# Patient Record
Sex: FEMALE | Race: WHITE | Employment: FULL TIME | URBAN - METROPOLITAN AREA
[De-identification: names, ages, dates, MRNs, and addresses within clinical notes are randomized per-mention and may not be internally consistent; named-entity substitution may affect disease eponyms.]

---

## 2018-08-29 ENCOUNTER — OFFICE VISIT (OUTPATIENT)
Dept: INTERNAL MEDICINE CLINIC | Age: 58
End: 2018-08-29

## 2018-08-29 VITALS
HEART RATE: 80 BPM | WEIGHT: 164.2 LBS | SYSTOLIC BLOOD PRESSURE: 142 MMHG | OXYGEN SATURATION: 98 % | DIASTOLIC BLOOD PRESSURE: 100 MMHG | BODY MASS INDEX: 26.39 KG/M2 | HEIGHT: 66 IN

## 2018-08-29 DIAGNOSIS — Z12.11 COLON CANCER SCREENING: ICD-10-CM

## 2018-08-29 DIAGNOSIS — Z13.1 SCREENING FOR DIABETES MELLITUS: ICD-10-CM

## 2018-08-29 DIAGNOSIS — Z11.59 NEED FOR HEPATITIS C SCREENING TEST: ICD-10-CM

## 2018-08-29 DIAGNOSIS — Z23 NEED FOR TETANUS, DIPHTHERIA, AND ACELLULAR PERTUSSIS (TDAP) VACCINE: ICD-10-CM

## 2018-08-29 DIAGNOSIS — Z12.39 BREAST CANCER SCREENING: ICD-10-CM

## 2018-08-29 DIAGNOSIS — Z12.2 ENCOUNTER FOR SCREENING FOR LUNG CANCER: ICD-10-CM

## 2018-08-29 DIAGNOSIS — I10 UNCONTROLLED HYPERTENSION: Primary | ICD-10-CM

## 2018-08-29 DIAGNOSIS — H60.8X3 CHRONIC ECZEMATOUS OTITIS EXTERNA OF BOTH EARS: ICD-10-CM

## 2018-08-29 DIAGNOSIS — Z13.6 SCREENING FOR ISCHEMIC HEART DISEASE (IHD): ICD-10-CM

## 2018-08-29 DIAGNOSIS — Z13.220 SCREENING FOR HYPERLIPIDEMIA: ICD-10-CM

## 2018-08-29 DIAGNOSIS — Z13.29 SCREENING FOR THYROID DISORDER: ICD-10-CM

## 2018-08-29 DIAGNOSIS — G89.29 CHRONIC NECK PAIN: ICD-10-CM

## 2018-08-29 DIAGNOSIS — Z71.6 ENCOUNTER FOR SMOKING CESSATION COUNSELING: ICD-10-CM

## 2018-08-29 DIAGNOSIS — R01.1 CARDIAC MURMUR: ICD-10-CM

## 2018-08-29 DIAGNOSIS — M54.2 CHRONIC NECK PAIN: ICD-10-CM

## 2018-08-29 DIAGNOSIS — Z12.4 CERVICAL CANCER SCREENING: ICD-10-CM

## 2018-08-29 DIAGNOSIS — G89.4 CHRONIC PAIN SYNDROME: ICD-10-CM

## 2018-08-29 PROCEDURE — 93000 ELECTROCARDIOGRAM COMPLETE: CPT | Performed by: NURSE PRACTITIONER

## 2018-08-29 PROCEDURE — 4004F PT TOBACCO SCREEN RCVD TLK: CPT | Performed by: NURSE PRACTITIONER

## 2018-08-29 PROCEDURE — 90715 TDAP VACCINE 7 YRS/> IM: CPT | Performed by: NURSE PRACTITIONER

## 2018-08-29 PROCEDURE — G8419 CALC BMI OUT NRM PARAM NOF/U: HCPCS | Performed by: NURSE PRACTITIONER

## 2018-08-29 PROCEDURE — G8427 DOCREV CUR MEDS BY ELIG CLIN: HCPCS | Performed by: NURSE PRACTITIONER

## 2018-08-29 PROCEDURE — 99204 OFFICE O/P NEW MOD 45 MIN: CPT | Performed by: NURSE PRACTITIONER

## 2018-08-29 PROCEDURE — 90471 IMMUNIZATION ADMIN: CPT | Performed by: NURSE PRACTITIONER

## 2018-08-29 PROCEDURE — 3017F COLORECTAL CA SCREEN DOC REV: CPT | Performed by: NURSE PRACTITIONER

## 2018-08-29 RX ORDER — TRIAMCINOLONE ACETONIDE 0.25 MG/G
CREAM TOPICAL
Qty: 15 G | Refills: 0 | Status: SHIPPED | OUTPATIENT
Start: 2018-08-29 | End: 2020-02-27 | Stop reason: CLARIF

## 2018-08-29 RX ORDER — OLMESARTAN MEDOXOMIL 20 MG/1
20 TABLET ORAL DAILY
Qty: 30 TABLET | Refills: 0 | Status: SHIPPED | OUTPATIENT
Start: 2018-08-29 | End: 2020-02-27 | Stop reason: CLARIF

## 2018-08-29 ASSESSMENT — PATIENT HEALTH QUESTIONNAIRE - PHQ9
2. FEELING DOWN, DEPRESSED OR HOPELESS: 0
SUM OF ALL RESPONSES TO PHQ QUESTIONS 1-9: 0
SUM OF ALL RESPONSES TO PHQ9 QUESTIONS 1 & 2: 0
SUM OF ALL RESPONSES TO PHQ QUESTIONS 1-9: 0
1. LITTLE INTEREST OR PLEASURE IN DOING THINGS: 0

## 2018-08-29 ASSESSMENT — ENCOUNTER SYMPTOMS
ABDOMINAL PAIN: 0
BLOOD IN STOOL: 0
CONSTIPATION: 0
DIARRHEA: 0
COUGH: 0
SHORTNESS OF BREATH: 0

## 2018-08-29 NOTE — PATIENT INSTRUCTIONS
diphtheria, and pertussis are very serious diseases. Tdap vaccine can protect us from these diseases. And Tdap vaccine given to pregnant women can protect  babies against pertussis. Tetanus (lockjaw) is rare in the [facility] today. It causes painful muscle tightening and stiffness, usually all over the body. · It can lead to tightening of muscles in the head and neck so you can't open your mouth, swallow, or sometimes even breathe. Tetanus kills about 1 out of 10 people who are infected even after receiving the best medical care. Diphtheria is also rare in the [place] today. It can cause a thick coating to form in the back of the throat. · It can lead to breathing problems, heart failure, paralysis, and death. Pertussis (whooping cough) causes severe coughing spells, which can cause difficulty breathing, vomiting, and disturbed sleep. · It can also lead to weight loss, incontinence, and rib fractures. Up to 2 in 100 adolescents and 5 in 100 adults with pertussis are hospitalized or have complications, which could include pneumonia or death. These diseases are caused by bacteria. Diphtheria and pertussis are spread from person to person through secretions from coughing or sneezing. Tetanus enters the body through cuts, scratches, or wounds. Before vaccines, as many as 200,000 cases of diphtheria, 200,000 cases of pertussis, and hundreds of cases of tetanus were reported in the [place] each year. Since vaccination began, reports of cases for tetanus and diphtheria have dropped by about 99% and for pertussis by about 80%. Tdap vaccine  The Tdap vaccine can protect adolescents and adults from tetanus, diphtheria, and pertussis. One dose of Tdap is routinely given at age 6 or 15. People who did not get Tdap at that age should get it as soon as possible. Tdap is especially important for health care professionals and anyone having close contact with a baby younger than 12 months.   Pregnant women should get a dose of Tdap during every pregnancy, to protect the  from pertussis. Infants are most at risk for severe, life-threatening complications from pertussis. Another vaccine, called Td, protects against tetanus and diphtheria, but not pertussis. A Td booster should be given every 10 years. Tdap may be given as one of these boosters if you have never gotten Tdap before. Tdap may also be given after a severe cut or burn to prevent tetanus infection. Your doctor or the person giving you the vaccine can give you more information. Tdap may safely be given at the same time as other vaccines. Some people should not get this vaccine  · A person who has ever had a life-threatening allergic reaction after a previous dose of any diphtheria-, tetanus-, or pertussis-containing vaccine, OR has a severe allergy to any part of this vaccine, should not get Tdap vaccine. Tell the person giving the vaccine about any severe allergies. · Anyone who had coma or long repeated seizures within 7 days after a childhood dose of DTP or DTaP, or a previous dose of Tdap, should not get Tdap, unless a cause other than the vaccine was found. They can still get Td. · Talk to your doctor if you:  ¨ Have seizures or another nervous system problem. ¨ Had severe pain or swelling after any vaccine containing diphtheria, tetanus, or pertussis. ¨ Ever had a condition called Guillain-Barré Syndrome (GBS). ¨ Aren't feeling well on the day the shot is scheduled. Risks  With any medicine, including vaccines, there is a chance of side effects. These are usually mild and go away on their own. Serious reactions are also possible but are rare. Most people who get Tdap vaccine do not have any problems with it.   Mild problems following Tdap  (Did not interfere with activities)  · Pain where the shot was given (about 3 in 4 adolescents or 2 in 3 adults)  · Redness or swelling where the shot was given (about 1 person in 5)  · Mild Eating healthy foods and being active every day, as well as staying at a healthy weight, may help prevent cancer. Where can you learn more? Go to https://chpepiceweb.[email or web address] org and sign in to your Woopie account. Enter L647 in the [employer] box to learn more about \"Learning About Breast Cancer Screening. \"     If you do not have an account, please click on the \"Sign Up Now\" link. Current as of: May 12, 2017  Content Version: 11.7  © 4287-7567 [employer]. Care instructions adapted under license by [facility] ([facility]). If you have questions about a medical condition or this instruction, always ask your healthcare professional. [address] any warranty or liability for your use of this information. Patient Education        Colon Cancer Screening: Care Instructions  Your Care Instructions    Colorectal cancer occurs in the colon or rectum. That's the lower part of your digestive system. It is the second-leading cause of cancer deaths in the [place]. It often starts with small growths called polyps in the colon or rectum. Polyps are usually found with screening tests. Depending on the type of test, any polyps found may be removed during the tests. Colorectal cancer usually does not cause symptoms at first. But regular tests can help find it early, before it spreads and becomes harder to treat. Experts advise routine tests for colon cancer for people starting at age 48. And they advise people with a higher risk of colon cancer to get tested sooner. Talk with your doctor about when you should start testing. Discuss which tests you need. Follow-up care is a key part of your treatment and safety. Be sure to make and go to all appointments, and call your doctor if you are having problems. It's also a good idea to know your test results and keep a list of the medicines you take. What are the main screening tests for colon cancer? · Stool tests.  These polyposis (FAP). · Have had radiation treatments to the belly or pelvis. When should you call for help? Watch closely for changes in your health, and be sure to contact your doctor if:    · You have any changes in your bowel habits.     · You have any problems. Where can you learn more? Go to https://chpepiceweb.[email or web address] org and sign in to your BAASBOX account. Enter [phone] in the [employer] box to learn more about \"Colon Cancer Screening: Care Instructions. \"     If you do not have an account, please click on the \"Sign Up Now\" link. Current as of: May 12, 2017  Content Version: 11.7  © 0956-0038 [employer]. Care instructions adapted under license by [facility] ([facility]). If you have questions about a medical condition or this instruction, always ask your healthcare professional. [address] any warranty or liability for your use of this information. Patient Education        Home Blood Pressure Test: About This Test  What is it? A home blood pressure test allows you to keep track of your blood pressure at home. Blood pressure is a measure of the force of blood against the walls of your arteries. Blood pressure readings include two numbers, such as 130/80 (say \"130 over 80\"). The first number is the systolic pressure. The second number is the diastolic pressure. Why is this test done? You may do this test at home to:  · Find out if you have high blood pressure. · Track your blood pressure if you have high blood pressure. · Track how well medicine is working to reduce high blood pressure. · Check how lifestyle changes, such as weight loss and exercise, are affecting blood pressure. How can you prepare for the test?  · Do not use caffeine, tobacco, or medicines known to raise blood pressure (such as nasal decongestant sprays) for at least 30 minutes before taking your blood pressure.   · Do not exercise for at least 30 minutes before taking your blood quitting in terms of one day at a time . Tell yourself you won't smoke today, and then don't. Clean your clothes to rid them of the cigarette smell, which can linger a long time. On the Day You Quit   Throw away all your cigarettes and matches. Hide your lighters and ashtrays. Visit the dentist and have your teeth cleaned to get rid of tobacco stains. Notice how nice they look and resolve to keep them that way. Make a list of things you'd like to buy for yourself or someone else. Estimate the cost in terms of packs of cigarettes, and put the money aside to buy these presents. Keep very busy on the big day. Go to the movies, exercise, take long walks, or go bike riding. Remind your family and friends that this is your quit date, and ask them to help you over the rough spots of the first couple of days and weeks. Buy yourself a treat or do something special to celebrate. Telephone and Internet Support   Telephone, web-, and computer-based programs can offer you the support that you need to quit and to stay smoke-free. You can find many programs online, like the American Lung Association's [place] from Smoking . Immediately After Quitting   Develop a clean, fresh, nonsmoking environment around yourselfat work and at home. Buy yourself flowersyou may be surprised how much you can enjoy their scent now. The first few days after you quit, spend as much free time as possible in places where smoking isn't allowed, such as [address] [place] [address], museums, theaters, department stores, and churches. Drink large quantities of water and fruit juice (but avoid sodas that contain caffeine). Try to avoid alcohol, coffee, and other beverages that you associate with cigarette smoking. Strike up conversation instead of a match for a cigarette. If you miss the sensation of having a cigarette in your hand, play with something elsea pencil, a paper clip, a marble.    If you miss having something in your mouth, try

## 2018-08-29 NOTE — PROGRESS NOTES
cancer screening  Baseline is needed    - KALIN DIGITAL SCREEN W OR WO CAD BILATERAL; Future    8. Colon cancer screening  Baseline screening    - COLOGUARD; Future  - [facility] and [address]    9. Need for tetanus, diphtheria, and acellular pertussis (Tdap) vaccine  Given today    - Tdap (age 10y-63y) IM (ADACEL)    8. Need for hepatitis C screening test  Screening requested    11. Cervical cancer screening  Recommend you follow up with me or with GYN for  This soon. 12. Chronic eczematous otitis externa of both ears    - triamcinolone (KENALOG) 0.025 % cream; Apply Topically to external ears and outer ear canals once daily. Dispense: 15 g; Refill: 0    13. Encounter for screening for lung cancer  - CT LUNG SCREENING (ANNUAL); Future    14. Encounter for smoking cessation counseling  Pt advised to try Nicoderm Patch, supplemented with gum and/or lozenges, if needed. Consider Chantix or Wellbutrin.    - CT LUNG SCREENING (ANNUAL);  Future

## 2018-09-20 ENCOUNTER — CLINICAL DOCUMENTATION (OUTPATIENT)
Dept: INTERNAL MEDICINE CLINIC | Age: 58
End: 2018-09-20

## 2018-09-26 ENCOUNTER — TELEPHONE (OUTPATIENT)
Dept: INTERNAL MEDICINE CLINIC | Age: 58
End: 2018-09-26

## 2019-12-17 ENCOUNTER — HOSPITAL ENCOUNTER (OUTPATIENT)
Dept: GENERAL RADIOLOGY | Age: 59
Discharge: HOME | End: 2019-12-17
Payer: COMMERCIAL

## 2019-12-17 ENCOUNTER — HOSPITAL ENCOUNTER (OUTPATIENT)
Age: 59
Discharge: HOME | End: 2019-12-17
Payer: COMMERCIAL

## 2019-12-17 DIAGNOSIS — R52 PAIN: ICD-10-CM

## 2019-12-17 PROCEDURE — 72110 X-RAY EXAM L-2 SPINE 4/>VWS: CPT

## 2020-02-18 ENCOUNTER — HOSPITAL ENCOUNTER (OUTPATIENT)
Dept: NON INVASIVE DIAGNOSTICS | Age: 60
Discharge: HOME | End: 2020-02-18
Payer: COMMERCIAL

## 2020-02-18 LAB
LV EF: 58 %
LVEF MODALITY: NORMAL

## 2020-02-18 PROCEDURE — 93306 TTE W/DOPPLER COMPLETE: CPT

## 2020-02-27 ENCOUNTER — APPOINTMENT (OUTPATIENT)
Dept: GENERAL RADIOLOGY | Age: 60
End: 2020-02-27
Payer: COMMERCIAL

## 2020-02-27 ENCOUNTER — HOSPITAL ENCOUNTER (OUTPATIENT)
Age: 60
Setting detail: OBSERVATION
Discharge: HOME | End: 2020-02-28
Attending: EMERGENCY MEDICINE | Admitting: EMERGENCY MEDICINE
Payer: COMMERCIAL

## 2020-02-27 PROBLEM — R07.9 CHEST PAIN: Status: ACTIVE | Noted: 2020-02-27

## 2020-02-27 LAB
ANION GAP SERPL CALCULATED.3IONS-SCNC: 12 MMOL/L (ref 3–16)
BASOPHILS ABSOLUTE: 0.1 K/UL (ref 0–0.2)
BASOPHILS RELATIVE PERCENT: 1.4 %
BUN BLDV-MCNC: 21 MG/DL (ref 7–20)
CALCIUM SERPL-MCNC: 9.3 MG/DL (ref 8.3–10.6)
CHLORIDE BLD-SCNC: 100 MMOL/L (ref 99–110)
CO2: 26 MMOL/L (ref 21–32)
CREAT SERPL-MCNC: 0.6 MG/DL (ref 0.6–1.2)
EKG ATRIAL RATE: 78 BPM
EKG DIAGNOSIS: NORMAL
EKG P AXIS: 30 DEGREES
EKG P-R INTERVAL: 112 MS
EKG Q-T INTERVAL: 390 MS
EKG QRS DURATION: 86 MS
EKG QTC CALCULATION (BAZETT): 444 MS
EKG R AXIS: 5 DEGREES
EKG T AXIS: 62 DEGREES
EKG VENTRICULAR RATE: 78 BPM
EOSINOPHILS ABSOLUTE: 0.3 K/UL (ref 0–0.6)
EOSINOPHILS RELATIVE PERCENT: 3.5 %
GFR AFRICAN AMERICAN: >60
GFR NON-AFRICAN AMERICAN: >60
GLUCOSE BLD-MCNC: 115 MG/DL (ref 70–99)
HCT VFR BLD CALC: 43.2 % (ref 36–48)
HEMOGLOBIN: 14.3 G/DL (ref 12–16)
LYMPHOCYTES ABSOLUTE: 2.9 K/UL (ref 1–5.1)
LYMPHOCYTES RELATIVE PERCENT: 36.8 %
MCH RBC QN AUTO: 28.2 PG (ref 26–34)
MCHC RBC AUTO-ENTMCNC: 33.1 G/DL (ref 31–36)
MCV RBC AUTO: 85.2 FL (ref 80–100)
MONOCYTES ABSOLUTE: 0.6 K/UL (ref 0–1.3)
MONOCYTES RELATIVE PERCENT: 8.1 %
NEUTROPHILS ABSOLUTE: 4 K/UL (ref 1.7–7.7)
NEUTROPHILS RELATIVE PERCENT: 50.2 %
PDW BLD-RTO: 13.9 % (ref 12.4–15.4)
PLATELET # BLD: 283 K/UL (ref 135–450)
PMV BLD AUTO: 8.2 FL (ref 5–10.5)
POTASSIUM REFLEX MAGNESIUM: 4.8 MMOL/L (ref 3.5–5.1)
RBC # BLD: 5.07 M/UL (ref 4–5.2)
SODIUM BLD-SCNC: 138 MMOL/L (ref 136–145)
TROPONIN: <0.01 NG/ML
WBC # BLD: 7.9 K/UL (ref 4–11)

## 2020-02-27 PROCEDURE — 80048 BASIC METABOLIC PNL TOTAL CA: CPT

## 2020-02-27 PROCEDURE — 85025 COMPLETE CBC W/AUTO DIFF WBC: CPT

## 2020-02-27 PROCEDURE — 2580000003 HC RX 258: Performed by: INTERNAL MEDICINE

## 2020-02-27 PROCEDURE — G0378 HOSPITAL OBSERVATION PER HR: HCPCS

## 2020-02-27 PROCEDURE — 6360000002 HC RX W HCPCS: Performed by: INTERNAL MEDICINE

## 2020-02-27 PROCEDURE — 84484 ASSAY OF TROPONIN QUANT: CPT

## 2020-02-27 PROCEDURE — 6370000000 HC RX 637 (ALT 250 FOR IP): Performed by: EMERGENCY MEDICINE

## 2020-02-27 PROCEDURE — 93005 ELECTROCARDIOGRAM TRACING: CPT | Performed by: EMERGENCY MEDICINE

## 2020-02-27 PROCEDURE — 36415 COLL VENOUS BLD VENIPUNCTURE: CPT

## 2020-02-27 PROCEDURE — 71046 X-RAY EXAM CHEST 2 VIEWS: CPT

## 2020-02-27 PROCEDURE — 99285 EMERGENCY DEPT VISIT HI MDM: CPT

## 2020-02-27 PROCEDURE — 96372 THER/PROPH/DIAG INJ SC/IM: CPT

## 2020-02-27 RX ORDER — AMLODIPINE BESYLATE 5 MG/1
5 TABLET ORAL DAILY
Status: DISCONTINUED | OUTPATIENT
Start: 2020-02-28 | End: 2020-02-28 | Stop reason: HOSPADM

## 2020-02-27 RX ORDER — SODIUM CHLORIDE 0.9 % (FLUSH) 0.9 %
10 SYRINGE (ML) INJECTION PRN
Status: DISCONTINUED | OUTPATIENT
Start: 2020-02-27 | End: 2020-02-28 | Stop reason: HOSPADM

## 2020-02-27 RX ORDER — TRAMADOL HYDROCHLORIDE 50 MG/1
50 TABLET ORAL 2 TIMES DAILY PRN
Status: ON HOLD | COMMUNITY
End: 2020-09-26 | Stop reason: HOSPADM

## 2020-02-27 RX ORDER — ACETAMINOPHEN 325 MG/1
650 TABLET ORAL EVERY 6 HOURS PRN
Status: DISCONTINUED | OUTPATIENT
Start: 2020-02-27 | End: 2020-02-28 | Stop reason: HOSPADM

## 2020-02-27 RX ORDER — NAPROXEN SODIUM 220 MG
220 TABLET ORAL 2 TIMES DAILY PRN
Status: ON HOLD | COMMUNITY
End: 2020-02-28 | Stop reason: HOSPADM

## 2020-02-27 RX ORDER — ACETAMINOPHEN 650 MG/1
650 SUPPOSITORY RECTAL EVERY 6 HOURS PRN
Status: DISCONTINUED | OUTPATIENT
Start: 2020-02-27 | End: 2020-02-28 | Stop reason: HOSPADM

## 2020-02-27 RX ORDER — AMLODIPINE BESYLATE 5 MG/1
5 TABLET ORAL DAILY
Status: ON HOLD | COMMUNITY
Start: 2020-02-25 | End: 2020-09-26 | Stop reason: HOSPADM

## 2020-02-27 RX ORDER — TRAMADOL HYDROCHLORIDE 50 MG/1
50 TABLET ORAL 2 TIMES DAILY
Status: DISCONTINUED | OUTPATIENT
Start: 2020-02-27 | End: 2020-02-28 | Stop reason: HOSPADM

## 2020-02-27 RX ORDER — ASPIRIN 325 MG
325 TABLET ORAL ONCE
Status: COMPLETED | OUTPATIENT
Start: 2020-02-27 | End: 2020-02-27

## 2020-02-27 RX ORDER — SODIUM CHLORIDE 0.9 % (FLUSH) 0.9 %
10 SYRINGE (ML) INJECTION EVERY 12 HOURS SCHEDULED
Status: DISCONTINUED | OUTPATIENT
Start: 2020-02-27 | End: 2020-02-28 | Stop reason: HOSPADM

## 2020-02-27 RX ORDER — M-VIT,TX,IRON,MINS/CALC/FOLIC 27MG-0.4MG
1 TABLET ORAL DAILY
Status: ON HOLD | COMMUNITY
End: 2020-09-26 | Stop reason: HOSPADM

## 2020-02-27 RX ORDER — DOXYCYCLINE HYCLATE 100 MG/1
100 CAPSULE ORAL 2 TIMES DAILY
Status: ON HOLD | COMMUNITY
Start: 2020-02-25 | End: 2020-02-28 | Stop reason: HOSPADM

## 2020-02-27 RX ORDER — POLYETHYLENE GLYCOL 3350 17 G/17G
17 POWDER, FOR SOLUTION ORAL DAILY PRN
Status: DISCONTINUED | OUTPATIENT
Start: 2020-02-27 | End: 2020-02-28 | Stop reason: HOSPADM

## 2020-02-27 RX ORDER — OXYMETAZOLINE HYDROCHLORIDE 0.05 G/100ML
2 SPRAY NASAL 2 TIMES DAILY PRN
Status: ON HOLD | COMMUNITY
End: 2020-09-26 | Stop reason: HOSPADM

## 2020-02-27 RX ORDER — ONDANSETRON 2 MG/ML
4 INJECTION INTRAMUSCULAR; INTRAVENOUS EVERY 6 HOURS PRN
Status: DISCONTINUED | OUTPATIENT
Start: 2020-02-27 | End: 2020-02-28 | Stop reason: HOSPADM

## 2020-02-27 RX ORDER — ASPIRIN 81 MG/1
81 TABLET, CHEWABLE ORAL DAILY
Status: DISCONTINUED | OUTPATIENT
Start: 2020-02-28 | End: 2020-02-28 | Stop reason: HOSPADM

## 2020-02-27 RX ADMIN — ENOXAPARIN SODIUM 40 MG: 40 INJECTION SUBCUTANEOUS at 11:59

## 2020-02-27 RX ADMIN — Medication 10 ML: at 21:04

## 2020-02-27 RX ADMIN — ASPIRIN 325 MG ORAL TABLET 325 MG: 325 PILL ORAL at 08:50

## 2020-02-27 RX ADMIN — Medication 10 ML: at 11:58

## 2020-02-27 ASSESSMENT — PAIN SCALES - GENERAL: PAINLEVEL_OUTOF10: 0

## 2020-02-27 ASSESSMENT — HEART SCORE
ECG: 0
ECG: 0

## 2020-02-27 NOTE — ED PROVIDER NOTES
[address] ENCOUNTER          ATTENDING PHYSICIAN NOTE       Date of evaluation: 2/27/2020    Chief Complaint     Chest Pain (Pt presents to ED with \"on and off chest pain\" that started 0430 is am. Pt states pain is above left breast that radiates to left shoulder. 8/10 sharp in nature, pt denies SOB with pain, pt + nausea. Pt currently denies pain a present time. ) and Nausea      History of Present Illness     Kathryn Campos is a 60 y.o. female who presents with chest pain. Woke up from sleep with left-sided chest pain at about 430 this morning. Describes it as sharp, radiating to left shoulder, lasted a minute or 2, associated with mild diaphoresis and nausea, not pleuritic. No aggravating or alleviating factors. States that she has had 5 or 6 episodes of this pain since then. Never exertional.  Has never had anything like this before. Does have high blood pressure. Review of Systems     Positive for chest pain. Negative for vomiting, shortness of breath, abdominal pain. All other systems were reviewed and are negative, except as mentioned in HPI. Past Medical, Surgical, Family, and Social History     She has a past medical history of Arthritis and Hypertension. She has a past surgical history that includes knee surgery (2009) and Carpal tunnel release (2011). Her family history includes Heart Attack in her father; Heart Disease in her father and mother; Kidney Disease in her mother. She reports that she has been smoking. She has a 10.00 pack-year smoking history. She has never used smokeless tobacco. She reports that she does not drink alcohol or use drugs.     Medications     Previous Medications    AMLODIPINE (NORVASC) 5 MG TABLET        DOXYCYCLINE HYCLATE (VIBRAMYCIN) 100 MG CAPSULE        MULTIPLE VITAMINS-MINERALS (MULTIVITAL PO)    Take by mouth    NAPROXEN SODIUM (ALEVE PO)    Take by mouth    OLMESARTAN (BENICAR) 20 MG TABLET    Take 1 tablet by mouth daily    TRAMADOL (ULTRAM) 50 MG TABLET    Take 50 mg by mouth 2 times daily    TRIAMCINOLONE (KENALOG) 0.025 % CREAM    Apply Topically to external ears and outer ear canals once daily. Allergies     She is allergic to celebrex [celecoxib]; lisinopril; and macrodantin [nitrofurantoin macrocrystal]. Physical Exam     INITIAL VITALS: BP: (!) 181/94, Temp: 98.6 °F (37 °C), Pulse: 72, Resp: 12, SpO2: 100 %       General:  Well appearing. No acute distress. Eyes:  Pupils reactive. No discharge from eyes. ENT:  No discharge from nose. Neck:  Supple. Pulmonary:   Non-labored breathing. Breath sounds clear bilaterally. Cardiac:  Regular rate and rhythm. Abdomen:  Soft. Non-tender. Non-distended. Skin:  No rash. Neuro:  Alert and oriented x4. Moves all four extremities to command. No focal deficit. Extremities:  Warm and perfused. No peripheral edema. Diagnostic Results     LABS:   Please see electronic medical record for laboratory tests performed in the ED. RADIOLOGY:  Please see electronic medical record for imaging studies performed in the ED. EKG   Please see medical record for specific interval measurements. Impression: Normal sinus rhythm at 78 bpm, normal intervals, normal axis, no acute ST or T wave changes compared to EKG from August 29, 2018        RECENT VITALS:  BP: (!) 181/94, Temp: 98.6 °F (37 °C), Pulse: 72, Resp: 12     Procedures         ED Course     Nursing Notes, Past Medical Hx, Past Surgical Hx, Social Hx, Allergies, and Family Hx were reviewed. The patient was given the following medications:  Orders Placed This Encounter   Medications    aspirin tablet 325 mg       CONSULTS:  Lizbet Darden / EDGARD / Kely Rayna is a 60 y.o. female with intermittent chest pain. Currently chest pain-free. She was given an aspirin. EKG unremarkable and unchanged from previous.   Chest x-ray unremarkable. CBC with white count of 7.9 and hemoglobin 14.3. Renal panel with normal electrolytes and renal function. Troponin negative. The patient has a heart score 4. Ideally we would get a stress test on her. She has had 2 cups of coffee today and therefore cannot be stressed except for standard treadmill test.  The patient has significant arthritis in her extremities and therefore would likely fail treadmill test and it would be better to get advanced imaging along with her stress test.  The patient does not currently have a primary care physician. Therefore I will admit her for further management. Clinical Impression     1. Chest pain, unspecified type        Disposition     PATIENT REFERRED TO:  No follow-up provider specified.     DISCHARGE MEDICATIONS:  New Prescriptions    No medications on file       DISPOSITION Decision To Admit 02/27/2020 09:39:03 AM           Jose Armando Emanuel MD  02/27/20 0021

## 2020-02-27 NOTE — H&P
(ALEVE) 220 MG tablet Take 220 mg by mouth 2 times daily as needed (arthritis pain)   Yes Historical Provider, MD   oxymetazoline (AFRIN) 0.05 % nasal spray 2 sprays by Nasal route 2 times daily as needed for Congestion   Yes Historical Provider, MD       Allergies:  Celebrex [celecoxib]; Lisinopril; and Macrodantin [nitrofurantoin macrocrystal]    Social History:      The patient currentlylives at home     TOBACCO:   reports that she has been smoking. She has a 10.00 pack-year smoking history. She has never used smokeless tobacco.  ETOH:   reports no history of alcohol use. Family History:       Reviewed in detail and negative for DM, CAD,Cancer, CVA. Positive as follows:        Problem Relation Age of Onset    Heart Disease Mother     Kidney Disease Mother     Heart Disease Father     Heart Attack Father        REVIEW OF SYSTEMS:   Pertinent positives as noted in the HPI. All other systems reviewed and negative. PHYSICAL EXAM PERFORMED:    BP (!) 153/85   Pulse 74   Temp 98.6 °F (37 °C) (Oral)   Resp 12   Ht 5' 2\" (1.575 m)   Wt 162 lb (73.5 kg)   SpO2 100%   BMI 29.63 kg/m²     Physical Exam  Constitutional:       Appearance: Normal appearance. HENT:      Head: Normocephalic and atraumatic. Mouth/Throat:      Mouth: Mucous membranes are moist.      Pharynx: Oropharynx is clear. Neck:      Musculoskeletal: Normal range of motion and neck supple. Cardiovascular:      Rate and Rhythm: Normal rate and regular rhythm. Pulses: Normal pulses. Heart sounds: Normal heart sounds. Pulmonary:      Effort: Pulmonary effort is normal.      Breath sounds: Normal breath sounds. Abdominal:      General: Abdomen is flat. Palpations: Abdomen is soft. Musculoskeletal: Normal range of motion. General: No swelling or tenderness. Skin:     General: Skin is warm and dry. Capillary Refill: Capillary refill takes less than 2 seconds.    Neurological:      General: No focal deficit present. Mental Status: She is alert and oriented to person, place, and time. Psychiatric:         Mood and Affect: Mood normal.         Behavior: Behavior normal.         Labs:     Recent Labs     02/27/20  0845   WBC 7.9   HGB 14.3   HCT 43.2        Recent Labs     02/27/20  0845      K 4.8      CO2 26   BUN 21*   CREATININE 0.6   CALCIUM 9.3     No results for input(s): AST, ALT, BILIDIR, BILITOT, ALKPHOS in the last 72 hours. No results for input(s): INR in the last 72 hours. Recent Labs     02/27/20  0845 02/27/20  1144   TROPONINI <0.01 <0.01       Urinalysis:   Lab Results   Component Value Date    NITRU Negative 08/04/2015    WBCUA 3-5 08/04/2015    BACTERIA Rare 08/04/2015    RBCUA 3-5 08/04/2015    BLOODU SMALL 08/04/2015    SPECGRAV 1.010 08/04/2015    GLUCOSEU Negative 08/04/2015       Radiology:         XR CHEST STANDARD (2 VW)   Final Result      Clear lungs. Normal cardiomediastinal silhouette. NM Cardiac Stress Test Nuclear Imaging    (Results Pending)       ASSESSMENT & PLAN  Active Problems:    Chest pain  Resolved Problems:    * No resolved hospital problems. *    Chest pain  Typical with the left shoulder radiation and diaphoresis. Risk factors include hypertension, smoker, positive family history. We will trend troponin. Nuclear stress test tomorrow. Hypertension  Continue home medications and monitor. Check lipid function panel      Code Status: Full Toro Guaman MD    Thank you No primary care provider on file. for the opportunity to be involved in this patient's care. If youhave any questions or concerns please feel free to contact me at [phone].

## 2020-02-27 NOTE — ED TRIAGE NOTES
Pt presents to ED with \"on and off chest pain\" that started 0430 is am. Pt states pain is above left breast that radiates to left shoulder. 8/10 sharp in nature, pt denies SOB with pain, pt + nausea. Pt currently denies pain a present time.

## 2020-02-27 NOTE — ED NOTES
Home Med List is complete. Source of medications in list is patient interview, as well as confirming with Kelsi Torres fills. Patient states that she did have her morning meds today.       Dana Menjivar PharmD., BCPS   2/27/2020 11:19 AM  Wireless: [phone]

## 2020-02-27 NOTE — ED NOTES
.Rounded on patient for pain, repositioning, and toileting needs. Call light and personal items within reach. Side rails up times 2.      Caroline Ruiz, BIN  02/27/20 [id number]

## 2020-02-28 VITALS
SYSTOLIC BLOOD PRESSURE: 179 MMHG | WEIGHT: 159.83 LBS | BODY MASS INDEX: 29.41 KG/M2 | RESPIRATION RATE: 14 BRPM | DIASTOLIC BLOOD PRESSURE: 91 MMHG | HEIGHT: 62 IN | OXYGEN SATURATION: 99 % | HEART RATE: 74 BPM | TEMPERATURE: 98.9 F

## 2020-02-28 LAB
HCT VFR BLD CALC: 45 % (ref 36–48)
HEMOGLOBIN: 14.5 G/DL (ref 12–16)
LV EF: 73 %
LVEF MODALITY: NORMAL
MCH RBC QN AUTO: 27.8 PG (ref 26–34)
MCHC RBC AUTO-ENTMCNC: 32.3 G/DL (ref 31–36)
MCV RBC AUTO: 86 FL (ref 80–100)
PDW BLD-RTO: 14 % (ref 12.4–15.4)
PLATELET # BLD: 311 K/UL (ref 135–450)
PMV BLD AUTO: 8.1 FL (ref 5–10.5)
RBC # BLD: 5.23 M/UL (ref 4–5.2)
WBC # BLD: 6.1 K/UL (ref 4–11)

## 2020-02-28 PROCEDURE — A9502 TC99M TETROFOSMIN: HCPCS | Performed by: INTERNAL MEDICINE

## 2020-02-28 PROCEDURE — 3430000000 HC RX DIAGNOSTIC RADIOPHARMACEUTICAL: Performed by: INTERNAL MEDICINE

## 2020-02-28 PROCEDURE — 6360000002 HC RX W HCPCS: Performed by: INTERNAL MEDICINE

## 2020-02-28 PROCEDURE — G0378 HOSPITAL OBSERVATION PER HR: HCPCS

## 2020-02-28 PROCEDURE — 2580000003 HC RX 258: Performed by: INTERNAL MEDICINE

## 2020-02-28 PROCEDURE — 85027 COMPLETE CBC AUTOMATED: CPT

## 2020-02-28 PROCEDURE — 78452 HT MUSCLE IMAGE SPECT MULT: CPT

## 2020-02-28 PROCEDURE — 6370000000 HC RX 637 (ALT 250 FOR IP): Performed by: INTERNAL MEDICINE

## 2020-02-28 PROCEDURE — 93017 CV STRESS TEST TRACING ONLY: CPT

## 2020-02-28 PROCEDURE — 96372 THER/PROPH/DIAG INJ SC/IM: CPT

## 2020-02-28 PROCEDURE — 36415 COLL VENOUS BLD VENIPUNCTURE: CPT

## 2020-02-28 RX ORDER — SODIUM CHLORIDE 0.9 % (FLUSH) 0.9 %
10 SYRINGE (ML) INJECTION PRN
Status: COMPLETED | OUTPATIENT
Start: 2020-02-28 | End: 2020-02-28

## 2020-02-28 RX ORDER — LOSARTAN POTASSIUM 25 MG/1
25 TABLET ORAL DAILY
Qty: 30 TABLET | Refills: 0 | Status: SHIPPED | OUTPATIENT
Start: 2020-02-28 | End: 2020-09-16

## 2020-02-28 RX ADMIN — TETROFOSMIN 11 MILLICURIE: 1.38 INJECTION, POWDER, LYOPHILIZED, FOR SOLUTION INTRAVENOUS at 08:03

## 2020-02-28 RX ADMIN — AMLODIPINE BESYLATE 5 MG: 5 TABLET ORAL at 11:03

## 2020-02-28 RX ADMIN — ASPIRIN 81 MG 81 MG: 81 TABLET ORAL at 11:03

## 2020-02-28 RX ADMIN — ENOXAPARIN SODIUM 40 MG: 40 INJECTION SUBCUTANEOUS at 11:04

## 2020-02-28 RX ADMIN — REGADENOSON 0.4 MG: 0.08 INJECTION, SOLUTION INTRAVENOUS at 10:42

## 2020-02-28 RX ADMIN — Medication 10 ML: at 08:03

## 2020-02-28 RX ADMIN — Medication 10 ML: at 11:04

## 2020-02-28 RX ADMIN — Medication 10 ML: at 09:50

## 2020-02-28 RX ADMIN — TETROFOSMIN 34.4 MILLICURIE: 1.38 INJECTION, POWDER, LYOPHILIZED, FOR SOLUTION INTRAVENOUS at 09:50

## 2020-02-28 ASSESSMENT — PAIN SCALES - GENERAL
PAINLEVEL_OUTOF10: 0

## 2020-02-28 NOTE — PROGRESS NOTES
Patient discharging home with . IV and telemetry removed. Discharge instructions and medications reviewed, patient verbalizes understanding and states she has no further questions at this time. Discharging with personal belongings and AVS packet. Declined wheelchair to leave the unit.

## 2020-02-28 NOTE — CARE COORDINATION
worker;  or  CANNOT provide pharmacy voucher for patients co-pays  5. Patients can then  the prescription on their way out of the hospital at discharge, or pharmacy can deliver to the bedside if staff is available. (payment due at time of pick-up or delivery - cash, check, or card accepted)     Able to afford home medications/ co-pay costs: Yes    ADLS  Support Systems: Spouse/Significant Other    PT AM-PAC:   /24  OT AM-PAC:   /24    [place]: home w/partner  Steps:      Plans to RETURN to current housing: No  Barriers to RETURNING to current housing: none    Donal [address] 77  Currently ACTIVE with [facility] [address]: No  Home Care Agency: Not Applicable    Currently ACTIVE with [place] on Aging: No  Passport/ Waiver: No  Passport/ Waiver Services: Not Applicable        Durable Medical Equipment  DME Provider: rusty  Equipment: na    Home Oxygen and [address] [place] prior to admission: No  [address]: Not Applicable  Other Respiratory Equipment: na      Dialysis  Active with HD/PD prior to admission: No  Nephrologist:      HD Center:  Not Applicable    DISCHARGE PLAN:  Disposition: Home- No Services Needed    Transportation PLAN for discharge: partner     Factors facilitating achievement of predicted outcomes: Caregiver support    Barriers to discharge: na    Additional Case Management Notes: CM met with patient. Waiting for stress test results. Does not anticipate any needs. The Plan for Transition of Care is related to the following treatment goals of Chest pain [R07.9]  Chest pain [R07.9]    The Patient and/or patient representative Nilam and her family were provided with a choice of provider and agrees with the discharge plan Not Indicated    Freedom of choice list was provided with basic dialogue that supports the patient's individualized plan of care/goals and shares the quality data associated with the providers.  Not

## 2020-09-16 ENCOUNTER — APPOINTMENT (OUTPATIENT)
Dept: GENERAL RADIOLOGY | Age: 60
DRG: 234 | End: 2020-09-16
Payer: COMMERCIAL

## 2020-09-16 ENCOUNTER — HOSPITAL ENCOUNTER (INPATIENT)
Age: 60
LOS: 10 days | Discharge: HOME HEALTH | DRG: 234 | End: 2020-09-26
Attending: EMERGENCY MEDICINE | Admitting: INTERNAL MEDICINE
Payer: COMMERCIAL

## 2020-09-16 PROBLEM — I21.4 NSTEMI (NON-ST ELEVATED MYOCARDIAL INFARCTION) (HCC): Status: ACTIVE | Noted: 2020-09-16

## 2020-09-16 LAB
ANION GAP SERPL CALCULATED.3IONS-SCNC: 11 MMOL/L (ref 3–16)
ANION GAP SERPL CALCULATED.3IONS-SCNC: 8 MMOL/L (ref 3–16)
ANTI-XA UNFRAC HEPARIN: 0.29 IU/ML (ref 0.3–0.7)
ANTI-XA UNFRAC HEPARIN: 0.44 IU/ML (ref 0.3–0.7)
ANTI-XA UNFRAC HEPARIN: 0.48 IU/ML (ref 0.3–0.7)
APTT: 28.1 SEC (ref 24.2–36.2)
BASOPHILS ABSOLUTE: 0.1 K/UL (ref 0–0.2)
BASOPHILS RELATIVE PERCENT: 1.3 %
BUN BLDV-MCNC: 20 MG/DL (ref 7–20)
BUN BLDV-MCNC: 21 MG/DL (ref 7–20)
CALCIUM SERPL-MCNC: 9.5 MG/DL (ref 8.3–10.6)
CALCIUM SERPL-MCNC: 9.7 MG/DL (ref 8.3–10.6)
CHLORIDE BLD-SCNC: 100 MMOL/L (ref 99–110)
CHLORIDE BLD-SCNC: 97 MMOL/L (ref 99–110)
CO2: 25 MMOL/L (ref 21–32)
CO2: 28 MMOL/L (ref 21–32)
CREAT SERPL-MCNC: 0.6 MG/DL (ref 0.6–1.2)
CREAT SERPL-MCNC: 0.8 MG/DL (ref 0.6–1.2)
EKG ATRIAL RATE: 70 BPM
EKG ATRIAL RATE: 72 BPM
EKG ATRIAL RATE: 75 BPM
EKG DIAGNOSIS: NORMAL
EKG P AXIS: 17 DEGREES
EKG P AXIS: 22 DEGREES
EKG P AXIS: 72 DEGREES
EKG P-R INTERVAL: 122 MS
EKG P-R INTERVAL: 122 MS
EKG P-R INTERVAL: 150 MS
EKG Q-T INTERVAL: 408 MS
EKG Q-T INTERVAL: 420 MS
EKG Q-T INTERVAL: 420 MS
EKG QRS DURATION: 76 MS
EKG QRS DURATION: 78 MS
EKG QRS DURATION: 80 MS
EKG QTC CALCULATION (BAZETT): 453 MS
EKG QTC CALCULATION (BAZETT): 455 MS
EKG QTC CALCULATION (BAZETT): 459 MS
EKG R AXIS: -11 DEGREES
EKG R AXIS: -14 DEGREES
EKG R AXIS: -4 DEGREES
EKG T AXIS: 27 DEGREES
EKG T AXIS: 41 DEGREES
EKG T AXIS: 57 DEGREES
EKG VENTRICULAR RATE: 70 BPM
EKG VENTRICULAR RATE: 72 BPM
EKG VENTRICULAR RATE: 75 BPM
EOSINOPHILS ABSOLUTE: 0.2 K/UL (ref 0–0.6)
EOSINOPHILS RELATIVE PERCENT: 2.4 %
ESTIMATED AVERAGE GLUCOSE: 131.2 MG/DL
GFR AFRICAN AMERICAN: >60
GFR AFRICAN AMERICAN: >60
GFR NON-AFRICAN AMERICAN: >60
GFR NON-AFRICAN AMERICAN: >60
GLUCOSE BLD-MCNC: 112 MG/DL (ref 70–99)
GLUCOSE BLD-MCNC: 118 MG/DL (ref 70–99)
GLUCOSE BLD-MCNC: 147 MG/DL (ref 70–99)
GLUCOSE BLD-MCNC: 151 MG/DL (ref 70–99)
GLUCOSE BLD-MCNC: 250 MG/DL (ref 70–99)
HBA1C MFR BLD: 6.2 %
HCT VFR BLD CALC: 42.7 % (ref 36–48)
HEMOGLOBIN: 14.4 G/DL (ref 12–16)
INR BLD: 1.03 (ref 0.86–1.14)
LYMPHOCYTES ABSOLUTE: 2.6 K/UL (ref 1–5.1)
LYMPHOCYTES RELATIVE PERCENT: 27.1 %
MAGNESIUM: 2 MG/DL (ref 1.8–2.4)
MCH RBC QN AUTO: 28 PG (ref 26–34)
MCHC RBC AUTO-ENTMCNC: 33.6 G/DL (ref 31–36)
MCV RBC AUTO: 83.4 FL (ref 80–100)
MONOCYTES ABSOLUTE: 0.7 K/UL (ref 0–1.3)
MONOCYTES RELATIVE PERCENT: 7.2 %
NEUTROPHILS ABSOLUTE: 6 K/UL (ref 1.7–7.7)
NEUTROPHILS RELATIVE PERCENT: 62 %
PDW BLD-RTO: 13.5 % (ref 12.4–15.4)
PERFORMED ON: ABNORMAL
PLATELET # BLD: 295 K/UL (ref 135–450)
PMV BLD AUTO: 8.2 FL (ref 5–10.5)
POTASSIUM REFLEX MAGNESIUM: 3.8 MMOL/L (ref 3.5–5.1)
POTASSIUM SERPL-SCNC: 4.4 MMOL/L (ref 3.5–5.1)
PROTHROMBIN TIME: 12 SEC (ref 10–13.2)
RBC # BLD: 5.12 M/UL (ref 4–5.2)
SARS-COV-2, NAAT: NOT DETECTED
SODIUM BLD-SCNC: 133 MMOL/L (ref 136–145)
SODIUM BLD-SCNC: 136 MMOL/L (ref 136–145)
TROPONIN: 0.16 NG/ML
TROPONIN: 0.73 NG/ML
TROPONIN: 1.99 NG/ML
TROPONIN: 2.03 NG/ML
TROPONIN: 2.19 NG/ML
TROPONIN: <0.01 NG/ML
WBC # BLD: 9.7 K/UL (ref 4–11)

## 2020-09-16 PROCEDURE — 85610 PROTHROMBIN TIME: CPT

## 2020-09-16 PROCEDURE — 85730 THROMBOPLASTIN TIME PARTIAL: CPT

## 2020-09-16 PROCEDURE — 96374 THER/PROPH/DIAG INJ IV PUSH: CPT

## 2020-09-16 PROCEDURE — 83036 HEMOGLOBIN GLYCOSYLATED A1C: CPT

## 2020-09-16 PROCEDURE — 93005 ELECTROCARDIOGRAM TRACING: CPT | Performed by: STUDENT IN AN ORGANIZED HEALTH CARE EDUCATION/TRAINING PROGRAM

## 2020-09-16 PROCEDURE — 6370000000 HC RX 637 (ALT 250 FOR IP): Performed by: STUDENT IN AN ORGANIZED HEALTH CARE EDUCATION/TRAINING PROGRAM

## 2020-09-16 PROCEDURE — 6370000000 HC RX 637 (ALT 250 FOR IP): Performed by: EMERGENCY MEDICINE

## 2020-09-16 PROCEDURE — 80048 BASIC METABOLIC PNL TOTAL CA: CPT

## 2020-09-16 PROCEDURE — 93005 ELECTROCARDIOGRAM TRACING: CPT | Performed by: EMERGENCY MEDICINE

## 2020-09-16 PROCEDURE — 2060000000 HC ICU INTERMEDIATE R&B

## 2020-09-16 PROCEDURE — 85520 HEPARIN ASSAY: CPT

## 2020-09-16 PROCEDURE — 6370000000 HC RX 637 (ALT 250 FOR IP): Performed by: INTERNAL MEDICINE

## 2020-09-16 PROCEDURE — 85025 COMPLETE CBC W/AUTO DIFF WBC: CPT

## 2020-09-16 PROCEDURE — 84484 ASSAY OF TROPONIN QUANT: CPT

## 2020-09-16 PROCEDURE — 83735 ASSAY OF MAGNESIUM: CPT

## 2020-09-16 PROCEDURE — 6360000002 HC RX W HCPCS: Performed by: STUDENT IN AN ORGANIZED HEALTH CARE EDUCATION/TRAINING PROGRAM

## 2020-09-16 PROCEDURE — 36415 COLL VENOUS BLD VENIPUNCTURE: CPT

## 2020-09-16 PROCEDURE — U0003 INFECTIOUS AGENT DETECTION BY NUCLEIC ACID (DNA OR RNA); SEVERE ACUTE RESPIRATORY SYNDROME CORONAVIRUS 2 (SARS-COV-2) (CORONAVIRUS DISEASE [COVID-19]), AMPLIFIED PROBE TECHNIQUE, MAKING USE OF HIGH THROUGHPUT TECHNOLOGIES AS DESCRIBED BY CMS-2020-01-R: HCPCS

## 2020-09-16 PROCEDURE — 93010 ELECTROCARDIOGRAM REPORT: CPT | Performed by: INTERNAL MEDICINE

## 2020-09-16 PROCEDURE — 71045 X-RAY EXAM CHEST 1 VIEW: CPT

## 2020-09-16 PROCEDURE — 6360000002 HC RX W HCPCS: Performed by: EMERGENCY MEDICINE

## 2020-09-16 PROCEDURE — 99254 IP/OBS CNSLTJ NEW/EST MOD 60: CPT | Performed by: INTERNAL MEDICINE

## 2020-09-16 PROCEDURE — U0002 COVID-19 LAB TEST NON-CDC: HCPCS

## 2020-09-16 PROCEDURE — 99285 EMERGENCY DEPT VISIT HI MDM: CPT

## 2020-09-16 RX ORDER — HEPARIN SODIUM 5000 [USP'U]/ML
2000 INJECTION, SOLUTION INTRAVENOUS; SUBCUTANEOUS PRN
Status: DISCONTINUED | OUTPATIENT
Start: 2020-09-16 | End: 2020-09-22

## 2020-09-16 RX ORDER — INSULIN LISPRO 100 [IU]/ML
0-6 INJECTION, SOLUTION INTRAVENOUS; SUBCUTANEOUS
Status: DISCONTINUED | OUTPATIENT
Start: 2020-09-16 | End: 2020-09-22

## 2020-09-16 RX ORDER — HEPARIN SODIUM 10000 [USP'U]/100ML
12 INJECTION, SOLUTION INTRAVENOUS CONTINUOUS
Status: DISCONTINUED | OUTPATIENT
Start: 2020-09-16 | End: 2020-09-22

## 2020-09-16 RX ORDER — ATORVASTATIN CALCIUM 40 MG/1
80 TABLET, FILM COATED ORAL NIGHTLY
Status: DISCONTINUED | OUTPATIENT
Start: 2020-09-16 | End: 2020-09-26 | Stop reason: HOSPADM

## 2020-09-16 RX ORDER — HEPARIN SODIUM 5000 [USP'U]/ML
2000 INJECTION, SOLUTION INTRAVENOUS; SUBCUTANEOUS PRN
Status: DISCONTINUED | OUTPATIENT
Start: 2020-09-16 | End: 2020-09-16

## 2020-09-16 RX ORDER — CLOPIDOGREL BISULFATE 75 MG/1
75 TABLET ORAL DAILY
Status: DISCONTINUED | OUTPATIENT
Start: 2020-09-16 | End: 2020-09-17

## 2020-09-16 RX ORDER — DEXTROSE MONOHYDRATE 25 G/50ML
12.5 INJECTION, SOLUTION INTRAVENOUS PRN
Status: DISCONTINUED | OUTPATIENT
Start: 2020-09-16 | End: 2020-09-22

## 2020-09-16 RX ORDER — ACETAMINOPHEN 650 MG/1
650 SUPPOSITORY RECTAL EVERY 6 HOURS PRN
Status: DISCONTINUED | OUTPATIENT
Start: 2020-09-16 | End: 2020-09-22

## 2020-09-16 RX ORDER — INSULIN LISPRO 100 [IU]/ML
0-3 INJECTION, SOLUTION INTRAVENOUS; SUBCUTANEOUS NIGHTLY
Status: DISCONTINUED | OUTPATIENT
Start: 2020-09-16 | End: 2020-09-22

## 2020-09-16 RX ORDER — ACETAMINOPHEN 325 MG/1
650 TABLET ORAL EVERY 6 HOURS PRN
Status: DISCONTINUED | OUTPATIENT
Start: 2020-09-16 | End: 2020-09-22

## 2020-09-16 RX ORDER — AMLODIPINE BESYLATE 5 MG/1
5 TABLET ORAL DAILY
Status: DISCONTINUED | OUTPATIENT
Start: 2020-09-16 | End: 2020-09-26 | Stop reason: HOSPADM

## 2020-09-16 RX ORDER — SODIUM CHLORIDE 0.9 % (FLUSH) 0.9 %
10 SYRINGE (ML) INJECTION PRN
Status: DISCONTINUED | OUTPATIENT
Start: 2020-09-16 | End: 2020-09-22

## 2020-09-16 RX ORDER — ONDANSETRON 2 MG/ML
4 INJECTION INTRAMUSCULAR; INTRAVENOUS EVERY 6 HOURS PRN
Status: DISCONTINUED | OUTPATIENT
Start: 2020-09-16 | End: 2020-09-22

## 2020-09-16 RX ORDER — HEPARIN SODIUM 5000 [USP'U]/ML
4000 INJECTION, SOLUTION INTRAVENOUS; SUBCUTANEOUS PRN
Status: DISCONTINUED | OUTPATIENT
Start: 2020-09-16 | End: 2020-09-22

## 2020-09-16 RX ORDER — ASPIRIN 81 MG/1
81 TABLET, CHEWABLE ORAL DAILY
Status: COMPLETED | OUTPATIENT
Start: 2020-09-17 | End: 2020-09-21

## 2020-09-16 RX ORDER — HEPARIN SODIUM 5000 [USP'U]/ML
4000 INJECTION, SOLUTION INTRAVENOUS; SUBCUTANEOUS PRN
Status: DISCONTINUED | OUTPATIENT
Start: 2020-09-16 | End: 2020-09-16

## 2020-09-16 RX ORDER — LOSARTAN POTASSIUM 25 MG/1
25 TABLET ORAL DAILY
Status: DISCONTINUED | OUTPATIENT
Start: 2020-09-16 | End: 2020-09-16 | Stop reason: ALTCHOICE

## 2020-09-16 RX ORDER — NICOTINE POLACRILEX 4 MG
15 LOZENGE BUCCAL PRN
Status: DISCONTINUED | OUTPATIENT
Start: 2020-09-16 | End: 2020-09-22

## 2020-09-16 RX ORDER — SODIUM CHLORIDE 0.9 % (FLUSH) 0.9 %
10 SYRINGE (ML) INJECTION EVERY 12 HOURS SCHEDULED
Status: DISCONTINUED | OUTPATIENT
Start: 2020-09-16 | End: 2020-09-22

## 2020-09-16 RX ORDER — HEPARIN SODIUM 5000 [USP'U]/ML
4000 INJECTION, SOLUTION INTRAVENOUS; SUBCUTANEOUS ONCE
Status: COMPLETED | OUTPATIENT
Start: 2020-09-16 | End: 2020-09-16

## 2020-09-16 RX ORDER — NITROGLYCERIN 0.4 MG/1
0.4 TABLET SUBLINGUAL EVERY 5 MIN PRN
Status: DISCONTINUED | OUTPATIENT
Start: 2020-09-16 | End: 2020-09-22

## 2020-09-16 RX ORDER — PROMETHAZINE HYDROCHLORIDE 25 MG/1
12.5 TABLET ORAL EVERY 6 HOURS PRN
Status: DISCONTINUED | OUTPATIENT
Start: 2020-09-16 | End: 2020-09-26 | Stop reason: HOSPADM

## 2020-09-16 RX ORDER — NITROGLYCERIN 0.4 MG/1
0.4 TABLET SUBLINGUAL ONCE
Status: COMPLETED | OUTPATIENT
Start: 2020-09-16 | End: 2020-09-16

## 2020-09-16 RX ORDER — POLYETHYLENE GLYCOL 3350 17 G/17G
17 POWDER, FOR SOLUTION ORAL DAILY PRN
Status: DISCONTINUED | OUTPATIENT
Start: 2020-09-16 | End: 2020-09-26 | Stop reason: HOSPADM

## 2020-09-16 RX ORDER — HEPARIN SODIUM 10000 [USP'U]/100ML
12 INJECTION, SOLUTION INTRAVENOUS CONTINUOUS
Status: DISCONTINUED | OUTPATIENT
Start: 2020-09-16 | End: 2020-09-16

## 2020-09-16 RX ORDER — DEXTROSE MONOHYDRATE 50 MG/ML
100 INJECTION, SOLUTION INTRAVENOUS PRN
Status: DISCONTINUED | OUTPATIENT
Start: 2020-09-16 | End: 2020-09-22

## 2020-09-16 RX ADMIN — ATORVASTATIN CALCIUM 80 MG: 40 TABLET, FILM COATED ORAL at 20:50

## 2020-09-16 RX ADMIN — CLOPIDOGREL BISULFATE 75 MG: 75 TABLET ORAL at 11:17

## 2020-09-16 RX ADMIN — ASPIRIN 325 MG: 325 TABLET, DELAYED RELEASE ORAL at 00:50

## 2020-09-16 RX ADMIN — HEPARIN SODIUM AND DEXTROSE 12 UNITS/KG/HR: 10000; 5 INJECTION INTRAVENOUS at 03:10

## 2020-09-16 RX ADMIN — NITROGLYCERIN 0.4 MG: 0.4 TABLET, ORALLY DISINTEGRATING SUBLINGUAL at 00:50

## 2020-09-16 RX ADMIN — METOPROLOL TARTRATE 12.5 MG: 25 TABLET, FILM COATED ORAL at 20:51

## 2020-09-16 RX ADMIN — HEPARIN SODIUM 2000 UNITS: 5000 INJECTION INTRAVENOUS; SUBCUTANEOUS at 11:10

## 2020-09-16 RX ADMIN — METOPROLOL TARTRATE 12.5 MG: 25 TABLET, FILM COATED ORAL at 11:17

## 2020-09-16 RX ADMIN — HEPARIN SODIUM 4000 UNITS: 5000 INJECTION INTRAVENOUS; SUBCUTANEOUS at 03:10

## 2020-09-16 RX ADMIN — LIDOCAINE HYDROCHLORIDE: 20 SOLUTION ORAL; TOPICAL at 01:43

## 2020-09-16 RX ADMIN — NITROGLYCERIN 0.4 MG: 0.4 TABLET SUBLINGUAL at 03:16

## 2020-09-16 RX ADMIN — AMLODIPINE BESYLATE 5 MG: 5 TABLET ORAL at 08:21

## 2020-09-16 ASSESSMENT — ENCOUNTER SYMPTOMS
SINUS PAIN: 0
DIARRHEA: 0
COUGH: 0
SORE THROAT: 0
CONSTIPATION: 0
RHINORRHEA: 0
CHEST TIGHTNESS: 0
SINUS PRESSURE: 0
NAUSEA: 1
COLOR CHANGE: 0
SHORTNESS OF BREATH: 0
BACK PAIN: 0
ABDOMINAL PAIN: 0
CHEST TIGHTNESS: 1
SHORTNESS OF BREATH: 1
VOMITING: 0

## 2020-09-16 ASSESSMENT — PAIN DESCRIPTION - DESCRIPTORS: DESCRIPTORS: PRESSURE

## 2020-09-16 ASSESSMENT — PAIN SCALES - GENERAL
PAINLEVEL_OUTOF10: 0
PAINLEVEL_OUTOF10: 2
PAINLEVEL_OUTOF10: 8
PAINLEVEL_OUTOF10: 0
PAINLEVEL_OUTOF10: 0

## 2020-09-16 ASSESSMENT — HEART SCORE: ECG: 0

## 2020-09-16 ASSESSMENT — PAIN DESCRIPTION - FREQUENCY: FREQUENCY: CONTINUOUS

## 2020-09-16 ASSESSMENT — PAIN DESCRIPTION - LOCATION: LOCATION: CHEST;SHOULDER

## 2020-09-16 ASSESSMENT — PAIN DESCRIPTION - ORIENTATION: ORIENTATION: LEFT

## 2020-09-16 NOTE — ED NOTES
EKG completed. Machine interpretation sent to MD Shelli over [employer]. COVID swab also completed at this time.      Avel Saunders RN  09/16/20 1922

## 2020-09-16 NOTE — PROGRESS NOTES
4 Eyes Admission Assessment     I agree as the admission nurse that 2 RN's have performed a thorough Head to Toe Skin Assessment on the patient. ALL assessment sites listed below have been assessed on admission. Areas assessed by both nurses: Connor Hogan RN  [x]   Head, Face, and Ears   [x]   Shoulders, Back, and Chest  [x]   Arms, Elbows, and Hands   [x]   Coccyx, Sacrum, and Ischium  [x]   Legs, Feet, and Heels        Does the Patient have Skin Breakdown?   No         Shailesh Prevention initiated:  No   Wound Care Orders initiated:  No      [facility] nurse consulted for Pressure Injury (Stage 3,4, Unstageable, DTI, NWPT, and Complex wounds) or Shailesh score 18 or lower:  No      Nurse 1 eSignature: Deja Sinclair    **SHARE this note so that the co-signing nurse is able to place an eSignature**    Nurse 2 eSignature: {Esignature:[id number]}

## 2020-09-16 NOTE — PROGRESS NOTES
73 13 100 %   09/16/20 0608 (!) 155/83 -- -- 80 15 99 %   09/16/20 0330 (!) 145/77 -- -- 72 16 99 %   09/16/20 0300 (!) 157/93 -- -- 74 19 100 %   09/16/20 0230 138/75 -- -- 68 17 96 %     No intake or output data in the 24 hours ending 09/16/20 1002    Review of Systems   Constitutional: Negative for chills and fever. Respiratory: Negative for cough, chest tightness and shortness of breath. Cardiovascular: Negative for chest pain and leg swelling. Gastrointestinal: Negative for abdominal pain. Genitourinary: Negative for dysuria. Neurological: Negative for dizziness and light-headedness. Physical Exam  Vitals signs reviewed. Constitutional:       General: She is not in acute distress. Appearance: She is not diaphoretic. HENT:      Head: Normocephalic. Cardiovascular:      Rate and Rhythm: Normal rate and regular rhythm. Heart sounds: Murmur present. Pulmonary:      Effort: Pulmonary effort is normal. No respiratory distress. Breath sounds: No wheezing. Abdominal:      General: Bowel sounds are normal.      Tenderness: There is no guarding or rebound. Neurological:      General: No focal deficit present. Mental Status: She is alert and oriented to person, place, and time. LABS:    CBC:   Recent Labs     09/16/20  0024   WBC 9.7   HGB 14.4   HCT 42.7      MCV 83.4     Renal:    Recent Labs     09/16/20  0024 09/16/20  0506   * 136   K 3.8 4.4   CL 97* 100   CO2 25 28   BUN 21* 20   CREATININE 0.8 0.6   GLUCOSE 250* 147*   CALCIUM 9.5 9.7   MG  --  2.00   ANIONGAP 11 8     Hepatic: No results for input(s): AST, ALT, BILITOT, BILIDIR, PROT, LABALBU, ALKPHOS in the last 72 hours. Troponin:   Recent Labs     09/16/20  0024 09/16/20  0206 09/16/20  0506   TROPONINI <0.01 0.16* 0.73*     BNP: No results for input(s): BNP in the last 72 hours. Lipids: No results for input(s): CHOL, HDL in the last 72 hours.     Invalid input(s): LDLCALCU,

## 2020-09-16 NOTE — ED NOTES
Bed: C28-28  Expected date:   Expected time:   Means of arrival:   Comments:  Colleen Fofana RN  09/16/20 [id number]

## 2020-09-16 NOTE — ED PROVIDER NOTES
[address] [facility] RESIDENT NOTE       Date of evaluation: 9/16/2020    Chief Complaint     Chest Pain      History of Present Illness     Jeremiah Rice is a 63 y.o. female with a past medical history of hypertension who presents with acute onset of chest pain 10:15 PM today. Patient states that points to the upper right and mid chest initially, 7 out of 10 intensity, feel like \"somebody sitting\" on her chest, radiates to her left shoulder, and is accompanied by nausea. Patient denies any alleviating or aggravating factors for the pain. She denies taking nitroglycerin or aspirin. She denies taking any medications for pain. She reports some nausea along with the pain. She states that she has had 4 such episodes of pain in the last week and a half, and those episodes typically subsided with Tums. Patient stated that she took Tums today but that did not help provide relief from the pain and the pain is still persistent which prompted her to present to the ED. Patient denies cough. She does report mild shortness of breath along with her chest pain. She denies fevers, chills, abdominal pain, dysuria or changes in frequency of urination. She denies palpitations or lightheadedness. Review of Systems     Review of Systems as above    Past Medical, Surgical, Family, and Social History     She has a past medical history of Arthritis and Hypertension. She has a past surgical history that includes knee surgery (2009) and Carpal tunnel release (2011). Her family history includes Heart Attack in her father; Heart Disease in her father and mother; Kidney Disease in her mother. She reports that she has been smoking. She has a 10.00 pack-year smoking history. She has never used smokeless tobacco. She reports that she does not drink alcohol or use drugs.     Medications     Previous Medications    AMLODIPINE (NORVASC) 5 MG TABLET    Take 5 mg by mouth daily FamilyHx were reviewed. The patient was giventhe following medications:  Orders Placed This Encounter   Medications    aspirin EC tablet 325 mg    nitroGLYCERIN (NITROSTAT) SL tablet 0.4 mg       CONSULTS:  None    MEDICAL DECISION MAKING / ASSESSMENT / [place] is a 63 y.o. female with a past medical history of hypertension presents with acute onset of chest pain starting around 2 hours prior to admission. Patient states the pain is pressure-like as if there is a person sitting on her chest, and is radiating to her left shoulder accompanied by nausea. Of note, patient has had prior chest pain like this in 2/2020 and she underwent work-up including troponin and a stress test.  Stress test was unremarkable for ischemic changes. Patient states that she has had 4 similar episodes of pain over the last week and a half and given her current history and presenting symptoms, there is concern for acute coronary syndrome, either unstable angina or an STEMI. Initial EKG was unremarkable for any ST or T wave changes. Though her negative stress test is reassuring, we will order CBC, BMP, troponin, chest x-ray. I will also give the patient a dose of aspirin 325 mg and nitroglycerin sublingual.  Patient has a HEART score of 4. Patient will likely need two serial troponins. If troponins are negative, given her recent negative stress test in the setting of heart score of 4 and KAPIL score of 1, patient can be discharged with outpatient cardiology follow-up as soon as possible after discharge. She will be discharged on aspirin 81 mg daily. If troponins are positive, she will likely need a repeat EKG and also cardiology consult for further evaluation followed by admission for further work-up including possible angiogram.  Patient's glucose was elevated at 250 and there is no documented or known history of diabetes according to patient. A hemoglobin A1c level has been ordered.     I have signed out the

## 2020-09-16 NOTE — ED NOTES
Patient up to bathroom. Patient tolerated well. Patient placed back in bed and on monitor. Patient denies any further needs at this time. Patient denies chest pain as well at this time.      Colleen Kwon RN  09/16/20 0975 [facility], RN  09/16/20 0351

## 2020-09-16 NOTE — ED PROVIDER NOTES
ED Attending Attestation Note     Date of evaluation: 9/16/2020    This patient was seen by the resident. I have seen and examined the patient, agree with the workup, evaluation, management and diagnosis. The care plan has been discussed. I have reviewed the ECG and concur with the resident's interpretation. My assessment reveals complaints of chest pain. Patient notes intermittent chest pain that is been going on this evening. She states she had similar pain several months ago for which she was seen and admitted to the hospital and had a negative stress test.  Patient states she has been having issues with acid reflux earlier in the week which she was taking Tums for. On arrival, the patient is stable vital signs. She has clear breath sounds and normal heart sounds. She has no reproducible tenderness. EKG is normal sinus with nonspecific ST segment changes. Initial laboratory studies are unremarkable including negative troponin. She did have a glucose of 250 but has no history of diabetes in the past.  Patient was written for a GI cocktail without significant change in her symptoms. Repeat troponin was obtained which is elevated 0.16. Patient had been given aspirin on arrival and was started on a heparin drip. Repeat EKG was obtained which interpreted by me shows the patient to be in a normal sinus rhythm with left axis deviation, normal TX and QT intervals, normal QRS duration, ST depression present in V2 and V3 but no ST elevations noted. Patient will be admitted to the hospitalist service for further cardiac evaluation. Clinical impression:  1.   NSTEMI      Lazarus Snuffer, MD  09/16/20 5817

## 2020-09-16 NOTE — PROGRESS NOTES
4 Eyes Admission Assessment     I agree as the admission nurse that 2 RN's have performed a thorough Head to Toe Skin Assessment on the patient. ALL assessment sites listed below have been assessed on admission. Areas assessed by both nurses:   [x]   Head, Face, and Ears   [x]   Shoulders, Back, and Chest  [x]   Arms, Elbows, and Hands   [x]   Coccyx, Sacrum, and Ischium  [x]   Legs, Feet, and Heels        Does the Patient have Skin Breakdown?   No         Shailesh Prevention initiated:  NA   Wound Care Orders initiated:  NA      WOC nurse consulted for Pressure Injury (Stage 3,4, Unstageable, DTI, NWPT, and Complex wounds) or Shailesh score 18 or lower:  NA      Nurse 1 eSignature: Electronically signed by Dhruv Freeman RN on 9/16/20 at 7:01 PM EDT    **SHARE this note so that the co-signing nurse is able to place an eSignature**    Nurse 2 eSignature: Electronically signed by Pat Davila RN on 9/16/20 at 7:02 PM EDT

## 2020-09-16 NOTE — CARE COORDINATION
Referred to patient for d/c planning. Spoke to patient. Patient is a 63year old female admitted for NSTEMI. Patient usually lives at home with significant other. Patient drives and works daily. Patient is independent in ADLs. Patient denies d/c needs at this time. Case Management Assessment           Initial Evaluation                Date / Time of Evaluation: 9/16/2020 3:46 PM                 Assessment Completed by: Bessy Shelton    Patient Name: Leland Bello     YOB: 1960  Diagnosis: NSTEMI (non-ST elevated myocardial infarction) [facility]) [I21.4]  NSTEMI (non-ST elevated myocardial infarction) [facility]) [I21.4]     Date / Time: 9/16/2020 12:06 AM    Patient Admission Status: Inpatient    If patient is discharged prior to next notation, then this note serves as note for discharge by case management. Current PCP: No primary care provider on file. Clinic Patient: No    Chart Reviewed: Yes  Patient/ Family Interviewed: Yes    Initial assessment completed at bedside with: Patient    Hospitalization in the last 30 days: No    Emergency Contacts:  Extended Emergency Contact Information  Primary Emergency Contact: Ariel Mojica   [address] Phone: [phone]  Relation: Other    Advance Directives:   Code Status: Full [address]: No    Financial  Payor: Royal Chavez / Plan: Royal Chavez / Product Type: *No Product type* /     Pre-cert required for SNF: Yes    Pharmacy    [address] [facility], [address] [address]  Phone: [phone] Fax: [phone]      Potential assistance Purchasing Medications: Potential Assistance Purchasing Medications: No  Does Patient want to participate in local refill/ meds to beds program?: No    Meds To Beds General Rules:  1. Can ONLY be done Monday- Friday between 8:30am-5pm  2.  Prescription(s) must be in pharmacy by 3pm to be filled same day  3. Copy of patient's insurance/ prescription drug card and patient face sheet must be sent along with the prescription(s)  4. Cost of Rx cannot be added to hospital bill. If financial assistance is needed, please contact unit  or ;  or  CANNOT provide pharmacy voucher for patients co-pays  5.  Patients can then  the prescription on their way out of the hospital at discharge, or pharmacy can deliver to the bedside if staff is available. (payment due at time of pick-up or delivery - cash, check, or card accepted)     Able to afford home medications/ co-pay costs: Yes    ADLS  Support Systems: Spouse/Significant Other, Children    PT AM-PAC:   /24  OT AM-PAC:   /24    [place]: home with signifcant other  Steps:     Plans to RETURN to current housing: Yes  Barriers to RETURNING to current housing: medical complications    Home Care Information  Currently ACTIVE with [address]: No  Home Care Agency: Not Applicable    Currently ACTIVE with [place] [facility]: No      Durable Medical Equipment  DME Provider:   Equipment: none noted    Home Oxygen and Respiratory Equipment  Has HOME OXYGEN prior to admission: No  [address]: Not Applicable    DISCHARGE PLAN:  Disposition: Home- No Services Needed    Transportation PLAN for discharge: family     Factors facilitating achievement of predicted outcomes: Family support, Friend support, Motivated, Cooperative and Pleasant    Barriers to discharge: Medical complications    Additional Case Management Notes: see above    The Plan for Transition of Care is related to the following treatment goals of NSTEMI (non-ST elevated myocardial infarction) ([facility] [address] [I21.4]  NSTEMI (non-ST elevated myocardial infarction) ([facility] [address] [I21.4]    The Patient and/or patient representative Nilam and her family were provided with a choice of provider and agrees with the discharge plan Not

## 2020-09-16 NOTE — H&P
Internal Medicine   History and Physical  PGY-3      Patient's PCP: No primary care provider on file. Date of Admission: 9/16/2020    Date of Service: Pt seen/examined on 9/16/2020 and Admitted to Inpatient with expected LOS greater than two midnights due to medical therapy. CC: Chest Pain    HISTORY OF PRESENT ILLNESS:   63 y.o. female with a PMH HTN presenting with chest pain. Patient notes she started having chest pain while resting at approx 10:15 this evening, started mid to right sternum and radiating to her left shoulder. Pain is 8/10 in intensity and feels as though she had something sitting on her chest. Also had associated diaphoresis, nausea, and SOB. Pain was alleviated with SL nitro. She has been having intermittent episodes of what she attributed to heartburn for the last week. She states the pain would happen at night and would improve with Tums. She does not take ASA. Current 1/2 PPD smoker for the last 30 years. Denies alcohol use or recreational drug use. Father with history of CAD in his 46s. Patient had low risk stress test 2/2020. Past Medical / Surgical History:    Past Medical History:   Diagnosis Date    Arthritis     Hypertension      Past Surgical History:   Procedure Laterality Date    CARPAL TUNNEL RELEASE  2011    right  hand    KNEE SURGERY  2009    right knee  replacement       Medications Prior to Admission:    Prior to Admission medications    Medication Sig Start Date End Date Taking? Authorizing Provider   losartan (COZAAR) 25 MG tablet Take 1 tablet by mouth daily 2/28/20 3/29/20  Tiffanie Herzog MD   amLODIPine (NORVASC) 5 MG tablet Take 5 mg by mouth daily  2/25/20   Historical Provider, MD   Multiple Vitamins-Minerals (THERAPEUTIC MULTIVITAMIN-MINERALS) tablet Take 1 tablet by mouth daily    Historical Provider, MD   traMADol (ULTRAM) 50 MG tablet Take 50 mg by mouth 2 times daily as needed (arthritis pain).     Historical Provider, MD   oxymetazoline Cleone Bamberger) 0.05 % nasal spray 2 sprays by Nasal route 2 times daily as needed for Congestion    Historical Provider, MD       Allergies:  Celebrex [celecoxib]; Lisinopril; and Macrodantin [nitrofurantoin macrocrystal]    Social History:   TOBACCO:   reports that she has been smoking. She has a 10.00 pack-year smoking history. She has never used smokeless tobacco.     ETOH:   reports no history of alcohol use. Patient currently lives with significant other. Family History:       Problem Relation Age of Onset    Heart Disease Mother     Kidney Disease Mother     Heart Disease Father     Heart Attack Father        ROS:   Review of Systems   Constitutional: Positive for diaphoresis. Negative for chills, fatigue and fever. HENT: Negative for rhinorrhea, sinus pressure, sinus pain and sore throat. Respiratory: Positive for chest tightness and shortness of breath. Negative for cough. Cardiovascular: Negative for palpitations and leg swelling. Gastrointestinal: Positive for nausea. Negative for abdominal pain, constipation, diarrhea and vomiting. Genitourinary: Negative for frequency and urgency. Musculoskeletal: Negative for back pain, neck pain and neck stiffness. Skin: Negative for color change, pallor and rash. Neurological: Negative for dizziness, speech difficulty, weakness, light-headedness, numbness and headaches. All other systems reviewed and are negative. PHYSICAL EXAM:  BP (!) 145/77   Pulse 72   Temp 97.8 °F (36.6 °C) (Oral)   Resp 16   Wt 159 lb (72.1 kg)   SpO2 99%   BMI 29.08 kg/m²   No results for input(s): POCGLU in the last 72 hours. Physical Exam  Constitutional:       General: She is not in acute distress. Appearance: She is normal weight. She is not ill-appearing, toxic-appearing or diaphoretic. HENT:      Head: Normocephalic. Eyes:      General: No scleral icterus. Right eye: No discharge. Left eye: No discharge.       Extraocular Movements: Extraocular movements intact. Pupils: Pupils are equal, round, and reactive to light. Neck:      Musculoskeletal: Normal range of motion and neck supple. No neck rigidity. Cardiovascular:      Rate and Rhythm: Normal rate and regular rhythm. Pulses: Normal pulses. Heart sounds: Murmur (systolic murmur ) present. Pulmonary:      Effort: Pulmonary effort is normal. No respiratory distress. Breath sounds: Normal breath sounds. No wheezing, rhonchi or rales. Abdominal:      General: Abdomen is flat. Bowel sounds are normal. There is no distension. Palpations: Abdomen is soft. Tenderness: There is no abdominal tenderness. There is no guarding or rebound. Musculoskeletal:         General: No swelling or tenderness. Skin:     General: Skin is warm and dry. Coloration: Skin is not jaundiced. Neurological:      General: No focal deficit present. Mental Status: She is alert. Mental status is at baseline. Cranial Nerves: No cranial nerve deficit. LABS:  Recent Labs     09/16/20  0024   WBC 9.7   HGB 14.4   HCT 42.7        Recent Labs     09/16/20  0024   *   K 3.8   CL 97*   CO2 25   BUN 21*   CREATININE 0.8   GLUCOSE 250*       Recent Labs     09/16/20  0024 09/16/20  0206   TROPONINI <0.01 0.16*       Recent Labs     09/16/20  0024   INR 1.03            Urinalysis:  Lab Results   Component Value Date    NITRU Negative 08/04/2015    WBCUA 3-5 08/04/2015    BACTERIA Rare 08/04/2015    RBCUA 3-5 08/04/2015    BLOODU SMALL 08/04/2015    SPECGRAV 1.010 08/04/2015    GLUCOSEU Negative 08/04/2015       Radiology:       XR CHEST PORTABLE   Final Result     No evidence of acute cardiopulmonary disease. Assessment & Plan:      NSTEMI - Patient presents with typical chest pain. trop trend: <0.01 --> 0.16. EKG with t-wave inversion V1, ST depression V2 and V3.  KAPIL score 3.   - cardiology consulted, appreciate recs  - NPO  - heparin gtt  - ASA/statin  - SL nitro PRN  - repeat EKG in AM  - telemetry   - continue home cozaar   - check lipid panel    Hyperglycemia - glucose 250 on renal panel. No prior history of diabetes  - check A1c  - hypoglycemia protocol  - accuchecks AC/HS  - LDSSI    HTN  - continue home norvac and cozaar    Code Status:  Full Code  F/E/N:  Diet NPO Effective Now  GI / DVT Prophylaxis:  Heparin gtt  Disposition:  GMF      I will discuss the patient with Dr. Micael Dubin.      Ro Castrejon MD,   Internal Medicine Resident, PGY-3

## 2020-09-16 NOTE — ED NOTES
Pt c/o increased cp after MD left room with update. Pt given second dose of nitro. sts improvement at this time. NAD noted. Resting comfortably. Repeat EKG obtained.       Jessica Ram RN  09/16/20 9539

## 2020-09-16 NOTE — CONSULTS
SpO2: 100%    Weight: 159 lb (72.1 kg)         Labs  CBC:   Lab Results   Component Value Date    WBC 9.7 09/16/2020    RBC 5.12 09/16/2020    HGB 14.4 09/16/2020    HCT 42.7 09/16/2020    MCV 83.4 09/16/2020    RDW 13.5 09/16/2020     09/16/2020     CMP:    Lab Results   Component Value Date     09/16/2020    K 4.4 09/16/2020    K 3.8 09/16/2020     09/16/2020    CO2 28 09/16/2020    BUN 20 09/16/2020    CREATININE 0.6 09/16/2020    GFRAA >60 09/16/2020    LABGLOM >60 09/16/2020    GLUCOSE 147 09/16/2020    CALCIUM 9.7 09/16/2020     PT/INR:  No results found for: PTINR  Lab Results   Component Value Date    TROPONINI 2.03 ([place]) 09/16/2020       EKG:  I have reviewed EKG with the following interpretation:  Impression:  NSR, ST depression early precordial leads> NSR WNL this am.    Due to the current efforts to prevent transmission of COVID-19 and also the need to preserve PPE for other caregivers, a face-to-face encounter with the patient was not performed. That being said, all relevant records and diagnostic tests were reviewed, including laboratory results and imaging. Please reference any relevant documentation elsewhere. Care will be coordinated with the primary service. Assessment  Patient Active Problem List   Diagnosis    Uncontrolled hypertension    Chronic eczematous otitis externa of both ears    Cardiac murmur    Chronic pain syndrome    Chronic neck pain    Chest pain    NSTEMI (non-ST elevated myocardial infarction) ([facility] [address]         Plan:    Chest pain and trop suggestive on Non ST MI. Had ST changes early but as of this am EKG has normalized. No further pain since early this am.  Comfortable. Agree with full dose heparin therapy. ASA/statin. Will start B blocker. Plavix since Non ST.  COVID pending. Since stable and no further cp and EKG normalized will await COVID results prior to necessary cath.   If she destabilizes/recurrent cp will take emergently to cath lab.

## 2020-09-17 ENCOUNTER — APPOINTMENT (OUTPATIENT)
Dept: VASCULAR LAB | Age: 60
DRG: 234 | End: 2020-09-17
Payer: COMMERCIAL

## 2020-09-17 LAB
ANION GAP SERPL CALCULATED.3IONS-SCNC: 9 MMOL/L (ref 3–16)
ANTI-XA UNFRAC HEPARIN: 0.27 IU/ML (ref 0.3–0.7)
ANTI-XA UNFRAC HEPARIN: 0.37 IU/ML (ref 0.3–0.7)
ANTI-XA UNFRAC HEPARIN: 0.49 IU/ML (ref 0.3–0.7)
ANTI-XA UNFRAC HEPARIN: 0.58 IU/ML (ref 0.3–0.7)
BACTERIA: ABNORMAL /HPF
BILIRUBIN URINE: NEGATIVE
BLOOD, URINE: ABNORMAL
BUN BLDV-MCNC: 18 MG/DL (ref 7–20)
CALCIUM SERPL-MCNC: 9.3 MG/DL (ref 8.3–10.6)
CHLORIDE BLD-SCNC: 104 MMOL/L (ref 99–110)
CLARITY: CLEAR
CO2: 25 MMOL/L (ref 21–32)
COLOR: YELLOW
CREAT SERPL-MCNC: 0.6 MG/DL (ref 0.6–1.2)
EPITHELIAL CELLS, UA: ABNORMAL /HPF (ref 0–5)
GFR AFRICAN AMERICAN: >60
GFR NON-AFRICAN AMERICAN: >60
GLUCOSE BLD-MCNC: 112 MG/DL (ref 70–99)
GLUCOSE BLD-MCNC: 120 MG/DL (ref 70–99)
GLUCOSE BLD-MCNC: 125 MG/DL (ref 70–99)
GLUCOSE BLD-MCNC: 138 MG/DL (ref 70–99)
GLUCOSE BLD-MCNC: 146 MG/DL (ref 70–99)
GLUCOSE URINE: NEGATIVE MG/DL
HCT VFR BLD CALC: 44.1 % (ref 36–48)
HEMOGLOBIN: 14.4 G/DL (ref 12–16)
KETONES, URINE: ABNORMAL MG/DL
LEUKOCYTE ESTERASE, URINE: NEGATIVE
LV EF: 63 %
LVEF MODALITY: NORMAL
MAGNESIUM: 2.2 MG/DL (ref 1.8–2.4)
MCH RBC QN AUTO: 27.6 PG (ref 26–34)
MCHC RBC AUTO-ENTMCNC: 32.7 G/DL (ref 31–36)
MCV RBC AUTO: 84.4 FL (ref 80–100)
MICROSCOPIC EXAMINATION: YES
NITRITE, URINE: NEGATIVE
PDW BLD-RTO: 13.8 % (ref 12.4–15.4)
PERFORMED ON: ABNORMAL
PH UA: 6.5 (ref 5–8)
PLATELET # BLD: 286 K/UL (ref 135–450)
PMV BLD AUTO: 8.3 FL (ref 5–10.5)
POTASSIUM SERPL-SCNC: 5 MMOL/L (ref 3.5–5.1)
PROTEIN UA: NEGATIVE MG/DL
RBC # BLD: 5.23 M/UL (ref 4–5.2)
RBC UA: ABNORMAL /HPF (ref 0–4)
SARS-COV-2, PCR: NOT DETECTED
SODIUM BLD-SCNC: 138 MMOL/L (ref 136–145)
SPECIFIC GRAVITY UA: 1.01 (ref 1–1.03)
URINE TYPE: ABNORMAL
UROBILINOGEN, URINE: 0.2 E.U./DL
WBC # BLD: 7.6 K/UL (ref 4–11)
WBC UA: ABNORMAL /HPF (ref 0–5)

## 2020-09-17 PROCEDURE — 85520 HEPARIN ASSAY: CPT

## 2020-09-17 PROCEDURE — B41F1ZZ FLUOROSCOPY OF RIGHT LOWER EXTREMITY ARTERIES USING LOW OSMOLAR CONTRAST: ICD-10-PCS | Performed by: INTERNAL MEDICINE

## 2020-09-17 PROCEDURE — 2580000003 HC RX 258: Performed by: STUDENT IN AN ORGANIZED HEALTH CARE EDUCATION/TRAINING PROGRAM

## 2020-09-17 PROCEDURE — 99233 SBSQ HOSP IP/OBS HIGH 50: CPT | Performed by: INTERNAL MEDICINE

## 2020-09-17 PROCEDURE — 6360000002 HC RX W HCPCS: Performed by: INTERNAL MEDICINE

## 2020-09-17 PROCEDURE — 80048 BASIC METABOLIC PNL TOTAL CA: CPT

## 2020-09-17 PROCEDURE — 83735 ASSAY OF MAGNESIUM: CPT

## 2020-09-17 PROCEDURE — 87086 URINE CULTURE/COLONY COUNT: CPT

## 2020-09-17 PROCEDURE — 94761 N-INVAS EAR/PLS OXIMETRY MLT: CPT

## 2020-09-17 PROCEDURE — 2709999900 HC NON-CHARGEABLE SUPPLY

## 2020-09-17 PROCEDURE — 99152 MOD SED SAME PHYS/QHP 5/>YRS: CPT

## 2020-09-17 PROCEDURE — 6370000000 HC RX 637 (ALT 250 FOR IP): Performed by: INTERNAL MEDICINE

## 2020-09-17 PROCEDURE — 93458 L HRT ARTERY/VENTRICLE ANGIO: CPT | Performed by: INTERNAL MEDICINE

## 2020-09-17 PROCEDURE — 93458 L HRT ARTERY/VENTRICLE ANGIO: CPT

## 2020-09-17 PROCEDURE — 6360000004 HC RX CONTRAST MEDICATION: Performed by: INTERNAL MEDICINE

## 2020-09-17 PROCEDURE — 80061 LIPID PANEL: CPT

## 2020-09-17 PROCEDURE — C1769 GUIDE WIRE: HCPCS

## 2020-09-17 PROCEDURE — 93306 TTE W/DOPPLER COMPLETE: CPT

## 2020-09-17 PROCEDURE — 2060000000 HC ICU INTERMEDIATE R&B

## 2020-09-17 PROCEDURE — 85027 COMPLETE CBC AUTOMATED: CPT

## 2020-09-17 PROCEDURE — 87081 CULTURE SCREEN ONLY: CPT

## 2020-09-17 PROCEDURE — C1894 INTRO/SHEATH, NON-LASER: HCPCS

## 2020-09-17 PROCEDURE — 36415 COLL VENOUS BLD VENIPUNCTURE: CPT

## 2020-09-17 PROCEDURE — 99153 MOD SED SAME PHYS/QHP EA: CPT

## 2020-09-17 PROCEDURE — 99255 IP/OBS CONSLTJ NEW/EST HI 80: CPT | Performed by: THORACIC SURGERY (CARDIOTHORACIC VASCULAR SURGERY)

## 2020-09-17 PROCEDURE — 6370000000 HC RX 637 (ALT 250 FOR IP): Performed by: STUDENT IN AN ORGANIZED HEALTH CARE EDUCATION/TRAINING PROGRAM

## 2020-09-17 PROCEDURE — 94150 VITAL CAPACITY TEST: CPT

## 2020-09-17 PROCEDURE — 93880 EXTRACRANIAL BILAT STUDY: CPT

## 2020-09-17 PROCEDURE — 2580000003 HC RX 258: Performed by: INTERNAL MEDICINE

## 2020-09-17 PROCEDURE — C1760 CLOSURE DEV, VASC: HCPCS

## 2020-09-17 PROCEDURE — 4A023N7 MEASUREMENT OF CARDIAC SAMPLING AND PRESSURE, LEFT HEART, PERCUTANEOUS APPROACH: ICD-10-PCS | Performed by: INTERNAL MEDICINE

## 2020-09-17 PROCEDURE — B2111ZZ FLUOROSCOPY OF MULTIPLE CORONARY ARTERIES USING LOW OSMOLAR CONTRAST: ICD-10-PCS | Performed by: INTERNAL MEDICINE

## 2020-09-17 PROCEDURE — 81001 URINALYSIS AUTO W/SCOPE: CPT

## 2020-09-17 PROCEDURE — B2151ZZ FLUOROSCOPY OF LEFT HEART USING LOW OSMOLAR CONTRAST: ICD-10-PCS | Performed by: INTERNAL MEDICINE

## 2020-09-17 PROCEDURE — 6360000002 HC RX W HCPCS

## 2020-09-17 PROCEDURE — 2500000003 HC RX 250 WO HCPCS

## 2020-09-17 PROCEDURE — 93970 EXTREMITY STUDY: CPT

## 2020-09-17 RX ORDER — TRAMADOL HYDROCHLORIDE 50 MG/1
50 TABLET ORAL 2 TIMES DAILY PRN
Status: DISCONTINUED | OUTPATIENT
Start: 2020-09-17 | End: 2020-09-22

## 2020-09-17 RX ORDER — ACETAMINOPHEN 325 MG/1
650 TABLET ORAL EVERY 4 HOURS PRN
Status: DISCONTINUED | OUTPATIENT
Start: 2020-09-17 | End: 2020-09-17 | Stop reason: SDUPTHER

## 2020-09-17 RX ORDER — SODIUM CHLORIDE 9 MG/ML
INJECTION, SOLUTION INTRAVENOUS CONTINUOUS
Status: ACTIVE | OUTPATIENT
Start: 2020-09-17 | End: 2020-09-17

## 2020-09-17 RX ADMIN — ASPIRIN 81 MG: 81 TABLET, CHEWABLE ORAL at 08:16

## 2020-09-17 RX ADMIN — TRAMADOL HYDROCHLORIDE 50 MG: 50 TABLET, FILM COATED ORAL at 15:17

## 2020-09-17 RX ADMIN — AMLODIPINE BESYLATE 5 MG: 5 TABLET ORAL at 08:16

## 2020-09-17 RX ADMIN — HEPARIN SODIUM 14 UNITS/KG/HR: 10000 INJECTION, SOLUTION INTRAVENOUS at 15:51

## 2020-09-17 RX ADMIN — METOPROLOL TARTRATE 12.5 MG: 25 TABLET, FILM COATED ORAL at 20:53

## 2020-09-17 RX ADMIN — METOPROLOL TARTRATE 12.5 MG: 25 TABLET, FILM COATED ORAL at 10:20

## 2020-09-17 RX ADMIN — CLOPIDOGREL BISULFATE 75 MG: 75 TABLET ORAL at 08:16

## 2020-09-17 RX ADMIN — IOHEXOL 100 ML: 350 INJECTION, SOLUTION INTRAVENOUS at 09:18

## 2020-09-17 RX ADMIN — ATORVASTATIN CALCIUM 80 MG: 40 TABLET, FILM COATED ORAL at 20:53

## 2020-09-17 RX ADMIN — Medication 10 ML: at 20:53

## 2020-09-17 RX ADMIN — HEPARIN SODIUM 14 UNITS/KG/HR: 10000 INJECTION, SOLUTION INTRAVENOUS at 06:26

## 2020-09-17 RX ADMIN — Medication 10 ML: at 08:17

## 2020-09-17 RX ADMIN — SODIUM CHLORIDE: 9 INJECTION, SOLUTION INTRAVENOUS at 13:30

## 2020-09-17 ASSESSMENT — PAIN SCALES - GENERAL
PAINLEVEL_OUTOF10: 6
PAINLEVEL_OUTOF10: 2
PAINLEVEL_OUTOF10: 0
PAINLEVEL_OUTOF10: 0

## 2020-09-17 ASSESSMENT — ENCOUNTER SYMPTOMS
SHORTNESS OF BREATH: 0
CHEST TIGHTNESS: 0
COUGH: 0
ABDOMINAL PAIN: 0

## 2020-09-17 NOTE — CONSULTS
Procedure:  LHC, angioseal RFA  Complication: none   EBL < 5cc  Preliminary:  LV overall preserved EF 55%, lateral wall motion abnormality. LM ok. LAD 70-80% mid, serial 80% distal/apical lesions. Cx with lg OM with long 95% stenosis,  RCA dom 90% distal bend.       Rec CVTS consult  Successful angioseal RFA

## 2020-09-17 NOTE — PROGRESS NOTES
[address] Daily Progress Note      Admit Date:  9/16/2020    Subjective:  Ms. Kern Morning was seen and examined. F/U non st mi. Had good night. No further chest pain. Denies sob. Objective:   /69   Pulse 68   Temp 98.5 °F (36.9 °C) (Oral)   Resp 16   Wt 158 lb 1.1 oz (71.7 kg)   SpO2 99%   BMI 28.91 kg/m²       Intake/Output Summary (Last 24 hours) at 9/17/2020 0826  Last data filed at 9/17/2020 9082  Gross per 24 hour   Intake 120 ml   Output 450 ml   Net -330 ml       TELEMETRY: Sinus     Physical Exam:  General:  Awake, alert, NAD  Skin:  Warm and dry  Neck:  JVP not elevated  Chest:  Clear to auscultation, respiration normal  Cardiovascular:  RRR S1S2  Abdomen:  Soft nontender  Extremities:  no edema    Medications:    amLODIPine  5 mg Oral Daily    sodium chloride flush  10 mL Intravenous 2 times per day    atorvastatin  80 mg Oral Nightly    aspirin  81 mg Oral Daily    insulin lispro  0-6 Units Subcutaneous TID WC    insulin lispro  0-3 Units Subcutaneous Nightly    metoprolol tartrate  12.5 mg Oral BID    clopidogrel  75 mg Oral Daily      heparin (porcine) 14 Units/kg/hr (09/17/20 0626)    dextrose       nitroGLYCERIN, sodium chloride flush, acetaminophen **OR** acetaminophen, polyethylene glycol, promethazine **OR** ondansetron, heparin (porcine), heparin (porcine), glucose, dextrose, glucagon (rDNA), dextrose    Lab Data:  CBC:   Recent Labs     09/16/20  0024 09/17/20  0651   WBC 9.7 7.6   HGB 14.4 14.4   HCT 42.7 44.1   MCV 83.4 84.4    286     BMP:   Recent Labs     09/16/20  0024 09/16/20  0506 09/17/20  0651   * 136 138   K 3.8 4.4 5.0   CL 97* 100 104   CO2 25 28 25   BUN 21* 20 18   CREATININE 0.8 0.6 0.6     LIVER PROFILE: No results for input(s): AST, ALT, LIPASE, BILIDIR, BILITOT, ALKPHOS in the last 72 hours. Invalid input(s):   AMYLASE,  ALB  PT/INR:   Recent Labs     09/16/20  0024   PROTIME 12.0   INR 1.03     APTT:   Recent Labs 09/16/20  0024   APTT 28.1     BNP:  No results for input(s): BNP in the last 72 hours. IMAGING:     Assessment:  Patient Active Problem List    Diagnosis Date Noted    NSTEMI (non-ST elevated myocardial infarction) ([facility] [address] 09/16/2020    Chest pain 02/27/2020    Uncontrolled hypertension 08/29/2018    Chronic eczematous otitis externa of both ears 08/29/2018    Cardiac murmur 08/29/2018    Chronic pain syndrome 08/29/2018    Chronic neck pain 08/29/2018       Plan:  1. Had good night. No further chest pain. Covid rapid negative. Cath this am. Risk and bene explained. She is agreeable.        Core Measures:  · Discharge instructions:   · LVEF documented:   · ACEI for LV dysfunction:   · Smoking Cessation:    India Anthony MD 9/17/2020 8:26 AM

## 2020-09-17 NOTE — CARE COORDINATION
CM spoke with patient at bedside today. Pt is from home with significant other, independent pta. No CM needs at this time. CM to follow as pt will be having surgery, possibly Tuesday.      Barby Live RN, BSN,   4th Floor Progressive Care Unit  [phone]

## 2020-09-17 NOTE — CONSULTS
Consultation History & Physical    Patient ID: Stefan Stein  MRN:  [id number]  YOB: 1960      Date of Admission:  9/16/2020 12:06 AM  Date of Consultation:  9/17/2020    Cardiologist: Dora Arechiga  PCP:  No primary care provider on file. Chief Complaint:  Chest pain    History of Present Illness: We are asked to see this patient in consultation by Aaron Mc with regard to coronary surgical revascularization. Stefan Stein is a 63 y.o.  female who was admitted through the Emergency Department with acute onset of chest pain. Patient stated pain was in the upper right and mid chest initially, 7 out of 10 intensity, felt like \"somebody sitting\" on her chest, radiated to her left shoulder, and accompanied by nausea. Patient denied any alleviating or aggravating factors for the pain. She denies taking nitroglycerin or aspirin or any medications for pain. She stated that she has had 4 such episodes of pain in the last week and a half, and those episodes typically subsided with Tums. Patient stated that she took Tums the day of admission but that did not help provide relief from the pain. The pain persistented which prompted her to present to the ED. She had episode like this in February. Stress test at that time was normal.      Past Medical History:   Diagnosis Date    Arthritis     Hypertension    Hallux valgus left foot  [place]  Osteoarthritis left knee    Past Surgical History:   Procedure Laterality Date    CARPAL TUNNEL RELEASE  6/25/2013    right  hand    KNEE SURGERY  2009    right knee  replacement   s/p tonsillectomy    Home Medications:   Prior to Admission medications    Medication Sig Start Date End Date Taking?  Authorizing Provider   amLODIPine (NORVASC) 5 MG tablet Take 5 mg by mouth daily  2/25/20  Yes Historical Provider, MD   Multiple Vitamins-Minerals (THERAPEUTIC MULTIVITAMIN-MINERALS) tablet Take 1 tablet by mouth daily   Yes Historical Provider, organization: Not on file     Attends meetings of clubs or organizations: Not on file     Relationship status: Not on file    Intimate partner violence     Fear of current or ex partner: Not on file     Emotionally abused: Not on file     Physically abused: Not on file     Forced sexual activity: Not on file   Other Topics Concern    Not on file   Social History Narrative    Not on file      OCCUPATION:        Family History        Problem Relation Age of Onset    Heart Disease Mother     Kidney Disease Mother     Heart Disease Father     Heart Attack Father      CAD - father, brother  Stroke - mother  Diabetes - brother, father     Review of Systems:  Reviewed, negative unless noted  Constitutional: weight change, weakness, impairment of ADLs  Eyes: eyestrain, redness, discharges  ENMT: post nasal drip, sinus pain, discharge   Cardiovascular: faintness, vertigo, color changes in fingers/toes  Respiratory: cough, sputum, hemoptysis  GI: excessive thirst, changes in bowel habits, abdominal swelling  : painful urination, pyuria, incontinence  Musculoskeletal: cramps, swelling, limitation of motor activity  Integumentary: cyanosis, changes in skin, dryness  Neurological: paralysis, tingling, tremors  Psychiatric: restlessness, irritability, mood swings  Endocrine: heat/cold intolerance, excessive sweating, hair loss  Hematologic/lymphatic: swollen glands, anemia, easy bruising/bleeding    Physical Examination:    BP (!) 142/77   Pulse 68   Temp 98.5 °F (36.9 °C) (Oral)   Resp 16   Ht 5' 7\" (1.702 m)   Wt 158 lb 1.1 oz (71.7 kg)   SpO2 99%   BMI 24.76 kg/m²  Room Air     Hand Dominance: right handed  Admission Weight: 159 lb (72.1 kg)  Body mass index is 24.76 kg/m². General appearance: NAD, well nourished   Eyes: anicteric, PERRLA  ENMT: no scars or lesions, no nasal deformity, normal dentition, no cyanosis of oral mucosa  Neck:   no masses, no thyroid enlargement, no JVD.   Respiratory: effort is unlabored, symmetric, no crackles, wheezes or rubs. No palpable/percussable abnormalities. Cardiovascular: regular, no murmur. No carotid bruits. No edema or varicosities. Abdominal aorta seems normal without bruit. Pulses:    carotid brachial radial femoral popliteal DP PT   LEFT   2   2 1   RIGHT   2   2 1     GI: abdomen soft, nondistended, no organomegaly. Musculoskeletal: Back is straight and non-tender, full ROM of upper and lower extremities. Extremities: Warm, pink, no clubbing, cyanosis, petechiae, ischemia, or deformities   Skin: no dermatitis or ulceration   Neuro: CN grossly intact. Sensation and motor function grossly intact. Psychiatric: oriented, appropriate mood/affect. MEDICAL DECISION MAKING/TESTING  Studies personally reviewed. Cath 9/17/2020:  LV overall preserved EF 55%, lateral wall motion abnormality. LM ok. LAD 70-80% mid, serial 80% distal/apical lesions. Cx with lg OM with long 95% stenosis,  RCA dom 90% distal bend. Echo 2/18/2020:    Left ventricular cavity size is normal. There is asymmetric hypertrophy of   the septum. Overall left ventricular systolic function appears normal with   an estimated ejection fraction 55-60%. No regional wall motion abnormalities   are noted. Diastolic filling parameters suggests normal diastolic function. Mild mitral regurgitation is present. Estimated pulmonary artery systolic   pressure is at 23 mmHg assuming a right atrial pressure of 3 mmHg. Chest Xray (portable) 9/16/2020: No acute disease    EKG 9/16/2020:   NSR     Nuclear Stress 2/28/2020:  Overall findings represent a low risk scan.     There is normal isotope uptake at stress and rest. There is no evidence of     myocardial ischemia or scar.     Normal LV size and systolic function.     Normal myocardial perfusion study.  ECG: Non-diagnostic EKG response due to failure to reach target heart rate.       Labs:    CBC:   Recent Labs     09/16/20  0024 09/17/20  0651   WBC 9.7 7.6   HGB 14.4 14.4   HCT 42.7 44.1   MCV 83.4 84.4    286     BMP:   Recent Labs     09/16/20  0024 09/16/20  0506 09/17/20  0651   * 136 138   K 3.8 4.4 5.0   CL 97* 100 104   CO2 25 28 25   BUN 21* 20 18   CREATININE 0.8 0.6 0.6   MG  --  2.00 2.20     Lab Results   Component Value Date    LABA1C 6.2 09/15/2020     Accucheck Glucoses:   Recent Labs     09/16/20  0809 09/16/20  1801 09/16/20  2326 09/17/20  0941   POCGLU 118* 151* 112* 120*     Cardiac Enzymes:   Recent Labs     09/16/20  2111   TROPONINI 1.99*     Recent Labs     09/16/20  0206 09/16/20  0506 09/16/20  0902 09/16/20  1652 09/16/20  2111   TROPONINI 0.16* 0.73* 2.03* 2.19* 1.99*     PT/INR:   Recent Labs     09/16/20  0024   PROTIME 12.0   INR 1.03     APTT:   Recent Labs     09/16/20  0024   APTT 28.1     UA:   Lab Results   Component Value Date    COLORU Yellow 08/04/2015    PHUR 5.0 08/04/2015    WBCUA 3-5 08/04/2015    RBCUA 3-5 08/04/2015    YEAST Present 08/04/2015    BACTERIA Rare 08/04/2015    CLARITYU Clear 08/04/2015    SPECGRAV 1.010 08/04/2015    LEUKOCYTESUR TRACE 08/04/2015    UROBILINOGEN 0.2 08/04/2015    BILIRUBINUR Negative 08/04/2015    BLOODU SMALL 08/04/2015    GLUCOSEU Negative 08/04/2015       Diagnostic testing ordered: yes    History obtained: EMR, patient, Significant other Luis Costello     Risk Factors:  MI, smoker, positive family history    Risk factor modification reviewed: yes    STS Cardiac Surgery Risk profile:     CABG Only  Mortality:  0.564%  Morbidity or Mortality:  5.805%  Long LOS:  1.728%  Short LOS:  61.985%  Permanent Stroke:  0.856%  Prolonged Ventilation:  3.638%  Deep Sternal Infection:  0.113%  Renal Failure:  0.264%  Reoperation:  1.975%    [id number] Score for Atrial Fibrillation Stroke Risk:   Risk   Factors  Component Value   C CHF No 0   H HTN Yes 1   A2 Age >= 75 No,  (63 y.o.) 0   D DM No 0   S2 Prior Stroke/TIA No 0   V Vascular Disease Yes 1   A Age 74-69 No,  (61 y.o.) 0 Sc Sex female 1    [id number]  Score  3     Diagnosis:   Patient Active Problem List   Diagnosis    Uncontrolled hypertension    Chronic eczematous otitis externa of both ears    Cardiac murmur    Chronic pain syndrome    Chronic neck pain    Chest pain    NSTEMI (non-ST elevated myocardial infarction) [facility])        Plan:   63year-old female with NSTEMI, elevated tropinins  - I have personally reviewed the left heart catheterization : she will need likely 4 vessel surgical myocardial revascularization  -echocardiogram with preserved LVEF  -Ms. Severa Petite consumed PLAVIX yesterday 9/17. Will await 5 days of plavix washout prior to surgical intervention, tentatively planned for Tuesday 9/22/2020. Plavi has now been discontinued.  -Will also require NATALIE clippage for stroke-risk reduction, given CHADsVASC score. Risks, benefits, and alternatives explained to patient. Typical periop/postop course reviewed including initial limitations on driving/heavy lifting.enefits and postoperative complications discussed including bleeding with need for reoperation and/or transfusions, infection, myocardial infarction, graft failure, stroke, multi-organ failures, prolonged intubation, death.  Patient has expressed understanding of risks of Sheri Paredes MD  Cardiothoracic Surgery

## 2020-09-17 NOTE — PROGRESS NOTES
Incentive Spirometry education and demonstration completed by Respiratory Therapy. Turning over to Nursing for routine follow-up. Minimum Predicted Vital Capacity - 980 mL. Patient's Actual Vital Capacity - 2500 mL.

## 2020-09-17 NOTE — PROCEDURES
[facility] [address], [address]                            CARDIAC CATHETERIZATION    PATIENT NAME: Tejal Herrera                   :        1960  MED REC NO:   [id number]                          ROOM:       4454  ACCOUNT NO:   [de-identified]                           ADMIT DATE: 2020  PROVIDER:     Gold Iraheta MD    DATE OF PROCEDURE:  2020    PROCEDURES:  Left heart catheterization, coronary cineangiography,  Angio-Seal of the right femoral arteriotomy site. HISTORY:  The patient is a 70-year-old female with a history of  hypertension who presents with complaints of chest discomfort. Apparently, she has been having intermittent heartburn over the past  week. This seemed to improve with Tums. However, the evening of  admission had the onset of right substernal chest discomfort with  radiation to her left shoulder. It was 8/10 in severity. It was  relieved by nitroglycerin in the emergency room. She did, however, have  an elevation of her troponin. She was ruled out for COVID. She has  been symptom free essentially since admission. Because of her non-ST  elevation myocardial infarction, however, it was felt she should undergo  catheterization. TECHNICAL PROCEDURE:  The patient was brought to the cardiac  catheterization lab on 2020, where the right femoral area was  prepped and draped in the usual sterile fashion. After anesthetizing  the area with 2% lidocaine, a 5-Persian sheath was placed in the right  femoral artery using Seldinger technique. Subsequently, left heart  catheterization, left ventriculography, and coronary cineangiography of  both left and right coronaries were performed in multiple projections. This was performed using 5-Persian pigtail, JL-4 and JR-4 diagnostic  catheters. The patient tolerated the procedure well. No complications  were encountered.   A brief right

## 2020-09-17 NOTE — PROGRESS NOTES
Progress Note    Admit Date: 9/16/2020  Day: 1  Diet: Diet NPO Effective Now    CC: Chest pain    Interval history: Patient seen at bedside this morning. Denied chest pain, shortness of breath, abdominal pain. States she slept well with no complaints. HPI: 63 y.o. female with a PMH HTN presenting with chest pain. Patient notes she started having chest pain while resting at approx 10:15 this evening, started mid to right sternum and radiating to her left shoulder. Pain is 8/10 in intensity and feels as though she had something sitting on her chest. Also had associated diaphoresis, nausea, and SOB. Pain was alleviated with SL nitro. She has been having intermittent episodes of what she attributed to heartburn for the last week. She states the pain would happen at night and would improve with Tums. She does not take ASA. Current 1/2 PPD smoker for the last 30 years. Denies alcohol use or recreational drug use.  Father with history of CAD in his 46s.      Patient had low risk stress test 2/2020.   (from H&P on admission)    Medications:     Scheduled Meds:   amLODIPine  5 mg Oral Daily    sodium chloride flush  10 mL Intravenous 2 times per day    atorvastatin  80 mg Oral Nightly    aspirin  81 mg Oral Daily    insulin lispro  0-6 Units Subcutaneous TID WC    insulin lispro  0-3 Units Subcutaneous Nightly    metoprolol tartrate  12.5 mg Oral BID    clopidogrel  75 mg Oral Daily     Continuous Infusions:   heparin (porcine) Stopped (09/17/20 0840)    dextrose       PRN Meds:nitroGLYCERIN, sodium chloride flush, acetaminophen **OR** acetaminophen, polyethylene glycol, promethazine **OR** ondansetron, heparin (porcine), heparin (porcine), glucose, dextrose, glucagon (rDNA), dextrose    Objective:   Vitals:   T-max:  Patient Vitals for the past 8 hrs:   BP Temp Temp src Pulse Resp SpO2 Height Weight   09/17/20 0848 -- -- -- -- -- -- 5' 7\" (1.702 m) --   09/17/20 0813 125/69 98.5 °F (36.9 °C) Oral 68 16 99 % -- --   09/17/20 0625 121/76 98.2 °F (36.8 °C) Oral 67 17 97 % -- 158 lb 1.1 oz (71.7 kg)       Intake/Output Summary (Last 24 hours) at 9/17/2020 0909  Last data filed at 9/17/2020 4591  Gross per 24 hour   Intake 120 ml   Output 450 ml   Net -330 ml       Review of Systems   Constitutional: Negative for chills and fever. Respiratory: Negative for cough, chest tightness and shortness of breath. Cardiovascular: Negative for chest pain and leg swelling. Gastrointestinal: Negative for abdominal pain. Genitourinary: Negative for dysuria. Neurological: Negative for dizziness and light-headedness. Physical Exam  Vitals signs reviewed. Constitutional:       General: She is not in acute distress. Appearance: She is not diaphoretic. HENT:      Head: Normocephalic. Cardiovascular:      Rate and Rhythm: Normal rate and regular rhythm. Heart sounds: Murmur present. Pulmonary:      Effort: Pulmonary effort is normal. No respiratory distress. Breath sounds: No wheezing. Abdominal:      General: Bowel sounds are normal.      Tenderness: There is no guarding or rebound. Neurological:      General: No focal deficit present. Mental Status: She is alert and oriented to person, place, and time. LABS:    CBC:   Recent Labs     09/16/20  0024 09/17/20  0651   WBC 9.7 7.6   HGB 14.4 14.4   HCT 42.7 44.1    286   MCV 83.4 84.4     Renal:    Recent Labs     09/16/20  0024 09/16/20  0506 09/17/20  0651   * 136 138   K 3.8 4.4 5.0   CL 97* 100 104   CO2 25 28 25   BUN 21* 20 18   CREATININE 0.8 0.6 0.6   GLUCOSE 250* 147* 125*   CALCIUM 9.5 9.7 9.3   MG  --  2.00 2.20   ANIONGAP 11 8 9     Hepatic: No results for input(s): AST, ALT, BILITOT, BILIDIR, PROT, LABALBU, ALKPHOS in the last 72 hours. Troponin:   Recent Labs     09/16/20  0902 09/16/20  1652 09/16/20  2111   TROPONINI 2.03* 2.19* 1.99*     BNP: No results for input(s): BNP in the last 72 hours.   Lipids: No results for input(s): CHOL, HDL in the last 72 hours. Invalid input(s): LDLCALCU, TRIGLYCERIDE  ABGs:  No results for input(s): PHART, FCF8PQC, PO2ART, CQL0TBA, BEART, THGBART, H7NVKUID, NSK2GUL in the last 72 hours. INR:   Recent Labs     09/16/20  0024   INR 1.03     Lactate: No results for input(s): LACTATE in the last 72 hours. Cultures:  -----------------------------------------------------------------  RAD:   XR CHEST PORTABLE   Final Result     No evidence of acute cardiopulmonary disease. Assessment/Plan:     NSTEMI   Patient presenting with typical chest pain, since resolved. EKG with t-wave inversion V1, ST depression V2 and V3. Repeat EKG NSR. KAPIL score 3. Troponin trend 0.73-2.03-2.19-1.99  - cardiology following- cath this AM  - NPO  - continue heparin gtt  - continue ASA/statin  - SL nitro PRN  - monitor on telemetry   - continue home cozaar   - lipid panel pending     Hyperglycemia  Glucose 250 on renal panel.  No prior history of diabetes  - A1C=6.2  - hypoglycemia protocol  - accuchecks AC/HS  - LDSSI     HTN  - continue home norvasc and cozaar    Code Status: full  FEN: npo  PPX: heparin  DISPO: ip    Mojgan Triana DO, PGY-1  09/17/20  9:09 AM    This patient has been staffed and discussed with Hannah Tolentino MD.

## 2020-09-17 NOTE — PLAN OF CARE
Problem: Pain:  Goal: Pain level will decrease  Outcome: Ongoing  Patient has not had any pain complaints this shift. Problem: Cardiac Output - Decreased:  Goal: Hemodynamic stability will improve  Outcome: Ongoing   Patient was admitted with NSTEMI. Trending Troponin; 0.73-2.03-2.19-1.99. Patient is on Heparin drip. Patient is to have angio 9/17, with Dr. Octavia Hinds. Patient has been hemodynamically stable with stable vital signs and labs. Patients heart rate is; 60's; NSR, on continuous telemetry monitoring. Patient denies any chest pain or SOB at this time. Will continue to monitor. Problem: Bleeding:  Goal: Will show no signs and symptoms of excessive bleeding  Outcome: Ongoing   Patient is on Heparin drip; 14units/kg/hr, 10.1mL/hr. Patient is therapeutic on heparin drip. Patient has not shown any signs or symptoms of bleeding. Will continue to monitor.

## 2020-09-17 NOTE — CONSULTS
(NORVASC) tablet 5 mg  5 mg Oral Daily Sadiq Parkinson MD   5 mg at 09/17/20 0816    sodium chloride flush 0.9 % injection 10 mL  10 mL Intravenous 2 times per day Sadiq Parkinson MD   10 mL at 09/17/20 0817    sodium chloride flush 0.9 % injection 10 mL  10 mL Intravenous PRN Sadiq Parkinson MD        acetaminophen (TYLENOL) tablet 650 mg  650 mg Oral Q6H PRN Sadiq Parkinson MD        Or   Hiro Loser acetaminophen (TYLENOL) suppository 650 mg  650 mg Rectal Q6H PRN Sadiq Parkinson MD        polyethylene glycol (GLYCOLAX) packet 17 g  17 g Oral Daily PRN Sadiq Parkinson MD        promethazine (PHENERGAN) tablet 12.5 mg  12.5 mg Oral Q6H PRN Sadiq Parkinson MD        Or    ondansetron [facility]) injection 4 mg  4 mg Intravenous Q6H PRN Sadiq Parkinson MD        atorvastatin (LIPITOR) tablet 80 mg  80 mg Oral Nightly Sadiq Parkinson MD   80 mg at 09/16/20 2050    aspirin chewable tablet 81 mg  81 mg Oral Daily Sadiq Parkinson MD   81 mg at 09/17/20 0816    heparin (porcine) injection 4,000 Units  4,000 Units Intravenous PRN Sadiq Parkinson MD        heparin (porcine) injection 2,000 Units  2,000 Units Intravenous PRN Sadiq Parkinson MD   2,000 Units at 09/16/20 1110    heparin 25,000 units in dextrose 5% 250 mL infusion  14 Units/kg/hr Intravenous Continuous Felipe Dubois MD   Stopped at 09/17/20 0840    glucose (GLUTOSE) 40 % oral gel 15 g  15 g Oral PRN Sadiq Parkinson MD        dextrose 50 % IV solution  12.5 g Intravenous PRN Sadiq Parkinson MD        glucagon (rDNA) injection 1 mg  1 mg Intramuscular PRN Sadiq Parkinson MD        dextrose 5 % solution  100 mL/hr Intravenous PRN Sadiq Parkinson MD        insulin lispro (1 Unit Dial) 0-6 Units  0-6 Units Subcutaneous TID WC Sadiq Parkinson MD        insulin lispro (1 Unit Dial) 0-3 Units  0-3 Units Subcutaneous Nightly Sadiq Parkinson MD   Stopped at 09/16/20 2327    metoprolol tartrate (LOPRESSOR) tablet 12.5 mg  12.5 mg Oral BID Hussein Traylor MD

## 2020-09-17 NOTE — PROGRESS NOTES
Cath consent obtained and placed on patients chart.      Electronically signed by Vinny Goldberg RN on 9/16/2020 at 8:56 PM

## 2020-09-18 ENCOUNTER — APPOINTMENT (OUTPATIENT)
Dept: CT IMAGING | Age: 60
DRG: 234 | End: 2020-09-18
Payer: COMMERCIAL

## 2020-09-18 LAB
ALBUMIN SERPL-MCNC: 3.6 G/DL (ref 3.4–5)
ALP BLD-CCNC: 82 U/L (ref 40–129)
ALT SERPL-CCNC: 28 U/L (ref 10–40)
ANION GAP SERPL CALCULATED.3IONS-SCNC: 7 MMOL/L (ref 3–16)
ANTI-XA UNFRAC HEPARIN: 0.44 IU/ML (ref 0.3–0.7)
AST SERPL-CCNC: 39 U/L (ref 15–37)
BILIRUB SERPL-MCNC: 0.3 MG/DL (ref 0–1)
BILIRUBIN DIRECT: <0.2 MG/DL (ref 0–0.3)
BILIRUBIN, INDIRECT: ABNORMAL MG/DL (ref 0–1)
BUN BLDV-MCNC: 19 MG/DL (ref 7–20)
CALCIUM SERPL-MCNC: 9.1 MG/DL (ref 8.3–10.6)
CHLORIDE BLD-SCNC: 105 MMOL/L (ref 99–110)
CO2: 25 MMOL/L (ref 21–32)
CREAT SERPL-MCNC: 0.6 MG/DL (ref 0.6–1.2)
FIBRINOGEN: 329 MG/DL (ref 200–397)
GFR AFRICAN AMERICAN: >60
GFR NON-AFRICAN AMERICAN: >60
GLUCOSE BLD-MCNC: 113 MG/DL (ref 70–99)
GLUCOSE BLD-MCNC: 118 MG/DL (ref 70–99)
GLUCOSE BLD-MCNC: 120 MG/DL (ref 70–99)
GLUCOSE BLD-MCNC: 99 MG/DL (ref 70–99)
HCT VFR BLD CALC: 40.1 % (ref 36–48)
HEMOGLOBIN: 13 G/DL (ref 12–16)
MAGNESIUM: 2 MG/DL (ref 1.8–2.4)
MCH RBC QN AUTO: 27.8 PG (ref 26–34)
MCHC RBC AUTO-ENTMCNC: 32.5 G/DL (ref 31–36)
MCV RBC AUTO: 85.3 FL (ref 80–100)
PDW BLD-RTO: 13.7 % (ref 12.4–15.4)
PERFORMED ON: ABNORMAL
PERFORMED ON: ABNORMAL
PERFORMED ON: NORMAL
PLATELET # BLD: 240 K/UL (ref 135–450)
PMV BLD AUTO: 8.2 FL (ref 5–10.5)
POTASSIUM SERPL-SCNC: 4.8 MMOL/L (ref 3.5–5.1)
RBC # BLD: 4.7 M/UL (ref 4–5.2)
SODIUM BLD-SCNC: 137 MMOL/L (ref 136–145)
TOTAL PROTEIN: 5.9 G/DL (ref 6.4–8.2)
URINE CULTURE, ROUTINE: NORMAL
WBC # BLD: 7.4 K/UL (ref 4–11)

## 2020-09-18 PROCEDURE — 80048 BASIC METABOLIC PNL TOTAL CA: CPT

## 2020-09-18 PROCEDURE — 71275 CT ANGIOGRAPHY CHEST: CPT

## 2020-09-18 PROCEDURE — 84443 ASSAY THYROID STIM HORMONE: CPT

## 2020-09-18 PROCEDURE — 6370000000 HC RX 637 (ALT 250 FOR IP): Performed by: INTERNAL MEDICINE

## 2020-09-18 PROCEDURE — 6370000000 HC RX 637 (ALT 250 FOR IP): Performed by: STUDENT IN AN ORGANIZED HEALTH CARE EDUCATION/TRAINING PROGRAM

## 2020-09-18 PROCEDURE — 6360000004 HC RX CONTRAST MEDICATION: Performed by: CLINICAL NURSE SPECIALIST

## 2020-09-18 PROCEDURE — 82306 VITAMIN D 25 HYDROXY: CPT

## 2020-09-18 PROCEDURE — 85520 HEPARIN ASSAY: CPT

## 2020-09-18 PROCEDURE — 83735 ASSAY OF MAGNESIUM: CPT

## 2020-09-18 PROCEDURE — 83036 HEMOGLOBIN GLYCOSYLATED A1C: CPT

## 2020-09-18 PROCEDURE — 80061 LIPID PANEL: CPT

## 2020-09-18 PROCEDURE — 36415 COLL VENOUS BLD VENIPUNCTURE: CPT

## 2020-09-18 PROCEDURE — 6360000002 HC RX W HCPCS: Performed by: INTERNAL MEDICINE

## 2020-09-18 PROCEDURE — 85027 COMPLETE CBC AUTOMATED: CPT

## 2020-09-18 PROCEDURE — 80076 HEPATIC FUNCTION PANEL: CPT

## 2020-09-18 PROCEDURE — 99233 SBSQ HOSP IP/OBS HIGH 50: CPT | Performed by: NURSE PRACTITIONER

## 2020-09-18 PROCEDURE — 85384 FIBRINOGEN ACTIVITY: CPT

## 2020-09-18 PROCEDURE — 2580000003 HC RX 258: Performed by: STUDENT IN AN ORGANIZED HEALTH CARE EDUCATION/TRAINING PROGRAM

## 2020-09-18 PROCEDURE — 2060000000 HC ICU INTERMEDIATE R&B

## 2020-09-18 RX ADMIN — ATORVASTATIN CALCIUM 80 MG: 40 TABLET, FILM COATED ORAL at 20:12

## 2020-09-18 RX ADMIN — IOPAMIDOL 80 ML: 755 INJECTION, SOLUTION INTRAVENOUS at 10:56

## 2020-09-18 RX ADMIN — AMLODIPINE BESYLATE 5 MG: 5 TABLET ORAL at 10:09

## 2020-09-18 RX ADMIN — ASPIRIN 81 MG: 81 TABLET, CHEWABLE ORAL at 10:09

## 2020-09-18 RX ADMIN — TRAMADOL HYDROCHLORIDE 50 MG: 50 TABLET, FILM COATED ORAL at 15:49

## 2020-09-18 RX ADMIN — METOPROLOL TARTRATE 12.5 MG: 25 TABLET, FILM COATED ORAL at 10:09

## 2020-09-18 RX ADMIN — HEPARIN SODIUM 14 UNITS/KG/HR: 10000 INJECTION, SOLUTION INTRAVENOUS at 15:50

## 2020-09-18 RX ADMIN — METOPROLOL TARTRATE 12.5 MG: 25 TABLET, FILM COATED ORAL at 20:12

## 2020-09-18 RX ADMIN — Medication 10 ML: at 10:10

## 2020-09-18 ASSESSMENT — ENCOUNTER SYMPTOMS
SHORTNESS OF BREATH: 0
COUGH: 0
ABDOMINAL PAIN: 0
CHEST TIGHTNESS: 0

## 2020-09-18 ASSESSMENT — PAIN SCALES - GENERAL
PAINLEVEL_OUTOF10: 0
PAINLEVEL_OUTOF10: 0
PAINLEVEL_OUTOF10: 6
PAINLEVEL_OUTOF10: 0
PAINLEVEL_OUTOF10: 6

## 2020-09-18 ASSESSMENT — PAIN DESCRIPTION - PAIN TYPE: TYPE: CHRONIC PAIN

## 2020-09-18 ASSESSMENT — PAIN DESCRIPTION - DESCRIPTORS: DESCRIPTORS: ACHING

## 2020-09-18 ASSESSMENT — PAIN DESCRIPTION - LOCATION: LOCATION: NECK

## 2020-09-18 ASSESSMENT — PAIN DESCRIPTION - ORIENTATION: ORIENTATION: LOWER

## 2020-09-18 NOTE — PLAN OF CARE
Problem: Pain:  Goal: Pain level will decrease  Description: Pain level will decrease  Outcome: Ongoing   PRN medication administered for 6/10 pain. Problem: HEMODYNAMIC STATUS  Goal: Patient has stable vital signs and fluid balance  Outcome: Ongoing     Problem: FLUID AND ELECTROLYTE IMBALANCE  Goal: Fluid and electrolyte balance are achieved/maintained  Outcome: Ongoing     Problem: OXYGENATION/RESPIRATORY FUNCTION  Goal: Patient will achieve/maintain normal respiratory rate/effort  Description: Respiratory rate and effort will be within normal limits for the patient  Outcome: Ongoing     Problem: Bleeding:  Goal: Will show no signs and symptoms of excessive bleeding  Description: Will show no signs and symptoms of excessive bleeding  Outcome: Ongoing   Heparin gtt remains at 14 units/kg/hr.  No s/s of bleeding

## 2020-09-18 NOTE — PLAN OF CARE
Problem: Pain:  Goal: Pain level will decrease  Outcome: Ongoing  Patient has not had any pain complaints this shift. Problem: Cardiac Output - Decreased:  Goal: Hemodynamic stability will improve  Outcome: Ongoing   Patient was admitted with NSTEMI. Trending Troponin; 0.73-2.03-2.19-1.99. Patient is on Heparin drip. Patient had angio 9/17, with Dr. Mary Ghosh. Patient will have CABG next week. Patient has been hemodynamically stable with stable vital signs and labs. Patients heart rate is; 60's; NSR, on continuous telemetry monitoring. Patient denies any chest pain or SOB at this time. Will continue to monitor. Problem: Bleeding:  Goal: Will show no signs and symptoms of excessive bleeding  Outcome: Ongoing   Patient is on Heparin drip; 14units/kg/hr, 10.1mL/hr. Patient has not shown any signs or symptoms of bleeding. Will continue to monitor.

## 2020-09-18 NOTE — PROGRESS NOTES
[facility] [address]       In Patient  Progress note                      [facility]   Daily Progress Note      Admit Date:  9/16/2020  Primary Cardiologist : Denia Gonzalez     CC: Interval Hx:   was admitted with cp/ trop elevation /NSTEMI  with PMH of DMT2, HTN  Trop 0.16>0.73>2.03>2.19>1.99   sCr wnl mag wnl     Today VSS SV02 96% RA  afebrile / no c/o cp voiced   Cont Norvasc as lipitor lopressor hep gtt     LHC 9/17/20   1. Preserved LV function with lateral wall motion abnormality. 2.  Severe multivessel coronary artery disease as described above. 3.  Recommend surgical consultation. Echo 9/17/20  Mild concentric left ventricular hypertrophy. Normal left ventricular systolic function with an estimated ejection   fraction of 60%-65%. Normal diastolic function. Trivial mitral regurgitation. Aortic sclerosis - mild aortic stenosis with a peak velocity of 2.39m/s and   a mean pressure gradient of 13mmHg. Trivial tricuspid regurgitation. The pulmonic valve is not well visualized. Estimated pulmonary artery systolic pressure is at 26 mmHg assuming a right   atrial pressure of 3 mmHg. CT chest 9/18/20 no acute aortic abnormality    us carotid 9/17/20    The right internal carotid artery has a <50% diameter reducing stenosis     based on velocity criteria.     The right external carotid artery has a >50% diameter reduction based on     velocity criteria.     The left internal carotid artery has a 50-69% diameter reducing stenosis     based on velocity criteria.     The vertebral arteries demonstrate normal antegrade flow bilaterally. VL mapping 9/17/20   There is no evidence of superficial vein thrombosis involving the right great    and small saphenous veins. Please refer to the table below for diameter measurements.     Left    There is no evidence of superficial vein thrombosis involving the left great and small saphenous veins. Please refer to the table below for diameter measurements. There are no previous studies for comparison. I have examined pt and reviewed notes / any lab work EKGs stress test, angiograms, & images reviewed    Objective:   /81   Pulse 68   Temp 98 °F (36.7 °C) (Oral)   Resp 16   Ht 5' 7\" (1.702 m)   Wt 160 lb 7.9 oz (72.8 kg)   SpO2 96%   BMI 25.14 kg/m²       Intake/Output Summary (Last 24 hours) at 9/18/2020 1221  Last data filed at 9/18/2020 0805  Gross per 24 hour   Intake 870 ml   Output 850 ml   Net 20 ml     Wt Readings from Last 3 Encounters:   09/18/20 160 lb 7.9 oz (72.8 kg)   02/28/20 159 lb 13.3 oz (72.5 kg)   08/29/18 164 lb 3.2 oz (74.5 kg)       Physical Exam:   /81   Pulse 68   Temp 98 °F (36.7 °C) (Oral)   Resp 16   Ht 5' 7\" (1.702 m)   Wt 160 lb 7.9 oz (72.8 kg)   SpO2 96%   BMI 25.14 kg/m²   BP Readings from Last 3 Encounters:   09/18/20 120/81   02/28/20 (!) 179/91   08/29/18 (!) 142/100     Pulse Readings from Last 3 Encounters:   09/18/20 68   02/28/20 74   08/29/18 80       Intake/Output Summary (Last 24 hours) at 9/18/2020 1221  Last data filed at 9/18/2020 0805  Gross per 24 hour   Intake 870 ml   Output 850 ml   Net 20 ml     Wt Readings from Last 2 Encounters:   09/18/20 160 lb 7.9 oz (72.8 kg)   02/28/20 159 lb 13.3 oz (72.5 kg)     Constitutional:. In no acute distress.    Cardiovascular: Normal rate, regular rhythm  Medications:    amLODIPine  5 mg Oral Daily    sodium chloride flush  10 mL Intravenous 2 times per day    atorvastatin  80 mg Oral Nightly    aspirin  81 mg Oral Daily    insulin lispro  0-6 Units Subcutaneous TID WC    insulin lispro  0-3 Units Subcutaneous Nightly    metoprolol tartrate  12.5 mg Oral BID      heparin (porcine) 14 Units/kg/hr (09/17/20 1551)    dextrose       Recent Labs     09/16/20  0024 09/17/20  0651 09/18/20  0417   WBC 9.7 7.6 7.4   HGB 14.4 14.4 13.0    286 240 BMP:    Recent Labs     09/16/20  0506 09/17/20  0651 09/18/20  0417    138 137   K 4.4 5.0 4.8   CO2 28 25 25   BUN 20 18 19   CREATININE 0.6 0.6 0.6     LIVR:   Recent Labs     09/18/20  0417   AST 39*   ALT 28     INR:    Recent Labs     09/16/20  0024   INR 1.03     APTT:   Recent Labs     09/16/20  0024   APTT 28.1       Reviewed  available lab work,  EKGs, images, C       Assessment    Admitted with cp/ trop elevation / NSTEMI  Trop 0.16>0.73>2.03>2.19>1.99   Father with history of CAD in his 46s. C 9/17/20   1. Preserved LV function with lateral wall motion abnormality. 2.  Severe multivessel coronary artery disease as described above. 3.  Recommend surgical consultation. Echo 9/17/20  Mild concentric left ventricular hypertrophy. Normal left ventricular systolic function with an estimated ejection   fraction of 60%-65%. Normal diastolic function. Trivial mitral regurgitation. Aortic sclerosis - mild aortic stenosis with a peak velocity of 2.39m/s and   a mean pressure gradient of 13mmHg. Trivial tricuspid regurgitation. The pulmonic valve is not well visualized. Estimated pulmonary artery systolic pressure is at 26 mmHg assuming a right   atrial pressure of 3 mmHg. DMT2  Managed per IM     HTN  Cont norvasc lopressor   Optimal     Tobacco use  Cessation     Carotid stenosis   us carotid 9/17/20    The right internal carotid artery has a <50% diameter reducing stenosis     based on velocity criteria.     The right external carotid artery has a >50% diameter reduction based on     velocity criteria.     The left internal carotid artery has a 50-69% diameter reducing stenosis     based on velocity criteria.     The vertebral arteries demonstrate normal antegrade flow bilaterally.       Plan:  Do lipid profile / A1c  / TSH   Cont norvasc ass lipitor lopressor hep gtt   Pt had Plavix on 9/17/20 awaiting 5 days of plavix washout prior to surgical intervention  Per CTS note: CABG surgery and placement of LAAA clip tentatively on Tuesday with Dr Janel Slade  Will follow       Bryan Prudent APRN, [address]

## 2020-09-18 NOTE — PROGRESS NOTES
Progress Note    Admit Date: 9/16/2020  Day: 2  Diet: DIET CARDIAC;    CC: Chest pain    Interval history: Patient seen at bedside this morning. Denied chest pain, shortness of breath, abdominal pain. HPI: 63 y.o. female with a PMH HTN presenting with chest pain. Patient notes she started having chest pain while resting at approx 10:15 this evening, started mid to right sternum and radiating to her left shoulder. Pain is 8/10 in intensity and feels as though she had something sitting on her chest. Also had associated diaphoresis, nausea, and SOB. Pain was alleviated with SL nitro. She has been having intermittent episodes of what she attributed to heartburn for the last week. She states the pain would happen at night and would improve with Tums. She does not take ASA. Current 1/2 PPD smoker for the last 30 years. Denies alcohol use or recreational drug use.  Father with history of CAD in his 46s.      Patient had low risk stress test 2/2020.   (from H&P on admission)    Medications:     Scheduled Meds:   amLODIPine  5 mg Oral Daily    sodium chloride flush  10 mL Intravenous 2 times per day    atorvastatin  80 mg Oral Nightly    aspirin  81 mg Oral Daily    insulin lispro  0-6 Units Subcutaneous TID WC    insulin lispro  0-3 Units Subcutaneous Nightly    metoprolol tartrate  12.5 mg Oral BID     Continuous Infusions:   heparin (porcine) 14 Units/kg/hr (09/17/20 1551)    dextrose       PRN Meds:traMADol, nitroGLYCERIN, sodium chloride flush, acetaminophen **OR** acetaminophen, polyethylene glycol, promethazine **OR** ondansetron, heparin (porcine), heparin (porcine), glucose, dextrose, glucagon (rDNA), dextrose    Objective:   Vitals:   T-max:  Patient Vitals for the past 8 hrs:   BP Temp Temp src Pulse Resp SpO2 Weight   09/18/20 0805 120/81 98 °F (36.7 °C) Oral 68 16 96 % --   09/18/20 0646 -- -- -- -- -- -- 160 lb 7.9 oz (72.8 kg)   09/18/20 0418 120/72 98.1 °F (36.7 °C) Oral 66 18 98 % -- Intake/Output Summary (Last 24 hours) at 9/18/2020 0914  Last data filed at 9/18/2020 0805  Gross per 24 hour   Intake 1470 ml   Output 850 ml   Net 620 ml       Review of Systems   Constitutional: Negative for chills and fever. Respiratory: Negative for cough, chest tightness and shortness of breath. Cardiovascular: Negative for chest pain and leg swelling. Gastrointestinal: Negative for abdominal pain. Genitourinary: Negative for dysuria. Neurological: Negative for dizziness and light-headedness. Physical Exam  Vitals signs reviewed. Constitutional:       General: She is not in acute distress. Appearance: She is not diaphoretic. HENT:      Head: Normocephalic. Cardiovascular:      Rate and Rhythm: Normal rate and regular rhythm. Heart sounds: Murmur present. Pulmonary:      Effort: Pulmonary effort is normal. No respiratory distress. Breath sounds: No wheezing. Abdominal:      General: Bowel sounds are normal.      Tenderness: There is no guarding or rebound. Neurological:      General: No focal deficit present. Mental Status: She is alert and oriented to person, place, and time. LABS:    CBC:   Recent Labs     09/16/20  0024 09/17/20  0651 09/18/20  0417   WBC 9.7 7.6 7.4   HGB 14.4 14.4 13.0   HCT 42.7 44.1 40.1    286 240   MCV 83.4 84.4 85.3     Renal:    Recent Labs     09/16/20  0506 09/17/20  0651 09/18/20  0417    138 137   K 4.4 5.0 4.8    104 105   CO2 28 25 25   BUN 20 18 19   CREATININE 0.6 0.6 0.6   GLUCOSE 147* 125* 120*   CALCIUM 9.7 9.3 9.1   MG 2.00 2.20 2.00   ANIONGAP 8 9 7     Hepatic:   Recent Labs     09/18/20  0417   AST 39*   ALT 28   BILITOT 0.3   BILIDIR <0.2   PROT 5.9*   LABALBU 3.6   ALKPHOS 82     Troponin:   Recent Labs     09/16/20  0902 09/16/20  1652 09/16/20  2111   TROPONINI 2.03* 2.19* 1.99*     BNP: No results for input(s): BNP in the last 72 hours.   Lipids: No results for input(s): CHOL, HDL in the last 72 hours. Invalid input(s): LDLCALCU, TRIGLYCERIDE  ABGs:  No results for input(s): PHART, ITE3MDA, PO2ART, TCZ4NXS, BEART, THGBART, K5IKHOXQ, JZK5ZNK in the last 72 hours. INR:   Recent Labs     09/16/20  0024   INR 1.03     Lactate: No results for input(s): LACTATE in the last 72 hours. Cultures:  -----------------------------------------------------------------  RAD:   VL PRE OP VEIN MAPPING         VL DUP CAROTID BILATERAL   Final Result      XR CHEST PORTABLE   Final Result     No evidence of acute cardiopulmonary disease. CTA CHEST W WO CONTRAST    (Results Pending)       Assessment/Plan:     NSTEMI   Patient presenting with typical chest pain, since resolved. EKG with t-wave inversion V1, ST depression V2 and V3. Repeat EKG NSR. KAPIL score 3. Troponin trend 0.73-2.03-2.19-1.99. Echo 50-65% EF, normal diastolic function. - CABG Tuesday 9/22 tentatively  - CVTS following  - Chest CTA dt dilated ascending aorta pending  - continue heparin gtt  - continue ASA/statin  - SL nitro PRN  - monitor on telemetry   - continue home cozaar   - lipid panel pending     Hyperglycemia  Glucose 250 on renal panel.  No prior history of diabetes  - A1C=6.2  - hypoglycemia protocol  - accuchecks AC/HS  - LDSSI     HTN  - continue home norvasc and cozaar    Code Status: full  FEN: diet cardiac  PPX: heparin  DISPO: ip    Brayan Huffman DO, PGY-1  09/18/20  9:14 AM    This patient has been staffed and discussed with India Nj MD.

## 2020-09-18 NOTE — PROGRESS NOTES
CL 97* 100 104 105   CO2 25 28 25 25   GLUCOSE 250* 147* 125* 120*   BUN 21* 20 18 19   CREATININE 0.8 0.6 0.6 0.6   CALCIUM 9.5 9.7 9.3 9.1   MG  --  2.00 2.20 2.00     Accucheck Glucoses:   Recent Labs     09/17/20  0941 09/17/20  1226 09/17/20  1900 09/17/20  2354 09/18/20  0754   POCGLU 120* 112* 146* 138* 118*     Lab Results   Component Value Date    LABA1C 6.2 09/15/2020     PT/INR:   Recent Labs     09/16/20  0024   PROTIME 12.0   INR 1.03     APTT:   Recent Labs     09/16/20  0024   APTT 28.1     LFT:   Recent Labs     09/18/20  0417   AST 39*   ALT 28   BILIDIR <0.2   BILITOT 0.3   ALKPHOS 82     U/A:    Recent Labs     09/17/20  1400   COLORU Yellow   PHUR 6.5   WBCUA 0-2   RBCUA 0-2   BACTERIA Rare*   CLARITYU Clear   SPECGRAV 1.010   LEUKOCYTESUR Negative   UROBILINOGEN 0.2   BILIRUBINUR Negative   BLOODU TRACE-INTACT*   GLUCOSEU Negative     Cath 9/17/2020:  LV overall preserved EF 55%, lateral wall motion abnormality.  LM ok.  LAD 70-80% mid, serial 80% distal/apical lesions. Cx with lg OM with long 95% stenosis,  RCA dom 90% distal bend.          Echo 9/17/2020:   Mild concentric left ventricular hypertrophy. Normal left ventricular systolic function with an estimated ejection fraction of 60%-65%. Normal diastolic function. Trivial mitral regurgitation. Aortic sclerosis - mild aortic stenosis with a peak velocity of 2.39m/s and   a mean pressure gradient of 13mmHg. Trivial tricuspid regurgitation. The pulmonic valve is not well visualized. Estimated pulmonary artery systolic pressure is at 26 mmHg assuming a right   atrial pressure of 3 mmHg. Chest X-Ray 9/16/2020:  No acute disease       EKG 9/16/2020:  NSR     Carotid Duplex 9/17/2020:  Right Impression    The right internal carotid artery appears to have a <50% diameter reducing    stenosis based on velocity criteria. The right external carotid artery appears to have >50% diameter reduction    based on velocity criteria. The right vertebral artery demonstrates normal antegrade flow. The right subclavian artery is visualized and demonstrates multiphasic flow. Left Impression    The left internal carotid artery appears to have a 50-69% diameter reducing    stenosis based on velocity criteria. The left vertebral artery demonstrates normal antegrade flow. The left subclavian artery is visualized and demonstrates multiphasic flow. There are no previous studies for comparison. LE vein mapping 9/17/2020:  Right    There is no evidence of superficial vein thrombosis involving the right great    and small saphenous veins. Please refer to the table below for diameter measurements. Left    There is no evidence of superficial vein thrombosis involving the left great    and small saphenous veins. Please refer to the table below for diameter measurements. There are no previous studies for comparison. ASSESSMENT:   Patient Active Problem List:     Uncontrolled hypertension     Chronic eczematous otitis externa of both ears     Cardiac murmur     Chronic pain syndrome     Chronic neck pain     Chest pain     NSTEMI (non-ST elevated myocardial infarction) ([facility] [address]      Neuro: awake, alert and oriented x 3  CV:   Sinus  Pulm:  clear    PLAN:   Further discussed planned operation with patient and significant other in detail. All questions answered and agrees to surgery. Will obtain chest CTA due to aortic dilatation. CABG surgery and placement of LAAA clip tentatively on Tuesday with Dr Yang Schools  Discussed patient with Dr Trent Garcia who is agreeable to assessment and plan. Kelsi Jon, APRN  [phone] pager  9/18/2020  10:39 AM     Note reviewed, events of last 24 hours reviewed along with vital signs and I/Os and patient examined. Images reviewed.   Assessment & Plans  OR Tuesday - CABG/ NATALIE clip  CT chest images personally reviewed- mild aortic ectasia, greatest diameter 3.2cm, will remain under surveillance  L ICA 50-69% - spoke directly with Dr. Benito Argueta (vascular sx) - no surgical intervention per him  Continue heparin gtt  Pre op    Hugh Harris MD  Cardiothoracic Surgery

## 2020-09-19 LAB
ANION GAP SERPL CALCULATED.3IONS-SCNC: 8 MMOL/L (ref 3–16)
ANTI-XA UNFRAC HEPARIN: 0.62 IU/ML (ref 0.3–0.7)
BUN BLDV-MCNC: 15 MG/DL (ref 7–20)
CALCIUM SERPL-MCNC: 9.3 MG/DL (ref 8.3–10.6)
CHLORIDE BLD-SCNC: 106 MMOL/L (ref 99–110)
CHOLESTEROL, TOTAL: 180 MG/DL (ref 0–199)
CO2: 27 MMOL/L (ref 21–32)
CREAT SERPL-MCNC: 0.6 MG/DL (ref 0.6–1.2)
ESTIMATED AVERAGE GLUCOSE: 128.4 MG/DL
GFR AFRICAN AMERICAN: >60
GFR NON-AFRICAN AMERICAN: >60
GLUCOSE BLD-MCNC: 106 MG/DL (ref 70–99)
GLUCOSE BLD-MCNC: 112 MG/DL (ref 70–99)
GLUCOSE BLD-MCNC: 126 MG/DL (ref 70–99)
GLUCOSE BLD-MCNC: 139 MG/DL (ref 70–99)
GLUCOSE BLD-MCNC: 141 MG/DL (ref 70–99)
HBA1C MFR BLD: 6.1 %
HCT VFR BLD CALC: 39.6 % (ref 36–48)
HDLC SERPL-MCNC: 49 MG/DL (ref 40–60)
HEMOGLOBIN: 13 G/DL (ref 12–16)
LDL CHOLESTEROL CALCULATED: 113 MG/DL
MAGNESIUM: 2 MG/DL (ref 1.8–2.4)
MCH RBC QN AUTO: 27.9 PG (ref 26–34)
MCHC RBC AUTO-ENTMCNC: 32.9 G/DL (ref 31–36)
MCV RBC AUTO: 84.7 FL (ref 80–100)
PDW BLD-RTO: 13.5 % (ref 12.4–15.4)
PERFORMED ON: ABNORMAL
PLATELET # BLD: 247 K/UL (ref 135–450)
PMV BLD AUTO: 8.4 FL (ref 5–10.5)
POTASSIUM SERPL-SCNC: 4.9 MMOL/L (ref 3.5–5.1)
RBC # BLD: 4.67 M/UL (ref 4–5.2)
SODIUM BLD-SCNC: 141 MMOL/L (ref 136–145)
TRIGL SERPL-MCNC: 90 MG/DL (ref 0–150)
TSH REFLEX: 1.99 UIU/ML (ref 0.27–4.2)
VITAMIN D 25-HYDROXY: 29.6 NG/ML
VLDLC SERPL CALC-MCNC: 18 MG/DL
WBC # BLD: 7 K/UL (ref 4–11)

## 2020-09-19 PROCEDURE — 80048 BASIC METABOLIC PNL TOTAL CA: CPT

## 2020-09-19 PROCEDURE — 2580000003 HC RX 258: Performed by: STUDENT IN AN ORGANIZED HEALTH CARE EDUCATION/TRAINING PROGRAM

## 2020-09-19 PROCEDURE — 6360000002 HC RX W HCPCS: Performed by: INTERNAL MEDICINE

## 2020-09-19 PROCEDURE — 85027 COMPLETE CBC AUTOMATED: CPT

## 2020-09-19 PROCEDURE — 85520 HEPARIN ASSAY: CPT

## 2020-09-19 PROCEDURE — 99233 SBSQ HOSP IP/OBS HIGH 50: CPT | Performed by: THORACIC SURGERY (CARDIOTHORACIC VASCULAR SURGERY)

## 2020-09-19 PROCEDURE — 6370000000 HC RX 637 (ALT 250 FOR IP): Performed by: STUDENT IN AN ORGANIZED HEALTH CARE EDUCATION/TRAINING PROGRAM

## 2020-09-19 PROCEDURE — 83735 ASSAY OF MAGNESIUM: CPT

## 2020-09-19 PROCEDURE — 6370000000 HC RX 637 (ALT 250 FOR IP): Performed by: INTERNAL MEDICINE

## 2020-09-19 PROCEDURE — 2060000000 HC ICU INTERMEDIATE R&B

## 2020-09-19 PROCEDURE — 36415 COLL VENOUS BLD VENIPUNCTURE: CPT

## 2020-09-19 RX ADMIN — ATORVASTATIN CALCIUM 80 MG: 40 TABLET, FILM COATED ORAL at 21:34

## 2020-09-19 RX ADMIN — METOPROLOL TARTRATE 12.5 MG: 25 TABLET, FILM COATED ORAL at 10:04

## 2020-09-19 RX ADMIN — INSULIN LISPRO 1 UNITS: 100 INJECTION, SOLUTION INTRAVENOUS; SUBCUTANEOUS at 10:17

## 2020-09-19 RX ADMIN — Medication 10 ML: at 10:04

## 2020-09-19 RX ADMIN — METOPROLOL TARTRATE 12.5 MG: 25 TABLET, FILM COATED ORAL at 21:34

## 2020-09-19 RX ADMIN — HEPARIN SODIUM 14 UNITS/KG/HR: 10000 INJECTION, SOLUTION INTRAVENOUS at 17:14

## 2020-09-19 RX ADMIN — ASPIRIN 81 MG: 81 TABLET, CHEWABLE ORAL at 10:04

## 2020-09-19 RX ADMIN — TRAMADOL HYDROCHLORIDE 50 MG: 50 TABLET, FILM COATED ORAL at 14:30

## 2020-09-19 RX ADMIN — AMLODIPINE BESYLATE 5 MG: 5 TABLET ORAL at 10:04

## 2020-09-19 ASSESSMENT — PAIN DESCRIPTION - DESCRIPTORS: DESCRIPTORS: ACHING

## 2020-09-19 ASSESSMENT — PAIN DESCRIPTION - PROGRESSION: CLINICAL_PROGRESSION: NOT CHANGED

## 2020-09-19 ASSESSMENT — PAIN SCALES - GENERAL
PAINLEVEL_OUTOF10: 0
PAINLEVEL_OUTOF10: 6
PAINLEVEL_OUTOF10: 1
PAINLEVEL_OUTOF10: 0

## 2020-09-19 ASSESSMENT — PAIN DESCRIPTION - ORIENTATION: ORIENTATION: LOWER

## 2020-09-19 ASSESSMENT — ENCOUNTER SYMPTOMS
SHORTNESS OF BREATH: 0
ABDOMINAL PAIN: 0
CHEST TIGHTNESS: 0
COUGH: 0

## 2020-09-19 ASSESSMENT — PAIN DESCRIPTION - FREQUENCY: FREQUENCY: CONTINUOUS

## 2020-09-19 ASSESSMENT — PAIN DESCRIPTION - LOCATION: LOCATION: NECK

## 2020-09-19 ASSESSMENT — PAIN DESCRIPTION - PAIN TYPE: TYPE: CHRONIC PAIN

## 2020-09-19 ASSESSMENT — PAIN DESCRIPTION - ONSET: ONSET: ON-GOING

## 2020-09-19 ASSESSMENT — PAIN - FUNCTIONAL ASSESSMENT: PAIN_FUNCTIONAL_ASSESSMENT: ACTIVITIES ARE NOT PREVENTED

## 2020-09-19 NOTE — PLAN OF CARE
Problem: HEMODYNAMIC STATUS  Goal: Patient has stable vital signs and fluid balance  9/19/2020 0126 by Radha Richmond RN  Outcome: Ongoing  Note: Pt VSS. No complaints of chest pain.  Will continue to monitor

## 2020-09-19 NOTE — PROGRESS NOTES
Progress Note    Pre op    CC: denies any chest apin        Vital Signs:                                                 /67   Pulse 69   Temp 98.7 °F (37.1 °C) (Oral)   Resp 16   Ht 5' 7\" (1.702 m)   Wt 162 lb 0.6 oz (73.5 kg)   SpO2 99%   BMI 25.38 kg/m²           Admission Weight: 159 lb (72.1 kg)      Vent Settings:  Vent Information  SpO2: 99 %     Drips:  heparin    I/O:      Intake/Output Summary (Last 24 hours) at 9/19/2020 1226  Last data filed at 9/18/2020 1520  Gross per 24 hour   Intake 240 ml   Output --   Net 240 ml     Chest Tube:     O/E  GEN: AAO x 3  HEENT: unremarkable  CV: reg, s1s2 present  Pulm: decreased  Abd: soft, nt, nd, no peritoneal signs  Ext: warm, edema, wound c/d/i    Data Review:  CBC:   Recent Labs     09/17/20  0651 09/18/20  0417 09/19/20  0429   WBC 7.6 7.4 7.0   HGB 14.4 13.0 13.0   HCT 44.1 40.1 39.6   MCV 84.4 85.3 84.7    240 247     BMP:   Recent Labs     09/17/20  0651 09/18/20  0417 09/19/20  0429    137 141   K 5.0 4.8 4.9    105 106   CO2 25 25 27   BUN 18 19 15   CREATININE 0.6 0.6 0.6   CALCIUM 9.3 9.1 9.3   MG 2.20 2.00 2.00     Cardiac Enzymes:   Recent Labs     09/16/20  1652 09/16/20  2111   TROPONINI 2.19* 1.99*     PT/INR: No results for input(s): PROTIME, INR in the last 72 hours. APTT: No results for input(s): APTT in the last 72 hours. Assessment/Plan:  Pre op   Plavix washout  CABG/ NATALIE clip on Tuesday with Dr. Fabiana Batista  Clinically asymptomatic  All questions answered.   Asa/BB12.5mg/heparin gtt  Spoke with Vascular surgery (Dr. Travon Boggs) regarding carotid duplex findings  -no need for any surgical intervention   -     Igor Nathan MD  9/19/2020  12:26 PM

## 2020-09-19 NOTE — PLAN OF CARE
Problem: Pain:  Goal: Control of chronic pain  Description: Control of chronic pain  Outcome: Ongoing  Monitoring pain trend, implementing non-pharm interventions and also admins PRN pain medication. Will continue to monitor and maintain Pts goal for pain relief. Problem: OXYGENATION/RESPIRATORY FUNCTION  Goal: Patient will achieve/maintain normal respiratory rate/effort  Description: Respiratory rate and effort will be within normal limits for the patient  Outcome: Ongoing   Patient remains free from any s/s of resp. distress, unlabored breathing at rest, minimal c/o with SOB when exerted,  non-productive cough. SpO2 maintained greater than 92% while on room air.

## 2020-09-19 NOTE — PROGRESS NOTES
Progress Note    Admit Date: 9/16/2020  Day: 3  Diet: DIET CARDIAC;    CC: Chest pain    Interval history: Patient seen at bedside this morning. Denied chest pain, shortness of breath, abdominal pain. HPI: 63 y.o. female with a PMH HTN presenting with chest pain. Patient notes she started having chest pain while resting at approx 10:15 this evening, started mid to right sternum and radiating to her left shoulder. Pain is 8/10 in intensity and feels as though she had something sitting on her chest. Also had associated diaphoresis, nausea, and SOB. Pain was alleviated with SL nitro. She has been having intermittent episodes of what she attributed to heartburn for the last week. She states the pain would happen at night and would improve with Tums. She does not take ASA. Current 1/2 PPD smoker for the last 30 years. Denies alcohol use or recreational drug use.  Father with history of CAD in his 46s.      Patient had low risk stress test 2/2020.   (from H&P on admission)    Medications:     Scheduled Meds:   amLODIPine  5 mg Oral Daily    sodium chloride flush  10 mL Intravenous 2 times per day    atorvastatin  80 mg Oral Nightly    aspirin  81 mg Oral Daily    insulin lispro  0-6 Units Subcutaneous TID WC    insulin lispro  0-3 Units Subcutaneous Nightly    metoprolol tartrate  12.5 mg Oral BID     Continuous Infusions:   heparin (porcine) 14 Units/kg/hr (09/18/20 1550)    dextrose       PRN Meds:traMADol, nitroGLYCERIN, sodium chloride flush, acetaminophen **OR** acetaminophen, polyethylene glycol, promethazine **OR** ondansetron, heparin (porcine), heparin (porcine), glucose, dextrose, glucagon (rDNA), dextrose    Objective:   Vitals:   T-max:  Patient Vitals for the past 8 hrs:   BP Temp Temp src Pulse Resp SpO2 Weight   09/19/20 0431 126/67 98.2 °F (36.8 °C) Oral 73 16 99 % 162 lb 0.6 oz (73.5 kg)       Intake/Output Summary (Last 24 hours) at 9/19/2020 0817  Last data filed at 9/18/2020 1520  Gross per 24 hour   Intake 480 ml   Output --   Net 480 ml       Review of Systems   Constitutional: Negative for chills and fever. Respiratory: Negative for cough, chest tightness and shortness of breath. Cardiovascular: Negative for chest pain and leg swelling. Gastrointestinal: Negative for abdominal pain. Genitourinary: Negative for dysuria. Neurological: Negative for dizziness and light-headedness. Physical Exam  Vitals signs reviewed. Constitutional:       General: She is not in acute distress. Appearance: She is not diaphoretic. HENT:      Head: Normocephalic. Cardiovascular:      Rate and Rhythm: Normal rate and regular rhythm. Heart sounds: Murmur present. Pulmonary:      Effort: Pulmonary effort is normal. No respiratory distress. Breath sounds: No wheezing. Abdominal:      General: Bowel sounds are normal.      Tenderness: There is no guarding or rebound. Neurological:      General: No focal deficit present. Mental Status: She is alert and oriented to person, place, and time. LABS:    CBC:   Recent Labs     09/17/20  0651 09/18/20 0417 09/19/20 0429   WBC 7.6 7.4 7.0   HGB 14.4 13.0 13.0   HCT 44.1 40.1 39.6    240 247   MCV 84.4 85.3 84.7     Renal:    Recent Labs     09/17/20  0651 09/18/20 0417 09/19/20  0429    137 141   K 5.0 4.8 4.9    105 106   CO2 25 25 27   BUN 18 19 15   CREATININE 0.6 0.6 0.6   GLUCOSE 125* 120* 112*   CALCIUM 9.3 9.1 9.3   MG 2.20 2.00 2.00   ANIONGAP 9 7 8     Hepatic:   Recent Labs     09/18/20 0417   AST 39*   ALT 28   BILITOT 0.3   BILIDIR <0.2   PROT 5.9*   LABALBU 3.6   ALKPHOS 82     Troponin:   Recent Labs     09/16/20  0902 09/16/20  1652 09/16/20  2111   TROPONINI 2.03* 2.19* 1.99*     BNP: No results for input(s): BNP in the last 72 hours.   Lipids:   Recent Labs     09/18/20  0441   CHOL 180   HDL 49     ABGs:  No results for input(s): PHART, DLO6VYI, PO2ART, SMB9JQL, BEART, THGBART, Q5VMUMXW, WGD1LKJ in the last 72 hours. INR:   No results for input(s): INR in the last 72 hours. Lactate: No results for input(s): LACTATE in the last 72 hours. Cultures:  -----------------------------------------------------------------  RAD:   CTA CHEST W WO CONTRAST   Final Result      No acute aortic abnormality. VL PRE OP VEIN MAPPING   Final Result      VL DUP CAROTID BILATERAL   Final Result      XR CHEST PORTABLE   Final Result     No evidence of acute cardiopulmonary disease. Assessment/Plan:     NSTEMI   Patient presenting with typical chest pain, since resolved. EKG with t-wave inversion V1, ST depression V2 and V3. Repeat EKG NSR. KAPIL score 3. Troponin trend 0.73-2.03-2.19-1.99. Echo 50-65% EF, normal diastolic function. Chest CTA- no acute aortic abnormaity  - CABG Tuesday 9/22 tentatively with Dr. Nusrat Garcia   - CVTS following  - continue heparin gtt  - continue ASA/statin  - SL nitro PRN  - monitor on telemetry   - continue home cozaar   - lipid panel- , otherwise WNL     Hyperglycemia  Glucose 250 on renal panel. No prior history of diabetes.  A1C 6.2.  - hypoglycemia protocol  - accuchecks AC/HS  - LDSSI     HTN  - continue home norvasc and cozaar    Code Status: full  FEN: diet cardiac  PPX: heparin  DISPO: ip    Violetta Le DO, PGY-1  09/19/20  8:17 AM    This patient has been staffed and discussed with Elpidio Acosta MD.

## 2020-09-20 LAB
ANION GAP SERPL CALCULATED.3IONS-SCNC: 8 MMOL/L (ref 3–16)
ANTI-XA UNFRAC HEPARIN: 0.33 IU/ML (ref 0.3–0.7)
ANTI-XA UNFRAC HEPARIN: 0.36 IU/ML (ref 0.3–0.7)
ANTI-XA UNFRAC HEPARIN: 0.54 IU/ML (ref 0.3–0.7)
BUN BLDV-MCNC: 18 MG/DL (ref 7–20)
CALCIUM SERPL-MCNC: 9.2 MG/DL (ref 8.3–10.6)
CHLORIDE BLD-SCNC: 104 MMOL/L (ref 99–110)
CO2: 27 MMOL/L (ref 21–32)
CREAT SERPL-MCNC: 0.5 MG/DL (ref 0.6–1.2)
GFR AFRICAN AMERICAN: >60
GFR NON-AFRICAN AMERICAN: >60
GLUCOSE BLD-MCNC: 105 MG/DL (ref 70–99)
GLUCOSE BLD-MCNC: 119 MG/DL (ref 70–99)
GLUCOSE BLD-MCNC: 120 MG/DL (ref 70–99)
GLUCOSE BLD-MCNC: 121 MG/DL (ref 70–99)
GLUCOSE BLD-MCNC: 134 MG/DL (ref 70–99)
GLUCOSE BLD-MCNC: 141 MG/DL (ref 70–99)
GLUCOSE BLD-MCNC: 98 MG/DL (ref 70–99)
HCT VFR BLD CALC: 37.5 % (ref 36–48)
HEMOGLOBIN: 12.6 G/DL (ref 12–16)
MAGNESIUM: 2 MG/DL (ref 1.8–2.4)
MCH RBC QN AUTO: 28 PG (ref 26–34)
MCHC RBC AUTO-ENTMCNC: 33.6 G/DL (ref 31–36)
MCV RBC AUTO: 83.4 FL (ref 80–100)
MRSA CULTURE ONLY: NORMAL
PDW BLD-RTO: 13.6 % (ref 12.4–15.4)
PERFORMED ON: ABNORMAL
PERFORMED ON: NORMAL
PLATELET # BLD: 249 K/UL (ref 135–450)
PMV BLD AUTO: 8.4 FL (ref 5–10.5)
POTASSIUM SERPL-SCNC: 4.8 MMOL/L (ref 3.5–5.1)
RBC # BLD: 4.5 M/UL (ref 4–5.2)
SODIUM BLD-SCNC: 139 MMOL/L (ref 136–145)
WBC # BLD: 6.5 K/UL (ref 4–11)

## 2020-09-20 PROCEDURE — 85027 COMPLETE CBC AUTOMATED: CPT

## 2020-09-20 PROCEDURE — 85520 HEPARIN ASSAY: CPT

## 2020-09-20 PROCEDURE — 6370000000 HC RX 637 (ALT 250 FOR IP): Performed by: INTERNAL MEDICINE

## 2020-09-20 PROCEDURE — 6370000000 HC RX 637 (ALT 250 FOR IP): Performed by: STUDENT IN AN ORGANIZED HEALTH CARE EDUCATION/TRAINING PROGRAM

## 2020-09-20 PROCEDURE — 83735 ASSAY OF MAGNESIUM: CPT

## 2020-09-20 PROCEDURE — 6360000002 HC RX W HCPCS: Performed by: INTERNAL MEDICINE

## 2020-09-20 PROCEDURE — 36415 COLL VENOUS BLD VENIPUNCTURE: CPT

## 2020-09-20 PROCEDURE — 80048 BASIC METABOLIC PNL TOTAL CA: CPT

## 2020-09-20 PROCEDURE — 2060000000 HC ICU INTERMEDIATE R&B

## 2020-09-20 RX ADMIN — INSULIN LISPRO 1 UNITS: 100 INJECTION, SOLUTION INTRAVENOUS; SUBCUTANEOUS at 11:37

## 2020-09-20 RX ADMIN — TRAMADOL HYDROCHLORIDE 50 MG: 50 TABLET, FILM COATED ORAL at 14:02

## 2020-09-20 RX ADMIN — METOPROLOL TARTRATE 12.5 MG: 25 TABLET, FILM COATED ORAL at 20:45

## 2020-09-20 RX ADMIN — HEPARIN SODIUM 12 UNITS/KG/HR: 10000 INJECTION, SOLUTION INTRAVENOUS at 19:37

## 2020-09-20 RX ADMIN — METOPROLOL TARTRATE 12.5 MG: 25 TABLET, FILM COATED ORAL at 08:44

## 2020-09-20 RX ADMIN — AMLODIPINE BESYLATE 5 MG: 5 TABLET ORAL at 08:44

## 2020-09-20 RX ADMIN — ASPIRIN 81 MG: 81 TABLET, CHEWABLE ORAL at 08:44

## 2020-09-20 RX ADMIN — ATORVASTATIN CALCIUM 80 MG: 40 TABLET, FILM COATED ORAL at 20:45

## 2020-09-20 ASSESSMENT — ENCOUNTER SYMPTOMS
COUGH: 0
SHORTNESS OF BREATH: 0
ABDOMINAL PAIN: 0
CHEST TIGHTNESS: 0

## 2020-09-20 ASSESSMENT — PAIN DESCRIPTION - ORIENTATION: ORIENTATION: LOWER;POSTERIOR

## 2020-09-20 ASSESSMENT — PAIN SCALES - GENERAL
PAINLEVEL_OUTOF10: 0
PAINLEVEL_OUTOF10: 6
PAINLEVEL_OUTOF10: 0

## 2020-09-20 ASSESSMENT — PAIN DESCRIPTION - DESCRIPTORS: DESCRIPTORS: ACHING

## 2020-09-20 ASSESSMENT — PAIN DESCRIPTION - ONSET: ONSET: PROGRESSIVE

## 2020-09-20 ASSESSMENT — PAIN DESCRIPTION - FREQUENCY: FREQUENCY: CONTINUOUS

## 2020-09-20 ASSESSMENT — PAIN DESCRIPTION - PAIN TYPE: TYPE: CHRONIC PAIN

## 2020-09-20 ASSESSMENT — PAIN - FUNCTIONAL ASSESSMENT: PAIN_FUNCTIONAL_ASSESSMENT: ACTIVITIES ARE NOT PREVENTED

## 2020-09-20 ASSESSMENT — PAIN DESCRIPTION - PROGRESSION
CLINICAL_PROGRESSION: GRADUALLY WORSENING
CLINICAL_PROGRESSION: RESOLVED

## 2020-09-20 ASSESSMENT — PAIN DESCRIPTION - LOCATION: LOCATION: NECK

## 2020-09-20 NOTE — PROGRESS NOTES
0526  Last data filed at 9/20/2020 0725  Gross per 24 hour   Intake 1302 ml   Output --   Net 1302 ml       Review of Systems   Constitutional: Negative for chills and fever. Respiratory: Negative for cough, chest tightness and shortness of breath. Cardiovascular: Negative for chest pain and leg swelling. Gastrointestinal: Negative for abdominal pain. Genitourinary: Negative for dysuria. Neurological: Negative for dizziness and light-headedness. Physical Exam  Vitals signs reviewed. Constitutional:       General: She is not in acute distress. Appearance: She is not diaphoretic. HENT:      Head: Normocephalic. Cardiovascular:      Rate and Rhythm: Normal rate and regular rhythm. Heart sounds: Murmur present. Pulmonary:      Effort: Pulmonary effort is normal. No respiratory distress. Breath sounds: No wheezing. Abdominal:      General: Bowel sounds are normal.      Tenderness: There is no guarding or rebound. Neurological:      General: No focal deficit present. Mental Status: She is alert and oriented to person, place, and time. LABS:    CBC:   Recent Labs     09/18/20 0417 09/19/20 0429 09/20/20 0429   WBC 7.4 7.0 6.5   HGB 13.0 13.0 12.6   HCT 40.1 39.6 37.5    247 249   MCV 85.3 84.7 83.4     Renal:    Recent Labs     09/18/20 0417 09/19/20 0429 09/20/20 0429    141 139   K 4.8 4.9 4.8    106 104   CO2 25 27 27   BUN 19 15 18   CREATININE 0.6 0.6 0.5*   GLUCOSE 120* 112* 119*   CALCIUM 9.1 9.3 9.2   MG 2.00 2.00 2.00   ANIONGAP 7 8 8     Hepatic:   Recent Labs     09/18/20 0417   AST 39*   ALT 28   BILITOT 0.3   BILIDIR <0.2   PROT 5.9*   LABALBU 3.6   ALKPHOS 82     Troponin:   No results for input(s): TROPONINI in the last 72 hours. BNP: No results for input(s): BNP in the last 72 hours.   Lipids:   Recent Labs     09/18/20 0441   CHOL 180   HDL 49     ABGs:  No results for input(s): PHART, MDD6TOT, PO2ART, YMC7BAH, BEART, THGBART, D6KXIEMV, WGU8UMY in the last 72 hours. INR:   No results for input(s): INR in the last 72 hours. Lactate: No results for input(s): LACTATE in the last 72 hours. Cultures:  -----------------------------------------------------------------  RAD:   CTA CHEST W WO CONTRAST   Final Result      No acute aortic abnormality. VL PRE OP VEIN MAPPING   Final Result      VL DUP CAROTID BILATERAL   Final Result      XR CHEST PORTABLE   Final Result     No evidence of acute cardiopulmonary disease. Assessment/Plan:     NSTEMI   Patient presenting with typical chest pain, since resolved. EKG with t-wave inversion V1, ST depression V2 and V3. Repeat EKG NSR. KAPIL score 3. Troponin trend 0.73-2.03-2.19-1.99. Echo 50-65% EF, normal diastolic function. Chest CTA- no acute aortic abnormaity  - CABG Tuesday 9/22 tentatively with Dr. Naldo Singh following: CABG/NATALIE clip on Tuesday with Dr. Eduarda Paniagua, no need for sgx intervention for carotid duplex findings (per Dr. Goyal [facility])  - continue heparin gtt  - continue ASA/statin  - SL nitro PRN  - monitor on telemetry   - continue home cozaar   - lipid panel- , otherwise WNL     Hyperglycemia  Glucose 250 on renal panel. No prior history of diabetes.  A1C 6.2.  - hypoglycemia protocol  - accuchecks AC/HS  - LDSSI      HTN  - continue home norvasc and cozaar    Code Status: full  FEN: diet cardiac  PPX: heparin  DISPO: ip, CABG/NATALIE clip on Tuesday (Dr. Eduarda Paniagua)    Canelo Torres MD, PGY-2  09/20/20  8:03 AM    This patient will be staffed and discussed with Jovanni Rush MD.

## 2020-09-20 NOTE — PLAN OF CARE
Problem: Pain:  Description: Pain management should include both nonpharmacologic and pharmacologic interventions. Goal: Pain level will decrease  Description: Pain level will decrease  Outcome: Met This Shift  Note: Denies c/o cp sob cont to monitor     Problem: Pain:  Description: Pain management should include both nonpharmacologic and pharmacologic interventions. Goal: Control of acute pain  Description: Control of acute pain  Outcome: Met This Shift     Problem: Pain:  Description: Pain management should include both nonpharmacologic and pharmacologic interventions. Goal: Control of chronic pain  Description: Control of chronic pain  Outcome: Met This Shift     Problem: HEMODYNAMIC STATUS  Goal: Patient has stable vital signs and fluid balance  Outcome: Met This Shift  Note: vss     Problem: OXYGENATION/RESPIRATORY FUNCTION  Goal: Patient will achieve/maintain normal respiratory rate/effort  Description: Respiratory rate and effort will be within normal limits for the patient  Outcome: Met This Shift  Note: Room air sats WNL's     Problem: PSYCHOSOCIAL NEEDS  Goal: Demonstrates ability to cope with illness  Outcome: Met This Shift     Problem: Cardiac Output - Decreased:  Goal: Hemodynamic stability will improve  Description: Hemodynamic stability will improve  Outcome: Met This Shift  Note: NSR vss     Problem: Bleeding:  Goal: Will show no signs and symptoms of excessive bleeding  Description: Will show no signs and symptoms of excessive bleeding  Outcome: Met This Shift  Note: Rt.  Groin post angio site without bleeding soft some old  bruising  +2/+2 PP     Problem: Discharge Planning:  Goal: Discharged to appropriate level of care  Description: Discharged to appropriate level of care  Outcome: Met This Shift  Note: Home when medically stable

## 2020-09-21 ENCOUNTER — APPOINTMENT (OUTPATIENT)
Dept: GENERAL RADIOLOGY | Age: 60
DRG: 234 | End: 2020-09-21
Payer: COMMERCIAL

## 2020-09-21 LAB
ABO/RH: NORMAL
ANION GAP SERPL CALCULATED.3IONS-SCNC: 7 MMOL/L (ref 3–16)
ANTI-XA UNFRAC HEPARIN: 0.26 IU/ML (ref 0.3–0.7)
ANTI-XA UNFRAC HEPARIN: 0.31 IU/ML (ref 0.3–0.7)
ANTI-XA UNFRAC HEPARIN: 0.64 IU/ML (ref 0.3–0.7)
ANTIBODY SCREEN: NORMAL
BUN BLDV-MCNC: 23 MG/DL (ref 7–20)
CALCIUM SERPL-MCNC: 9.2 MG/DL (ref 8.3–10.6)
CHLORIDE BLD-SCNC: 105 MMOL/L (ref 99–110)
CHOLESTEROL, TOTAL: 243 MG/DL (ref 0–199)
CO2: 27 MMOL/L (ref 21–32)
CREAT SERPL-MCNC: 0.6 MG/DL (ref 0.6–1.2)
GFR AFRICAN AMERICAN: >60
GFR NON-AFRICAN AMERICAN: >60
GLUCOSE BLD-MCNC: 116 MG/DL (ref 70–99)
GLUCOSE BLD-MCNC: 118 MG/DL (ref 70–99)
GLUCOSE BLD-MCNC: 119 MG/DL (ref 70–99)
GLUCOSE BLD-MCNC: 140 MG/DL (ref 70–99)
GLUCOSE BLD-MCNC: 94 MG/DL (ref 70–99)
HCT VFR BLD CALC: 38.9 % (ref 36–48)
HDLC SERPL-MCNC: 56 MG/DL (ref 40–60)
HEMOGLOBIN: 12.7 G/DL (ref 12–16)
LDL CHOLESTEROL CALCULATED: 169 MG/DL
MAGNESIUM: 2.1 MG/DL (ref 1.8–2.4)
MCH RBC QN AUTO: 27.8 PG (ref 26–34)
MCHC RBC AUTO-ENTMCNC: 32.5 G/DL (ref 31–36)
MCV RBC AUTO: 85.3 FL (ref 80–100)
PDW BLD-RTO: 13.7 % (ref 12.4–15.4)
PERFORMED ON: ABNORMAL
PERFORMED ON: NORMAL
PLATELET # BLD: 242 K/UL (ref 135–450)
PMV BLD AUTO: 8.5 FL (ref 5–10.5)
POTASSIUM SERPL-SCNC: 4.8 MMOL/L (ref 3.5–5.1)
RBC # BLD: 4.57 M/UL (ref 4–5.2)
SODIUM BLD-SCNC: 139 MMOL/L (ref 136–145)
TRIGL SERPL-MCNC: 90 MG/DL (ref 0–150)
VLDLC SERPL CALC-MCNC: 18 MG/DL
WBC # BLD: 6.1 K/UL (ref 4–11)

## 2020-09-21 PROCEDURE — 6370000000 HC RX 637 (ALT 250 FOR IP): Performed by: STUDENT IN AN ORGANIZED HEALTH CARE EDUCATION/TRAINING PROGRAM

## 2020-09-21 PROCEDURE — 36415 COLL VENOUS BLD VENIPUNCTURE: CPT

## 2020-09-21 PROCEDURE — 6360000002 HC RX W HCPCS: Performed by: INTERNAL MEDICINE

## 2020-09-21 PROCEDURE — 83735 ASSAY OF MAGNESIUM: CPT

## 2020-09-21 PROCEDURE — 6370000000 HC RX 637 (ALT 250 FOR IP): Performed by: CLINICAL NURSE SPECIALIST

## 2020-09-21 PROCEDURE — 6370000000 HC RX 637 (ALT 250 FOR IP): Performed by: INTERNAL MEDICINE

## 2020-09-21 PROCEDURE — 6360000002 HC RX W HCPCS: Performed by: STUDENT IN AN ORGANIZED HEALTH CARE EDUCATION/TRAINING PROGRAM

## 2020-09-21 PROCEDURE — 2060000000 HC ICU INTERMEDIATE R&B

## 2020-09-21 PROCEDURE — 71046 X-RAY EXAM CHEST 2 VIEWS: CPT

## 2020-09-21 PROCEDURE — 80048 BASIC METABOLIC PNL TOTAL CA: CPT

## 2020-09-21 PROCEDURE — 86901 BLOOD TYPING SEROLOGIC RH(D): CPT

## 2020-09-21 PROCEDURE — 94150 VITAL CAPACITY TEST: CPT

## 2020-09-21 PROCEDURE — 85520 HEPARIN ASSAY: CPT

## 2020-09-21 PROCEDURE — 86900 BLOOD TYPING SEROLOGIC ABO: CPT

## 2020-09-21 PROCEDURE — 99232 SBSQ HOSP IP/OBS MODERATE 35: CPT | Performed by: NURSE PRACTITIONER

## 2020-09-21 PROCEDURE — 86850 RBC ANTIBODY SCREEN: CPT

## 2020-09-21 PROCEDURE — 85027 COMPLETE CBC AUTOMATED: CPT

## 2020-09-21 RX ORDER — CHLORHEXIDINE GLUCONATE 4 G/100ML
SOLUTION TOPICAL SEE ADMIN INSTRUCTIONS
Status: DISCONTINUED | OUTPATIENT
Start: 2020-09-21 | End: 2020-09-22

## 2020-09-21 RX ORDER — SODIUM CHLORIDE, SODIUM LACTATE, POTASSIUM CHLORIDE, CALCIUM CHLORIDE 600; 310; 30; 20 MG/100ML; MG/100ML; MG/100ML; MG/100ML
INJECTION, SOLUTION INTRAVENOUS CONTINUOUS
Status: DISCONTINUED | OUTPATIENT
Start: 2020-09-22 | End: 2020-09-22

## 2020-09-21 RX ORDER — CHLORHEXIDINE GLUCONATE 0.12 MG/ML
15 RINSE ORAL ONCE
Status: COMPLETED | OUTPATIENT
Start: 2020-09-22 | End: 2020-09-22

## 2020-09-21 RX ADMIN — AMLODIPINE BESYLATE 5 MG: 5 TABLET ORAL at 09:08

## 2020-09-21 RX ADMIN — HEPARIN SODIUM 2000 UNITS: 5000 INJECTION INTRAVENOUS; SUBCUTANEOUS at 06:30

## 2020-09-21 RX ADMIN — METOPROLOL TARTRATE 12.5 MG: 25 TABLET, FILM COATED ORAL at 20:35

## 2020-09-21 RX ADMIN — METOPROLOL TARTRATE 12.5 MG: 25 TABLET, FILM COATED ORAL at 09:08

## 2020-09-21 RX ADMIN — ASPIRIN 81 MG: 81 TABLET, CHEWABLE ORAL at 09:08

## 2020-09-21 RX ADMIN — MUPIROCIN: 20 OINTMENT TOPICAL at 20:35

## 2020-09-21 RX ADMIN — ATORVASTATIN CALCIUM 80 MG: 40 TABLET, FILM COATED ORAL at 20:35

## 2020-09-21 RX ADMIN — HEPARIN SODIUM 12 UNITS/KG/HR: 10000 INJECTION, SOLUTION INTRAVENOUS at 20:35

## 2020-09-21 RX ADMIN — CHLORHEXIDINE GLUCONATE: 213 SOLUTION TOPICAL at 20:38

## 2020-09-21 RX ADMIN — TRAMADOL HYDROCHLORIDE 50 MG: 50 TABLET, FILM COATED ORAL at 13:59

## 2020-09-21 ASSESSMENT — PAIN SCALES - GENERAL
PAINLEVEL_OUTOF10: 0
PAINLEVEL_OUTOF10: 6
PAINLEVEL_OUTOF10: 3
PAINLEVEL_OUTOF10: 0

## 2020-09-21 ASSESSMENT — PAIN DESCRIPTION - FREQUENCY: FREQUENCY: INTERMITTENT

## 2020-09-21 ASSESSMENT — ENCOUNTER SYMPTOMS
ABDOMINAL PAIN: 0
CHEST TIGHTNESS: 0
SHORTNESS OF BREATH: 0
COUGH: 0

## 2020-09-21 ASSESSMENT — PAIN DESCRIPTION - ONSET: ONSET: ON-GOING

## 2020-09-21 ASSESSMENT — PAIN DESCRIPTION - PAIN TYPE: TYPE: CHRONIC PAIN

## 2020-09-21 ASSESSMENT — PAIN DESCRIPTION - ORIENTATION: ORIENTATION: RIGHT;LEFT

## 2020-09-21 ASSESSMENT — PAIN - FUNCTIONAL ASSESSMENT: PAIN_FUNCTIONAL_ASSESSMENT: ACTIVITIES ARE NOT PREVENTED

## 2020-09-21 ASSESSMENT — PAIN DESCRIPTION - LOCATION: LOCATION: NECK

## 2020-09-21 ASSESSMENT — PAIN DESCRIPTION - DESCRIPTORS: DESCRIPTORS: ACHING;DISCOMFORT

## 2020-09-21 ASSESSMENT — PAIN DESCRIPTION - PROGRESSION: CLINICAL_PROGRESSION: NOT CHANGED

## 2020-09-21 NOTE — PROGRESS NOTES
[facility] [address]       In Patient  Progress note        Jerry Ro MD,  [address]   Daily Progress Note      Admit Date:  9/16/2020  Primary Cardiologist :  Janae Jackman     CC: Interval Hx:  Ms Deepti Mcduffie has 3 vessel CAD / CABG/ NATALIE clip on Tuesday with Dr. Isaac Rodríguez  She has been in Plavix wash out period  and planning surgery tomorrow   VSS no c/o cp/SOB/edema/PND or SUSSY     I have examined pt and reviewed notes / any lab work EKGs stress test, angiograms, & images reviewed    [facility] 9/17/20   1.  Preserved LV function with lateral wall motion abnormality. 2.  Severe multivessel coronary artery disease as described above. 3.  Recommend surgical consultation.     Echo 9/17/20  Mild concentric left ventricular hypertrophy.   Normal left ventricular systolic function with an estimated ejection   fraction of 60%-65%.   Normal diastolic function.   Trivial mitral regurgitation.   Aortic sclerosis - mild aortic stenosis with a peak velocity of 2.39m/s and   a mean pressure gradient of 13mmHg.   Trivial tricuspid regurgitation.   The pulmonic valve is not well visualized.   Estimated pulmonary artery systolic pressure is at 26 mmHg assuming a right   atrial pressure of 3 mmHg.     CT chest 9/18/20 no acute aortic abnormality     us carotid 9/17/20    The right internal carotid artery has a <50% diameter reducing stenosis     based on velocity criteria.     The right external carotid artery has a >50% diameter reduction based on     velocity criteria.     The left internal carotid artery has a 50-69% diameter reducing stenosis     based on velocity criteria.     The vertebral arteries demonstrate normal antegrade flow bilaterally.        VL mapping 9/17/20   There is no evidence of superficial vein thrombosis involving the right great    and small saphenous veins. Please refer to the table below for diameter measurements. Left    There is no evidence of superficial vein thrombosis involving the left great    and small saphenous veins. Please refer to the table below for diameter measurements. There are no previous studies for comparison.           Objective:   /79   Pulse 67   Temp 97.8 °F (36.6 °C) (Oral)   Resp 18   Ht 5' 7\" (1.702 m)   Wt 161 lb 9.6 oz (73.3 kg)   SpO2 100%   BMI 25.31 kg/m²       Intake/Output Summary (Last 24 hours) at 9/21/2020 1610  Last data filed at 9/21/2020 1221  Gross per 24 hour   Intake 680 ml   Output --   Net 680 ml     Wt Readings from Last 3 Encounters:   09/21/20 161 lb 9.6 oz (73.3 kg)   02/28/20 159 lb 13.3 oz (72.5 kg)   08/29/18 164 lb 3.2 oz (74.5 kg)       Physical Exam:   /79   Pulse 67   Temp 97.8 °F (36.6 °C) (Oral)   Resp 18   Ht 5' 7\" (1.702 m)   Wt 161 lb 9.6 oz (73.3 kg)   SpO2 100%   BMI 25.31 kg/m²   BP Readings from Last 3 Encounters:   09/21/20 129/79   02/28/20 (!) 179/91   08/29/18 (!) 142/100     Pulse Readings from Last 3 Encounters:   09/21/20 67   02/28/20 74   08/29/18 80       Intake/Output Summary (Last 24 hours) at 9/21/2020 1610  Last data filed at 9/21/2020 1221  Gross per 24 hour   Intake 680 ml   Output --   Net 680 ml     Wt Readings from Last 2 Encounters:   09/21/20 161 lb 9.6 oz (73.3 kg)   02/28/20 159 lb 13.3 oz (72.5 kg)     Constitutional: She is oriented to person, place, and time. She appears well-developed and well-nourished. In no acute distress. Head: Normocephalic and atraumatic. Eyes: PEERL   Neck: Neck supple. No JVD present. Carotid bruit is not present. No mass and no thyromegaly present. No lymphadenopathy present. Cardiovascular: Normal rate, regular rhythm, normal heart sounds and intact distal pulses. Exam reveals no gallop and no friction rub. No murmur heard. Pulmonary/Chest: Effort normal and breath sounds normal. No respiratory distress. She has no wheezes, rhonchi or rales.    Abdominal: Soft, non-tender. Bowel sounds and aorta are normal. She exhibits no organomegaly, mass or bruit. Extremities: No edema, cyanosis, or clubbing. Pulses are 2+ radial/carotid/dorsalis pedis and posterior tibial bilaterally. Neurological: oriented to person place    Skin: Skin is warm and dry. There is no rash or diaphoresis. Psychiatric: She has a normal mood and affect. Her speech is normal and behavior is normal.     Medications:    [START ON 9/22/2020] ceFAZolin (ANCEF) IVPB  2 g Intravenous On Call to OR    [START ON 9/22/2020] vancomycin (VANCOCIN) IV  1,000 mg Intravenous On Call to [address] [place] [address] [START ON 9/22/2020] chlorhexidine  15 mL Mouth/Throat Once    chlorhexidine   Topical See Admin Instructions    mupirocin   Nasal Once    [START ON 9/22/2020] mupirocin   Nasal Once    amLODIPine  5 mg Oral Daily    sodium chloride flush  10 mL Intravenous 2 times per day    atorvastatin  80 mg Oral Nightly    insulin lispro  0-6 Units Subcutaneous TID WC    insulin lispro  0-3 Units Subcutaneous Nightly    metoprolol tartrate  12.5 mg Oral BID      [START ON 9/22/2020] lactated ringers      heparin (porcine) 12 Units/kg/hr (09/21/20 1221)    dextrose       Recent Labs     09/19/20  0429 09/20/20  0429 09/21/20  0425   WBC 7.0 6.5 6.1   HGB 13.0 12.6 12.7    249 242     BMP:    Recent Labs     09/19/20  0429 09/20/20  0429 09/21/20  0425    139 139   K 4.9 4.8 4.8   CO2 27 27 27   BUN 15 18 23*   CREATININE 0.6 0.5* 0.6       Reviewed  available lab work,  EKGs, images, [facility]     Assessment    Admitted with cp/ trop elevation / NSTEMI  Trop 0.16>0.73>2.03>2.19>1.99   Father with history of CAD in his 46s.      [facility] 9/17/20   1.  Preserved LV function with lateral wall motion abnormality. 2.  Severe multivessel coronary artery disease as described above.   3.  Recommend surgical consultation.     Echo 9/17/20  Mild concentric left ventricular hypertrophy.   Normal left ventricular systolic function with an estimated ejection   fraction of 60%-65%.   Normal diastolic function.   Trivial mitral regurgitation.   Aortic sclerosis - mild aortic stenosis with a peak velocity of 2.39m/s and   a mean pressure gradient of 13mmHg.   Trivial tricuspid regurgitation.   The pulmonic valve is not well visualized.   Estimated pulmonary artery systolic pressure is at 26 mmHg assuming a right   atrial pressure of 3 mmHg.     DMT2  Managed per IM      HTN  Cont norvasc lopressor   Optimal      Tobacco use  Cessation     HLD   needs tighter control      Carotid stenosis   us carotid 9/17/20    The right internal carotid artery has a <50% diameter reducing stenosis     based on velocity criteria.     The right external carotid artery has a >50% diameter reduction based on     velocity criteria.     The left internal carotid artery has a 50-69% diameter reducing stenosis     based on velocity criteria.     The vertebral arteries demonstrate normal antegrade flow bilaterally.       Plan:  Cont norvasc ass lipitor lopressor hep gtt   Pt had Plavix on 9/17/20 awaiting 5 days of plavix washout prior to surgical intervention  Per CTS note: CABG surgery and placement of LAAA clip  Tuesday with Dr Anthony Spence  Will follow     SAMIRA Siegel

## 2020-09-21 NOTE — PROGRESS NOTES
Progress Note    Admit Date: 9/16/2020  Diet: DIET CARDIAC; Diet NPO Time Specified    CC: Chest pain    Interval history: Patient seen at bedside this morning. Denied chest pain, shortness of breath, abdominal pain. HPI: 63 y.o. female with a PMH HTN presenting with chest pain. Patient notes she started having chest pain while resting at approx 10:15 this evening, started mid to right sternum and radiating to her left shoulder. Pain is 8/10 in intensity and feels as though she had something sitting on her chest. Also had associated diaphoresis, nausea, and SOB. Pain was alleviated with SL nitro. She has been having intermittent episodes of what she attributed to heartburn for the last week. She states the pain would happen at night and would improve with Tums. She does not take ASA. Current 1/2 PPD smoker for the last 30 years. Denies alcohol use or recreational drug use.  Father with history of CAD in his 46s.      Patient had low risk stress test 2/2020.   (from H&P on admission)    Medications:     Scheduled Meds:   [START ON 9/22/2020] ceFAZolin (ANCEF) IVPB  2 g Intravenous On Call to OR    [START ON 9/22/2020] vancomycin (VANCOCIN) IV  1,000 mg Intravenous On Call to [address] [place] [address] [START ON 9/22/2020] chlorhexidine  15 mL Mouth/Throat Once    chlorhexidine   Topical See Admin Instructions    mupirocin   Nasal Once    [START ON 9/22/2020] mupirocin   Nasal Once    amLODIPine  5 mg Oral Daily    sodium chloride flush  10 mL Intravenous 2 times per day    atorvastatin  80 mg Oral Nightly    insulin lispro  0-6 Units Subcutaneous TID WC    insulin lispro  0-3 Units Subcutaneous Nightly    metoprolol tartrate  12.5 mg Oral BID     Continuous Infusions:   [START ON 9/22/2020] lactated ringers      heparin (porcine) 14 Units/kg/hr (09/21/20 4574)    dextrose       PRN Meds:traMADol, nitroGLYCERIN, sodium chloride flush, acetaminophen **OR** acetaminophen, polyethylene glycol, promethazine **OR** ondansetron, heparin (porcine), heparin (porcine), glucose, dextrose, glucagon (rDNA), dextrose    Objective:   Vitals:   T-max:  Patient Vitals for the past 8 hrs:   BP Temp Temp src Pulse Resp SpO2 Weight   09/21/20 0815 121/70 98.2 °F (36.8 °C) Oral 67 16 99 % --   09/21/20 0515 -- -- -- -- -- -- 161 lb 9.6 oz (73.3 kg)   09/21/20 0331 112/72 98.2 °F (36.8 °C) Oral 68 16 97 % --       Intake/Output Summary (Last 24 hours) at 9/21/2020 0934  Last data filed at 9/21/2020 2693  Gross per 24 hour   Intake 440 ml   Output --   Net 440 ml       Review of Systems   Constitutional: Negative for chills and fever. Respiratory: Negative for cough, chest tightness and shortness of breath. Cardiovascular: Negative for chest pain and leg swelling. Gastrointestinal: Negative for abdominal pain. Genitourinary: Negative for dysuria. Neurological: Negative for dizziness and light-headedness. Physical Exam  Vitals signs reviewed. Constitutional:       General: She is not in acute distress. Appearance: She is not diaphoretic. HENT:      Head: Normocephalic. Cardiovascular:      Rate and Rhythm: Normal rate and regular rhythm. Heart sounds: Murmur present. Pulmonary:      Effort: Pulmonary effort is normal. No respiratory distress. Breath sounds: No wheezing. Abdominal:      General: Bowel sounds are normal.      Tenderness: There is no guarding or rebound. Neurological:      General: No focal deficit present. Mental Status: She is alert and oriented to person, place, and time.          LABS:    CBC:   Recent Labs     09/19/20 0429 09/20/20 0429 09/21/20 0425   WBC 7.0 6.5 6.1   HGB 13.0 12.6 12.7   HCT 39.6 37.5 38.9    249 242   MCV 84.7 83.4 85.3     Renal:    Recent Labs     09/19/20 0429 09/20/20 0429 09/21/20 0425    139 139   K 4.9 4.8 4.8    104 105   CO2 27 27 27   BUN 15 18 23*   CREATININE 0.6 0.5* 0.6   GLUCOSE 112* 119* 119*   CALCIUM 9.3 9.2 9.2 cardiac  PPX: heparin  DISPO: ip, CABG/NATALIE clip on Tuesday (Dr. Joe Wiseman)    Giulia Pittman DO, PGY-1  09/21/20  9:34 AM    This patient will be staffed and discussed with Yuni Savage MD.     Patient seen and examined, labs and imaging studies reviewed, agree with assessment and plan as outlined above. Continue with care please call with questions.      MD Octavio England Ahr

## 2020-09-21 NOTE — CARE COORDINATION
Case Management Assessment           Daily Note                 Date/ Time of Note: 9/21/2020 12:49 PM         Note completed by: Shailesh Hill    Patient Name: Franky Corona  YOB: 1960    Diagnosis:NSTEMI (non-ST elevated myocardial infarction) [facility]) [I21.4]  NSTEMI (non-ST elevated myocardial infarction) [facility]) [I21.4]  Patient Admission Status: Inpatient    Date of Admission:9/16/2020 12:06 AM Length of Stay: 5 GLOS:      Current Plan of Care: CABG 09/22/20 w/ Dr Joanne Dugan  ________________________________________________________________________________________  PT AM-PAC:   / 24 per last evaluation on: not ordered    OT AM-PAC:   / 24 per last evaluation on: not ordered    DME Needs for discharge:   ________________________________________________________________________________________  Discharge Plan: Home with [address] [place] [address] Way: [facility] following    Tentative discharge date: TBD    Current barriers to discharge: Scheduled CABG 09/22/20     Referrals completed: Not Applicable    Resources/ information provided: Not indicated at this time  ________________________________________________________________________________________  Case Management Notes:  continues to follow for discharge planning and needs. Patient to have CABG with Dr Joanne Dugan 09/22/20. [facility] is following for possible HHC at [place] post CABG. CM will continue to follow and re-assess post CABG for new CM needs. Nilam and her family were provided with choice of provider; she and her family are in agreement with the discharge plan.     Care Transition Patient: Nereida Hill RN  [facility]  Case Management Department  Ph: [phone]  Fax: [phone]

## 2020-09-21 NOTE — PROGRESS NOTES
CTVS Note    Pre op   Plavix washout (last administered 9/17)  CABG/ NATALIE clip on Tuesday with Dr. Mark Gama  No new complaints, Lexy Schaefer MD  Cardiothoracic Surgery

## 2020-09-21 NOTE — PLAN OF CARE
Problem: Pain:  Goal: Control of acute pain  Description: Control of acute pain  Outcome: Ongoing  Note: Pt reports pain in neck that she states is chronic, PRN pain medication admin and pt reports resolution of pain. Pt denies all other pain this shift. Problem: OXYGENATION/RESPIRATORY FUNCTION  Goal: Patient will achieve/maintain normal respiratory rate/effort  Description: Respiratory rate and effort will be within normal limits for the patient  Outcome: Ongoing  Note: Pt SpO2 sats remain > 90% on room air, pt denies dyspnea or SOB. RR even and unlabored, lung sounds clear bilaterally. Pt continues to use IS as per RT instructions. Problem: Bleeding:  Goal: Will show no signs and symptoms of excessive bleeding  Description: Will show no signs and symptoms of excessive bleeding  Outcome: Ongoing  Note: No s/s of bleeding noted.

## 2020-09-21 NOTE — PROGRESS NOTES
09/19/20  0429 09/20/20  0429 09/21/20  0425   WBC 7.0 6.5 6.1   HGB 13.0 12.6 12.7   HCT 39.6 37.5 38.9   MCV 84.7 83.4 85.3    249 242     BMP:   Recent Labs     09/19/20  0429 09/20/20  0429 09/21/20  0425    139 139   K 4.9 4.8 4.8    104 105   CO2 27 27 27   GLUCOSE 112* 119* 119*   BUN 15 18 23*   CREATININE 0.6 0.5* 0.6   CALCIUM 9.3 9.2 9.2   MG 2.00 2.00 2.10     Accucheck Glucoses:   Recent Labs     09/20/20  1133 09/20/20  1747 09/20/20  2044 09/20/20  2353 09/21/20  0718   POCGLU 141* 98 105* 121* 116*     Lab Results   Component Value Date    LABA1C 6.1 09/18/2020     PT/INR:   Lab Results   Component Value Date    INR 1.03 09/16/2020    PROTIME 12.0 09/16/2020     APTT:   Lab Results   Component Value Date    APTT 28.1 09/16/2020     LFT:   Lab Results   Component Value Date    ALKPHOS 82 09/18/2020    ALT 28 09/18/2020    AST 39 09/18/2020    PROT 5.9 09/18/2020    BILITOT 0.3 09/18/2020    BILIDIR <0.2 09/18/2020    LABALBU 3.6 09/18/2020     U/A:    Lab Results   Component Value Date    COLORU Yellow 09/17/2020    NITRU Negative 09/17/2020    GLUCOSEU Negative 09/17/2020    KETUA TRACE 09/17/2020    UROBILINOGEN 0.2 09/17/2020    BILIRUBINUR Negative 09/17/2020     COVID (Rapid) 9/16/2020: not detected    MRSA culture 9/17/2020: none    Cath 9/17/2020:  LV overall preserved EF 55%, lateral wall motion abnormality.  LM ok.  LAD 70-80% mid, serial 80% distal/apical lesions. Cx with lg OM with long 95% stenosis,  RCA dom 90% distal bend.          Echo 9/17/2020:   Mild concentric left ventricular hypertrophy. Normal left ventricular systolic function with an estimated ejection fraction of 60%-65%. Normal diastolic function. Trivial mitral regurgitation. Aortic sclerosis - mild aortic stenosis with a peak velocity of 2.39m/s and   a mean pressure gradient of 13mmHg. Trivial tricuspid regurgitation. The pulmonic valve is not well visualized.    Estimated pulmonary artery awake, alert and oriented x 3  CV:   Sinus  Pulm:  clear    PLAN:   Further discussed planned operation with patient and significant other in detail. All questions answered and agrees to surgery. She does not wish to participate in Multimodal pain study. CABG surgery and placement of LAAA clip tomorrow with Dr Keena Bustamante. Preop orders completed.       ROYER Fitzpatrick  [phone] pager  9/21/2020  9:46 AM

## 2020-09-22 ENCOUNTER — ANESTHESIA EVENT (OUTPATIENT)
Dept: OPERATING ROOM | Age: 60
DRG: 234 | End: 2020-09-22
Payer: COMMERCIAL

## 2020-09-22 ENCOUNTER — APPOINTMENT (OUTPATIENT)
Dept: GENERAL RADIOLOGY | Age: 60
DRG: 234 | End: 2020-09-22
Payer: COMMERCIAL

## 2020-09-22 ENCOUNTER — ANESTHESIA (OUTPATIENT)
Dept: OPERATING ROOM | Age: 60
DRG: 234 | End: 2020-09-22
Payer: COMMERCIAL

## 2020-09-22 VITALS — OXYGEN SATURATION: 100 % | TEMPERATURE: 99.3 F | SYSTOLIC BLOOD PRESSURE: 100 MMHG | DIASTOLIC BLOOD PRESSURE: 53 MMHG

## 2020-09-22 LAB
ACTIVATED CLOTTING TIME: 118 SEC (ref 99–130)
ACTIVATED CLOTTING TIME: 388 SEC (ref 99–130)
ACTIVATED CLOTTING TIME: 401 SEC (ref 99–130)
ACTIVATED CLOTTING TIME: 407 SEC (ref 99–130)
ACTIVATED CLOTTING TIME: 412 SEC (ref 99–130)
ACTIVATED CLOTTING TIME: 437 SEC (ref 99–130)
ACTIVATED CLOTTING TIME: 499 SEC (ref 99–130)
ACTIVATED CLOTTING TIME: 97 SEC (ref 99–130)
ANION GAP SERPL CALCULATED.3IONS-SCNC: 7 MMOL/L (ref 3–16)
ANION GAP SERPL CALCULATED.3IONS-SCNC: 8 MMOL/L (ref 3–16)
ANTI-XA UNFRAC HEPARIN: 0.4 IU/ML (ref 0.3–0.7)
APTT: 25 SEC (ref 24.2–36.2)
BASE EXCESS ARTERIAL: -1 (ref -3–3)
BASE EXCESS ARTERIAL: -2 (ref -3–3)
BASE EXCESS ARTERIAL: -3 (ref -3–3)
BASE EXCESS ARTERIAL: -3 (ref -3–3)
BASE EXCESS ARTERIAL: -4 (ref -3–3)
BASE EXCESS ARTERIAL: -4 (ref -3–3)
BASE EXCESS ARTERIAL: -7 (ref -3–3)
BASE EXCESS ARTERIAL: 1 (ref -3–3)
BASE EXCESS ARTERIAL: 1 (ref -3–3)
BUN BLDV-MCNC: 15 MG/DL (ref 7–20)
BUN BLDV-MCNC: 20 MG/DL (ref 7–20)
CALCIUM IONIZED: 1.07 MMOL/L (ref 1.12–1.32)
CALCIUM IONIZED: 1.08 MMOL/L (ref 1.12–1.32)
CALCIUM IONIZED: 1.11 MMOL/L (ref 1.12–1.32)
CALCIUM IONIZED: 1.13 MMOL/L (ref 1.12–1.32)
CALCIUM IONIZED: 1.15 MMOL/L (ref 1.12–1.32)
CALCIUM IONIZED: 1.16 MMOL/L (ref 1.12–1.32)
CALCIUM IONIZED: 1.18 MMOL/L (ref 1.12–1.32)
CALCIUM IONIZED: 1.19 MMOL/L (ref 1.12–1.32)
CALCIUM IONIZED: 1.22 MMOL/L (ref 1.12–1.32)
CALCIUM IONIZED: 1.23 MMOL/L (ref 1.12–1.32)
CALCIUM IONIZED: 1.26 MMOL/L (ref 1.12–1.32)
CALCIUM SERPL-MCNC: 8.3 MG/DL (ref 8.3–10.6)
CALCIUM SERPL-MCNC: 9.5 MG/DL (ref 8.3–10.6)
CHLORIDE BLD-SCNC: 104 MMOL/L (ref 99–110)
CHLORIDE BLD-SCNC: 109 MMOL/L (ref 99–110)
CO2: 22 MMOL/L (ref 21–32)
CO2: 28 MMOL/L (ref 21–32)
CREAT SERPL-MCNC: 0.5 MG/DL (ref 0.6–1.2)
CREAT SERPL-MCNC: 0.6 MG/DL (ref 0.6–1.2)
GFR AFRICAN AMERICAN: >60
GFR AFRICAN AMERICAN: >60
GFR NON-AFRICAN AMERICAN: >60
GFR NON-AFRICAN AMERICAN: >60
GLUCOSE BLD-MCNC: 103 MG/DL (ref 70–99)
GLUCOSE BLD-MCNC: 105 MG/DL (ref 70–99)
GLUCOSE BLD-MCNC: 106 MG/DL (ref 70–99)
GLUCOSE BLD-MCNC: 117 MG/DL (ref 70–99)
GLUCOSE BLD-MCNC: 119 MG/DL (ref 70–99)
GLUCOSE BLD-MCNC: 119 MG/DL (ref 70–99)
GLUCOSE BLD-MCNC: 122 MG/DL (ref 70–99)
GLUCOSE BLD-MCNC: 126 MG/DL (ref 70–99)
GLUCOSE BLD-MCNC: 135 MG/DL (ref 70–99)
GLUCOSE BLD-MCNC: 136 MG/DL (ref 70–99)
GLUCOSE BLD-MCNC: 141 MG/DL (ref 70–99)
GLUCOSE BLD-MCNC: 141 MG/DL (ref 70–99)
GLUCOSE BLD-MCNC: 142 MG/DL (ref 70–99)
GLUCOSE BLD-MCNC: 143 MG/DL (ref 70–99)
GLUCOSE BLD-MCNC: 154 MG/DL (ref 70–99)
GLUCOSE BLD-MCNC: 163 MG/DL (ref 70–99)
GLUCOSE BLD-MCNC: 164 MG/DL (ref 70–99)
GLUCOSE BLD-MCNC: 176 MG/DL (ref 70–99)
GLUCOSE BLD-MCNC: 98 MG/DL (ref 70–99)
HCO3 ARTERIAL: 20.3 MMOL/L (ref 21–29)
HCO3 ARTERIAL: 22.7 MMOL/L (ref 21–29)
HCO3 ARTERIAL: 22.9 MMOL/L (ref 21–29)
HCO3 ARTERIAL: 23 MMOL/L (ref 21–29)
HCO3 ARTERIAL: 23.2 MMOL/L (ref 21–29)
HCO3 ARTERIAL: 23.4 MMOL/L (ref 21–29)
HCO3 ARTERIAL: 23.7 MMOL/L (ref 21–29)
HCO3 ARTERIAL: 23.9 MMOL/L (ref 21–29)
HCO3 ARTERIAL: 24 MMOL/L (ref 21–29)
HCO3 ARTERIAL: 25 MMOL/L (ref 21–29)
HCO3 ARTERIAL: 26.1 MMOL/L (ref 21–29)
HCT VFR BLD CALC: 31 % (ref 36–48)
HCT VFR BLD CALC: 38.7 % (ref 36–48)
HEMOGLOBIN: 10.5 G/DL (ref 12–16)
HEMOGLOBIN: 12.9 G/DL (ref 12–16)
INR BLD: 1.06 (ref 0.86–1.14)
LACTATE: 1.24 MMOL/L (ref 0.4–2)
LACTATE: 1.38 MMOL/L (ref 0.4–2)
LACTATE: 1.92 MMOL/L (ref 0.4–2)
LACTATE: 2 MMOL/L (ref 0.4–2)
LACTATE: 2.01 MMOL/L (ref 0.4–2)
MAGNESIUM: 2 MG/DL (ref 1.8–2.4)
MAGNESIUM: 3 MG/DL (ref 1.8–2.4)
MCH RBC QN AUTO: 28.2 PG (ref 26–34)
MCH RBC QN AUTO: 28.2 PG (ref 26–34)
MCHC RBC AUTO-ENTMCNC: 33.4 G/DL (ref 31–36)
MCHC RBC AUTO-ENTMCNC: 33.7 G/DL (ref 31–36)
MCV RBC AUTO: 83.5 FL (ref 80–100)
MCV RBC AUTO: 84.3 FL (ref 80–100)
O2 SAT, ARTERIAL: 100 % (ref 93–100)
O2 SAT, ARTERIAL: 97 % (ref 93–100)
O2 SAT, ARTERIAL: 97 % (ref 93–100)
O2 SAT, ARTERIAL: 98 % (ref 93–100)
PCO2 ARTERIAL: 37.4 MM HG (ref 35–45)
PCO2 ARTERIAL: 37.9 MM HG (ref 35–45)
PCO2 ARTERIAL: 40.5 MM HG (ref 35–45)
PCO2 ARTERIAL: 43.4 MM HG (ref 35–45)
PCO2 ARTERIAL: 44.7 MM HG (ref 35–45)
PCO2 ARTERIAL: 45.2 MM HG (ref 35–45)
PCO2 ARTERIAL: 46.2 MM HG (ref 35–45)
PCO2 ARTERIAL: 47.9 MM HG (ref 35–45)
PCO2 ARTERIAL: 49 MM HG (ref 35–45)
PCO2 ARTERIAL: 49.5 MM HG (ref 35–45)
PCO2 ARTERIAL: 50 MM HG (ref 35–45)
PDW BLD-RTO: 13.3 % (ref 12.4–15.4)
PDW BLD-RTO: 13.8 % (ref 12.4–15.4)
PERFORMED ON: ABNORMAL
PH ARTERIAL: 7.24 (ref 7.35–7.45)
PH ARTERIAL: 7.27 (ref 7.35–7.45)
PH ARTERIAL: 7.27 (ref 7.35–7.45)
PH ARTERIAL: 7.29 (ref 7.35–7.45)
PH ARTERIAL: 7.32 (ref 7.35–7.45)
PH ARTERIAL: 7.33 (ref 7.35–7.45)
PH ARTERIAL: 7.34 (ref 7.35–7.45)
PH ARTERIAL: 7.36 (ref 7.35–7.45)
PH ARTERIAL: 7.38 (ref 7.35–7.45)
PH ARTERIAL: 7.39 (ref 7.35–7.45)
PH ARTERIAL: 7.43 (ref 7.35–7.45)
PLATELET # BLD: 146 K/UL (ref 135–450)
PLATELET # BLD: 258 K/UL (ref 135–450)
PMV BLD AUTO: 8.8 FL (ref 5–10.5)
PMV BLD AUTO: 8.9 FL (ref 5–10.5)
PO2 ARTERIAL: 106.5 MM HG (ref 75–108)
PO2 ARTERIAL: 106.6 MM HG (ref 75–108)
PO2 ARTERIAL: 113 MM HG (ref 75–108)
PO2 ARTERIAL: 201.7 MM HG (ref 75–108)
PO2 ARTERIAL: 213.5 MM HG (ref 75–108)
PO2 ARTERIAL: 322.3 MM HG (ref 75–108)
PO2 ARTERIAL: 383.6 MM HG (ref 75–108)
PO2 ARTERIAL: 397 MM HG (ref 75–108)
PO2 ARTERIAL: 408.7 MM HG (ref 75–108)
PO2 ARTERIAL: 498.1 MM HG (ref 75–108)
PO2 ARTERIAL: 549.2 MM HG (ref 75–108)
POC POTASSIUM: 4 MMOL/L (ref 3.5–5.1)
POC POTASSIUM: 4.1 MMOL/L (ref 3.5–5.1)
POC POTASSIUM: 4.3 MMOL/L (ref 3.5–5.1)
POC POTASSIUM: 4.4 MMOL/L (ref 3.5–5.1)
POC POTASSIUM: 4.5 MMOL/L (ref 3.5–5.1)
POC POTASSIUM: 4.5 MMOL/L (ref 3.5–5.1)
POC POTASSIUM: 4.7 MMOL/L (ref 3.5–5.1)
POC POTASSIUM: 4.9 MMOL/L (ref 3.5–5.1)
POC POTASSIUM: 5.4 MMOL/L (ref 3.5–5.1)
POC POTASSIUM: 5.5 MMOL/L (ref 3.5–5.1)
POC POTASSIUM: 5.7 MMOL/L (ref 3.5–5.1)
POC SAMPLE TYPE: ABNORMAL
POC SODIUM: 134 MMOL/L (ref 136–145)
POC SODIUM: 137 MMOL/L (ref 136–145)
POC SODIUM: 137 MMOL/L (ref 136–145)
POC SODIUM: 139 MMOL/L (ref 136–145)
POC SODIUM: 140 MMOL/L (ref 136–145)
POC SODIUM: 141 MMOL/L (ref 136–145)
POC SODIUM: 143 MMOL/L (ref 136–145)
POTASSIUM SERPL-SCNC: 4.5 MMOL/L (ref 3.5–5.1)
POTASSIUM SERPL-SCNC: 4.9 MMOL/L (ref 3.5–5.1)
PROTHROMBIN TIME: 12.3 SEC (ref 10–13.2)
RBC # BLD: 3.71 M/UL (ref 4–5.2)
RBC # BLD: 4.59 M/UL (ref 4–5.2)
SODIUM BLD-SCNC: 139 MMOL/L (ref 136–145)
SODIUM BLD-SCNC: 139 MMOL/L (ref 136–145)
TCO2 ARTERIAL: 22 MMOL/L
TCO2 ARTERIAL: 24 MMOL/L
TCO2 ARTERIAL: 24 MMOL/L
TCO2 ARTERIAL: 25 MMOL/L
TCO2 ARTERIAL: 26 MMOL/L
TCO2 ARTERIAL: 28 MMOL/L
WBC # BLD: 12.9 K/UL (ref 4–11)
WBC # BLD: 5.9 K/UL (ref 4–11)

## 2020-09-22 PROCEDURE — 85018 HEMOGLOBIN: CPT

## 2020-09-22 PROCEDURE — 84132 ASSAY OF SERUM POTASSIUM: CPT

## 2020-09-22 PROCEDURE — 83605 ASSAY OF LACTIC ACID: CPT

## 2020-09-22 PROCEDURE — 94750 HC PULMONARY COMPLIANCE STUDY: CPT

## 2020-09-22 PROCEDURE — 99999 PR OFFICE/OUTPT VISIT,PROCEDURE ONLY: CPT | Performed by: PHYSICIAN ASSISTANT

## 2020-09-22 PROCEDURE — 36415 COLL VENOUS BLD VENIPUNCTURE: CPT

## 2020-09-22 PROCEDURE — 6360000002 HC RX W HCPCS: Performed by: ANESTHESIOLOGY

## 2020-09-22 PROCEDURE — 2580000003 HC RX 258: Performed by: ANESTHESIOLOGY

## 2020-09-22 PROCEDURE — P9045 ALBUMIN (HUMAN), 5%, 250 ML: HCPCS | Performed by: THORACIC SURGERY (CARDIOTHORACIC VASCULAR SURGERY)

## 2020-09-22 PROCEDURE — 88304 TISSUE EXAM BY PATHOLOGIST: CPT

## 2020-09-22 PROCEDURE — 84295 ASSAY OF SERUM SODIUM: CPT

## 2020-09-22 PROCEDURE — 02100Z9 BYPASS CORONARY ARTERY, ONE ARTERY FROM LEFT INTERNAL MAMMARY, OPEN APPROACH: ICD-10-PCS | Performed by: THORACIC SURGERY (CARDIOTHORACIC VASCULAR SURGERY)

## 2020-09-22 PROCEDURE — P9045 ALBUMIN (HUMAN), 5%, 250 ML: HCPCS | Performed by: ANESTHESIOLOGY

## 2020-09-22 PROCEDURE — 06BQ4ZZ EXCISION OF LEFT SAPHENOUS VEIN, PERCUTANEOUS ENDOSCOPIC APPROACH: ICD-10-PCS | Performed by: THORACIC SURGERY (CARDIOTHORACIC VASCULAR SURGERY)

## 2020-09-22 PROCEDURE — 3E0T3BZ INTRODUCTION OF ANESTHETIC AGENT INTO PERIPHERAL NERVES AND PLEXI, PERCUTANEOUS APPROACH: ICD-10-PCS | Performed by: ANESTHESIOLOGY

## 2020-09-22 PROCEDURE — 94002 VENT MGMT INPAT INIT DAY: CPT

## 2020-09-22 PROCEDURE — 3600000008 HC SURGERY OHS BASE: Performed by: THORACIC SURGERY (CARDIOTHORACIC VASCULAR SURGERY)

## 2020-09-22 PROCEDURE — 7100000010 HC PHASE II RECOVERY - FIRST 15 MIN

## 2020-09-22 PROCEDURE — 33572 OPEN CORONARY ENDARTERECTOMY: CPT | Performed by: THORACIC SURGERY (CARDIOTHORACIC VASCULAR SURGERY)

## 2020-09-22 PROCEDURE — 2100000000 HC CCU R&B

## 2020-09-22 PROCEDURE — 6360000002 HC RX W HCPCS: Performed by: THORACIC SURGERY (CARDIOTHORACIC VASCULAR SURGERY)

## 2020-09-22 PROCEDURE — 6370000000 HC RX 637 (ALT 250 FOR IP): Performed by: CLINICAL NURSE SPECIALIST

## 2020-09-22 PROCEDURE — 02L70CK OCCLUSION OF LEFT ATRIAL APPENDAGE WITH EXTRALUMINAL DEVICE, OPEN APPROACH: ICD-10-PCS | Performed by: THORACIC SURGERY (CARDIOTHORACIC VASCULAR SURGERY)

## 2020-09-22 PROCEDURE — 2580000003 HC RX 258: Performed by: CLINICAL NURSE SPECIALIST

## 2020-09-22 PROCEDURE — 85730 THROMBOPLASTIN TIME PARTIAL: CPT

## 2020-09-22 PROCEDURE — 5A1221Z PERFORMANCE OF CARDIAC OUTPUT, CONTINUOUS: ICD-10-PCS | Performed by: THORACIC SURGERY (CARDIOTHORACIC VASCULAR SURGERY)

## 2020-09-22 PROCEDURE — 85610 PROTHROMBIN TIME: CPT

## 2020-09-22 PROCEDURE — 3600000018 HC SURGERY OHS ADDTL 15MIN: Performed by: THORACIC SURGERY (CARDIOTHORACIC VASCULAR SURGERY)

## 2020-09-22 PROCEDURE — 2580000003 HC RX 258: Performed by: THORACIC SURGERY (CARDIOTHORACIC VASCULAR SURGERY)

## 2020-09-22 PROCEDURE — 94770 HC ETCO2 MONITOR DAILY: CPT

## 2020-09-22 PROCEDURE — 80048 BASIC METABOLIC PNL TOTAL CA: CPT

## 2020-09-22 PROCEDURE — 2709999900 HC NON-CHARGEABLE SUPPLY: Performed by: THORACIC SURGERY (CARDIOTHORACIC VASCULAR SURGERY)

## 2020-09-22 PROCEDURE — 2720000010 HC SURG SUPPLY STERILE: Performed by: THORACIC SURGERY (CARDIOTHORACIC VASCULAR SURGERY)

## 2020-09-22 PROCEDURE — 6370000000 HC RX 637 (ALT 250 FOR IP): Performed by: STUDENT IN AN ORGANIZED HEALTH CARE EDUCATION/TRAINING PROGRAM

## 2020-09-22 PROCEDURE — 82330 ASSAY OF CALCIUM: CPT

## 2020-09-22 PROCEDURE — 2500000003 HC RX 250 WO HCPCS: Performed by: ANESTHESIOLOGY

## 2020-09-22 PROCEDURE — 7100000011 HC PHASE II RECOVERY - ADDTL 15 MIN

## 2020-09-22 PROCEDURE — 85520 HEPARIN ASSAY: CPT

## 2020-09-22 PROCEDURE — 6360000002 HC RX W HCPCS: Performed by: CLINICAL NURSE SPECIALIST

## 2020-09-22 PROCEDURE — 021109W BYPASS CORONARY ARTERY, TWO ARTERIES FROM AORTA WITH AUTOLOGOUS VENOUS TISSUE, OPEN APPROACH: ICD-10-PCS | Performed by: THORACIC SURGERY (CARDIOTHORACIC VASCULAR SURGERY)

## 2020-09-22 PROCEDURE — 76942 ECHO GUIDE FOR BIOPSY: CPT | Performed by: ANESTHESIOLOGY

## 2020-09-22 PROCEDURE — 85027 COMPLETE CBC AUTOMATED: CPT

## 2020-09-22 PROCEDURE — 71045 X-RAY EXAM CHEST 1 VIEW: CPT

## 2020-09-22 PROCEDURE — 6360000002 HC RX W HCPCS: Performed by: STUDENT IN AN ORGANIZED HEALTH CARE EDUCATION/TRAINING PROGRAM

## 2020-09-22 PROCEDURE — C9290 INJ, BUPIVACAINE LIPOSOME: HCPCS | Performed by: ANESTHESIOLOGY

## 2020-09-22 PROCEDURE — 85347 COAGULATION TIME ACTIVATED: CPT

## 2020-09-22 PROCEDURE — 3700000001 HC ADD 15 MINUTES (ANESTHESIA): Performed by: THORACIC SURGERY (CARDIOTHORACIC VASCULAR SURGERY)

## 2020-09-22 PROCEDURE — 3700000000 HC ANESTHESIA ATTENDED CARE: Performed by: THORACIC SURGERY (CARDIOTHORACIC VASCULAR SURGERY)

## 2020-09-22 PROCEDURE — 82803 BLOOD GASES ANY COMBINATION: CPT

## 2020-09-22 PROCEDURE — 82947 ASSAY GLUCOSE BLOOD QUANT: CPT

## 2020-09-22 PROCEDURE — 2500000003 HC RX 250 WO HCPCS: Performed by: THORACIC SURGERY (CARDIOTHORACIC VASCULAR SURGERY)

## 2020-09-22 PROCEDURE — C1729 CATH, DRAINAGE: HCPCS | Performed by: THORACIC SURGERY (CARDIOTHORACIC VASCULAR SURGERY)

## 2020-09-22 PROCEDURE — 2700000000 HC OXYGEN THERAPY PER DAY

## 2020-09-22 PROCEDURE — 33508 ENDOSCOPIC VEIN HARVEST: CPT | Performed by: THORACIC SURGERY (CARDIOTHORACIC VASCULAR SURGERY)

## 2020-09-22 PROCEDURE — 37799 UNLISTED PX VASCULAR SURGERY: CPT

## 2020-09-22 PROCEDURE — 33533 CABG ARTERIAL SINGLE: CPT | Performed by: THORACIC SURGERY (CARDIOTHORACIC VASCULAR SURGERY)

## 2020-09-22 PROCEDURE — 6370000000 HC RX 637 (ALT 250 FOR IP): Performed by: THORACIC SURGERY (CARDIOTHORACIC VASCULAR SURGERY)

## 2020-09-22 PROCEDURE — 33518 CABG ARTERY-VEIN TWO: CPT | Performed by: THORACIC SURGERY (CARDIOTHORACIC VASCULAR SURGERY)

## 2020-09-22 PROCEDURE — 36592 COLLECT BLOOD FROM PICC: CPT

## 2020-09-22 PROCEDURE — 83735 ASSAY OF MAGNESIUM: CPT

## 2020-09-22 PROCEDURE — 94761 N-INVAS EAR/PLS OXIMETRY MLT: CPT

## 2020-09-22 PROCEDURE — 6370000000 HC RX 637 (ALT 250 FOR IP): Performed by: ANESTHESIOLOGY

## 2020-09-22 PROCEDURE — 2780000010 HC IMPLANT OTHER: Performed by: THORACIC SURGERY (CARDIOTHORACIC VASCULAR SURGERY)

## 2020-09-22 PROCEDURE — B24BZZ4 ULTRASONOGRAPHY OF HEART WITH AORTA, TRANSESOPHAGEAL: ICD-10-PCS | Performed by: ANESTHESIOLOGY

## 2020-09-22 DEVICE — ZINACTIVE USE 2540325 DEVICE OCCL CLP L40MM SM FOOTPRINT 1 HND APPL SUTURELESS W/: Type: IMPLANTABLE DEVICE | Site: HEART | Status: FUNCTIONAL

## 2020-09-22 RX ORDER — SODIUM CHLORIDE 9 MG/ML
INJECTION, SOLUTION INTRAVENOUS CONTINUOUS PRN
Status: DISCONTINUED | OUTPATIENT
Start: 2020-09-22 | End: 2020-09-22 | Stop reason: SDUPTHER

## 2020-09-22 RX ORDER — LANOLIN ALCOHOL/MO/W.PET/CERES
400 CREAM (GRAM) TOPICAL 2 TIMES DAILY
Status: DISCONTINUED | OUTPATIENT
Start: 2020-09-23 | End: 2020-09-26 | Stop reason: HOSPADM

## 2020-09-22 RX ORDER — FENTANYL CITRATE 50 UG/ML
25 INJECTION, SOLUTION INTRAMUSCULAR; INTRAVENOUS
Status: DISCONTINUED | OUTPATIENT
Start: 2020-09-22 | End: 2020-09-26 | Stop reason: HOSPADM

## 2020-09-22 RX ORDER — MIDAZOLAM HYDROCHLORIDE 1 MG/ML
1 INJECTION INTRAMUSCULAR; INTRAVENOUS
Status: DISCONTINUED | OUTPATIENT
Start: 2020-09-22 | End: 2020-09-23

## 2020-09-22 RX ORDER — PANTOPRAZOLE SODIUM 40 MG/10ML
40 INJECTION, POWDER, LYOPHILIZED, FOR SOLUTION INTRAVENOUS DAILY
Status: COMPLETED | OUTPATIENT
Start: 2020-09-23 | End: 2020-09-23

## 2020-09-22 RX ORDER — PROTAMINE SULFATE 10 MG/ML
50 INJECTION, SOLUTION INTRAVENOUS
Status: ACTIVE | OUTPATIENT
Start: 2020-09-22 | End: 2020-09-22

## 2020-09-22 RX ORDER — SODIUM CHLORIDE 0.9 % (FLUSH) 0.9 %
10 SYRINGE (ML) INJECTION EVERY 12 HOURS SCHEDULED
Status: DISCONTINUED | OUTPATIENT
Start: 2020-09-22 | End: 2020-09-26 | Stop reason: HOSPADM

## 2020-09-22 RX ORDER — KETOROLAC TROMETHAMINE 30 MG/ML
15 INJECTION, SOLUTION INTRAMUSCULAR; INTRAVENOUS EVERY 6 HOURS
Status: DISCONTINUED | OUTPATIENT
Start: 2020-09-23 | End: 2020-09-25

## 2020-09-22 RX ORDER — FENTANYL CITRATE 0.05 MG/ML
INJECTION, SOLUTION INTRAMUSCULAR; INTRAVENOUS PRN
Status: DISCONTINUED | OUTPATIENT
Start: 2020-09-22 | End: 2020-09-22

## 2020-09-22 RX ORDER — POLYETHYLENE GLYCOL 3350 17 G/17G
17 POWDER, FOR SOLUTION ORAL DAILY
Status: DISCONTINUED | OUTPATIENT
Start: 2020-09-22 | End: 2020-09-26 | Stop reason: HOSPADM

## 2020-09-22 RX ORDER — BUPIVACAINE HYDROCHLORIDE 5 MG/ML
INJECTION, SOLUTION EPIDURAL; INTRACAUDAL
Status: COMPLETED | OUTPATIENT
Start: 2020-09-22 | End: 2020-09-22

## 2020-09-22 RX ORDER — CLOPIDOGREL BISULFATE 75 MG/1
75 TABLET ORAL DAILY
Status: DISCONTINUED | OUTPATIENT
Start: 2020-09-23 | End: 2020-09-26 | Stop reason: HOSPADM

## 2020-09-22 RX ORDER — DIPHENHYDRAMINE HCL 25 MG
25 TABLET ORAL NIGHTLY PRN
Status: DISCONTINUED | OUTPATIENT
Start: 2020-09-23 | End: 2020-09-26 | Stop reason: HOSPADM

## 2020-09-22 RX ORDER — AMINOCAPROIC ACID 250 MG/ML
INJECTION, SOLUTION INTRAVENOUS PRN
Status: DISCONTINUED | OUTPATIENT
Start: 2020-09-22 | End: 2020-09-22 | Stop reason: SDUPTHER

## 2020-09-22 RX ORDER — NITROGLYCERIN 20 MG/100ML
10 INJECTION INTRAVENOUS CONTINUOUS PRN
Status: DISCONTINUED | OUTPATIENT
Start: 2020-09-22 | End: 2020-09-23

## 2020-09-22 RX ORDER — POTASSIUM CHLORIDE 29.8 MG/ML
20 INJECTION INTRAVENOUS PRN
Status: ACTIVE | OUTPATIENT
Start: 2020-09-22 | End: 2020-09-24

## 2020-09-22 RX ORDER — ACETAMINOPHEN 325 MG/1
650 TABLET ORAL EVERY 4 HOURS PRN
Status: DISCONTINUED | OUTPATIENT
Start: 2020-09-22 | End: 2020-09-26 | Stop reason: HOSPADM

## 2020-09-22 RX ORDER — CHLORHEXIDINE GLUCONATE 0.12 MG/ML
15 RINSE ORAL 2 TIMES DAILY
Status: DISCONTINUED | OUTPATIENT
Start: 2020-09-22 | End: 2020-09-26 | Stop reason: HOSPADM

## 2020-09-22 RX ORDER — POTASSIUM CHLORIDE 750 MG/1
10 TABLET, EXTENDED RELEASE ORAL
Status: DISCONTINUED | OUTPATIENT
Start: 2020-09-23 | End: 2020-09-25

## 2020-09-22 RX ORDER — ALBUMIN, HUMAN INJ 5% 5 %
25 SOLUTION INTRAVENOUS PRN
Status: DISCONTINUED | OUTPATIENT
Start: 2020-09-22 | End: 2020-09-23

## 2020-09-22 RX ORDER — HEPARIN SODIUM 10000 [USP'U]/ML
INJECTION, SOLUTION INTRAVENOUS; SUBCUTANEOUS PRN
Status: DISCONTINUED | OUTPATIENT
Start: 2020-09-22 | End: 2020-09-22 | Stop reason: SDUPTHER

## 2020-09-22 RX ORDER — DEXTROSE MONOHYDRATE 25 G/50ML
12.5 INJECTION, SOLUTION INTRAVENOUS PRN
Status: DISCONTINUED | OUTPATIENT
Start: 2020-09-22 | End: 2020-09-26 | Stop reason: HOSPADM

## 2020-09-22 RX ORDER — DEXTROSE MONOHYDRATE 50 MG/ML
100 INJECTION, SOLUTION INTRAVENOUS PRN
Status: DISCONTINUED | OUTPATIENT
Start: 2020-09-22 | End: 2020-09-26 | Stop reason: HOSPADM

## 2020-09-22 RX ORDER — ALBUTEROL SULFATE 90 UG/1
2 AEROSOL, METERED RESPIRATORY (INHALATION) EVERY 6 HOURS PRN
Status: DISCONTINUED | OUTPATIENT
Start: 2020-09-22 | End: 2020-09-22 | Stop reason: ALTCHOICE

## 2020-09-22 RX ORDER — METOPROLOL TARTRATE 5 MG/5ML
2.5 INJECTION INTRAVENOUS EVERY 10 MIN PRN
Status: DISCONTINUED | OUTPATIENT
Start: 2020-09-22 | End: 2020-09-26 | Stop reason: HOSPADM

## 2020-09-22 RX ORDER — ONDANSETRON 2 MG/ML
4 INJECTION INTRAMUSCULAR; INTRAVENOUS EVERY 8 HOURS PRN
Status: DISCONTINUED | OUTPATIENT
Start: 2020-09-22 | End: 2020-09-26 | Stop reason: HOSPADM

## 2020-09-22 RX ORDER — FUROSEMIDE 10 MG/ML
40 INJECTION INTRAMUSCULAR; INTRAVENOUS
Status: ACTIVE | OUTPATIENT
Start: 2020-09-22 | End: 2020-09-22

## 2020-09-22 RX ORDER — ETOMIDATE 2 MG/ML
INJECTION INTRAVENOUS PRN
Status: DISCONTINUED | OUTPATIENT
Start: 2020-09-22 | End: 2020-09-22 | Stop reason: SDUPTHER

## 2020-09-22 RX ORDER — PROPOFOL 10 MG/ML
INJECTION, EMULSION INTRAVENOUS PRN
Status: DISCONTINUED | OUTPATIENT
Start: 2020-09-22 | End: 2020-09-22 | Stop reason: SDUPTHER

## 2020-09-22 RX ORDER — PANTOPRAZOLE SODIUM 40 MG/1
40 TABLET, DELAYED RELEASE ORAL DAILY
Status: DISCONTINUED | OUTPATIENT
Start: 2020-09-24 | End: 2020-09-26 | Stop reason: HOSPADM

## 2020-09-22 RX ORDER — DOPAMINE HYDROCHLORIDE 160 MG/100ML
2 INJECTION, SOLUTION INTRAVENOUS CONTINUOUS PRN
Status: DISCONTINUED | OUTPATIENT
Start: 2020-09-22 | End: 2020-09-26 | Stop reason: HOSPADM

## 2020-09-22 RX ORDER — MEPERIDINE HYDROCHLORIDE 25 MG/ML
25 INJECTION INTRAMUSCULAR; INTRAVENOUS; SUBCUTANEOUS
Status: ACTIVE | OUTPATIENT
Start: 2020-09-22 | End: 2020-09-22

## 2020-09-22 RX ORDER — SUCCINYLCHOLINE/SOD CL,ISO/PF 100 MG/5ML
SYRINGE (ML) INTRAVENOUS PRN
Status: DISCONTINUED | OUTPATIENT
Start: 2020-09-22 | End: 2020-09-22 | Stop reason: SDUPTHER

## 2020-09-22 RX ORDER — 0.9 % SODIUM CHLORIDE 0.9 %
1000 INTRAVENOUS SOLUTION INTRAVENOUS CONTINUOUS PRN
Status: DISCONTINUED | OUTPATIENT
Start: 2020-09-22 | End: 2020-09-23

## 2020-09-22 RX ORDER — KETOROLAC TROMETHAMINE 30 MG/ML
30 INJECTION, SOLUTION INTRAMUSCULAR; INTRAVENOUS ONCE
Status: COMPLETED | OUTPATIENT
Start: 2020-09-22 | End: 2020-09-22

## 2020-09-22 RX ORDER — OXYCODONE HYDROCHLORIDE AND ACETAMINOPHEN 5; 325 MG/1; MG/1
1 TABLET ORAL EVERY 4 HOURS PRN
Status: DISCONTINUED | OUTPATIENT
Start: 2020-09-22 | End: 2020-09-26 | Stop reason: HOSPADM

## 2020-09-22 RX ORDER — INSULIN LISPRO 100 [IU]/ML
0-12 INJECTION, SOLUTION INTRAVENOUS; SUBCUTANEOUS EVERY 4 HOURS
Status: DISCONTINUED | OUTPATIENT
Start: 2020-09-23 | End: 2020-09-25

## 2020-09-22 RX ORDER — MILRINONE LACTATE 0.2 MG/ML
0.38 INJECTION, SOLUTION INTRAVENOUS CONTINUOUS PRN
Status: DISCONTINUED | OUTPATIENT
Start: 2020-09-22 | End: 2020-09-23

## 2020-09-22 RX ORDER — MAGNESIUM SULFATE IN WATER 40 MG/ML
2 INJECTION, SOLUTION INTRAVENOUS PRN
Status: DISCONTINUED | OUTPATIENT
Start: 2020-09-22 | End: 2020-09-26 | Stop reason: HOSPADM

## 2020-09-22 RX ORDER — OXYCODONE HYDROCHLORIDE AND ACETAMINOPHEN 5; 325 MG/1; MG/1
2 TABLET ORAL EVERY 4 HOURS PRN
Status: DISCONTINUED | OUTPATIENT
Start: 2020-09-22 | End: 2020-09-26 | Stop reason: HOSPADM

## 2020-09-22 RX ORDER — MIDAZOLAM HYDROCHLORIDE 1 MG/ML
INJECTION INTRAMUSCULAR; INTRAVENOUS PRN
Status: DISCONTINUED | OUTPATIENT
Start: 2020-09-22 | End: 2020-09-22 | Stop reason: SDUPTHER

## 2020-09-22 RX ORDER — ALBUTEROL SULFATE 2.5 MG/3ML
2.5 SOLUTION RESPIRATORY (INHALATION) EVERY 6 HOURS PRN
Status: DISCONTINUED | OUTPATIENT
Start: 2020-09-22 | End: 2020-09-26 | Stop reason: HOSPADM

## 2020-09-22 RX ORDER — SODIUM CHLORIDE 9 MG/ML
10 INJECTION INTRAVENOUS DAILY
Status: DISCONTINUED | OUTPATIENT
Start: 2020-09-22 | End: 2020-09-23

## 2020-09-22 RX ORDER — HYDRALAZINE HYDROCHLORIDE 20 MG/ML
5 INJECTION INTRAMUSCULAR; INTRAVENOUS EVERY 5 MIN PRN
Status: DISCONTINUED | OUTPATIENT
Start: 2020-09-22 | End: 2020-09-26 | Stop reason: HOSPADM

## 2020-09-22 RX ORDER — SODIUM CHLORIDE 0.9 % (FLUSH) 0.9 %
10 SYRINGE (ML) INJECTION PRN
Status: DISCONTINUED | OUTPATIENT
Start: 2020-09-22 | End: 2020-09-26 | Stop reason: HOSPADM

## 2020-09-22 RX ORDER — METOCLOPRAMIDE HYDROCHLORIDE 5 MG/ML
10 INJECTION INTRAMUSCULAR; INTRAVENOUS EVERY 6 HOURS PRN
Status: DISCONTINUED | OUTPATIENT
Start: 2020-09-22 | End: 2020-09-26 | Stop reason: HOSPADM

## 2020-09-22 RX ORDER — ALBUMIN, HUMAN INJ 5% 5 %
SOLUTION INTRAVENOUS PRN
Status: DISCONTINUED | OUTPATIENT
Start: 2020-09-22 | End: 2020-09-22

## 2020-09-22 RX ORDER — NITROGLYCERIN 20 MG/100ML
INJECTION INTRAVENOUS CONTINUOUS PRN
Status: DISCONTINUED | OUTPATIENT
Start: 2020-09-22 | End: 2020-09-22 | Stop reason: SDUPTHER

## 2020-09-22 RX ORDER — ROCURONIUM BROMIDE 10 MG/ML
INJECTION, SOLUTION INTRAVENOUS PRN
Status: DISCONTINUED | OUTPATIENT
Start: 2020-09-22 | End: 2020-09-22 | Stop reason: SDUPTHER

## 2020-09-22 RX ORDER — NICOTINE POLACRILEX 4 MG
15 LOZENGE BUCCAL PRN
Status: DISCONTINUED | OUTPATIENT
Start: 2020-09-22 | End: 2020-09-26 | Stop reason: HOSPADM

## 2020-09-22 RX ORDER — MAGNESIUM HYDROXIDE 1200 MG/15ML
LIQUID ORAL CONTINUOUS PRN
Status: COMPLETED | OUTPATIENT
Start: 2020-09-22 | End: 2020-09-22

## 2020-09-22 RX ORDER — SODIUM CHLORIDE, SODIUM LACTATE, POTASSIUM CHLORIDE, CALCIUM CHLORIDE 600; 310; 30; 20 MG/100ML; MG/100ML; MG/100ML; MG/100ML
INJECTION, SOLUTION INTRAVENOUS CONTINUOUS
Status: DISCONTINUED | OUTPATIENT
Start: 2020-09-22 | End: 2020-09-23

## 2020-09-22 RX ADMIN — FENTANYL CITRATE 250 MCG: 50 INJECTION, SOLUTION INTRAMUSCULAR; INTRAVENOUS at 11:50

## 2020-09-22 RX ADMIN — SODIUM BICARBONATE 50 MEQ: 84 INJECTION, SOLUTION INTRAVENOUS at 15:42

## 2020-09-22 RX ADMIN — FENTANYL CITRATE 25 MCG: 50 INJECTION, SOLUTION INTRAMUSCULAR; INTRAVENOUS at 18:40

## 2020-09-22 RX ADMIN — FENTANYL CITRATE 25 MCG: 50 INJECTION, SOLUTION INTRAMUSCULAR; INTRAVENOUS at 16:16

## 2020-09-22 RX ADMIN — ATORVASTATIN CALCIUM 80 MG: 40 TABLET, FILM COATED ORAL at 19:50

## 2020-09-22 RX ADMIN — CEFAZOLIN 2 G: 10 INJECTION, POWDER, FOR SOLUTION INTRAVENOUS at 09:00

## 2020-09-22 RX ADMIN — FENTANYL CITRATE 250 MCG: 50 INJECTION, SOLUTION INTRAMUSCULAR; INTRAVENOUS at 10:45

## 2020-09-22 RX ADMIN — FENTANYL CITRATE 25 MCG: 50 INJECTION, SOLUTION INTRAMUSCULAR; INTRAVENOUS at 19:54

## 2020-09-22 RX ADMIN — SODIUM CHLORIDE 1.14 UNITS/HR: 900 INJECTION, SOLUTION INTRAVENOUS at 14:30

## 2020-09-22 RX ADMIN — SODIUM CHLORIDE 2.49 UNITS/HR: 900 INJECTION, SOLUTION INTRAVENOUS at 15:30

## 2020-09-22 RX ADMIN — ROCURONIUM BROMIDE 50 MG: 10 INJECTION INTRAVENOUS at 08:32

## 2020-09-22 RX ADMIN — CEFAZOLIN 2 G: 10 INJECTION, POWDER, FOR SOLUTION INTRAVENOUS at 17:16

## 2020-09-22 RX ADMIN — POTASSIUM CHLORIDE 20 MEQ: 400 INJECTION, SOLUTION INTRAVENOUS at 14:10

## 2020-09-22 RX ADMIN — SODIUM CHLORIDE: 9 INJECTION, SOLUTION INTRAVENOUS at 12:42

## 2020-09-22 RX ADMIN — MUPIROCIN: 20 OINTMENT TOPICAL at 19:50

## 2020-09-22 RX ADMIN — MUPIROCIN: 20 OINTMENT TOPICAL at 05:59

## 2020-09-22 RX ADMIN — FENTANYL CITRATE 25 MCG: 50 INJECTION, SOLUTION INTRAMUSCULAR; INTRAVENOUS at 23:02

## 2020-09-22 RX ADMIN — SODIUM CHLORIDE 1000 ML: 9 INJECTION, SOLUTION INTRAVENOUS at 17:46

## 2020-09-22 RX ADMIN — SODIUM CHLORIDE 2 UNITS/HR: 900 INJECTION, SOLUTION INTRAVENOUS at 10:19

## 2020-09-22 RX ADMIN — AMINOCAPROIC ACID 5000 MG: 250 INJECTION, SOLUTION INTRAVENOUS at 09:00

## 2020-09-22 RX ADMIN — HEPARIN SODIUM 5000 UNITS: 10000 INJECTION, SOLUTION INTRAVENOUS; SUBCUTANEOUS at 10:21

## 2020-09-22 RX ADMIN — Medication 10 ML: at 16:45

## 2020-09-22 RX ADMIN — NITROGLYCERIN 25 MCG/MIN: 200 INJECTION, SOLUTION INTRAVENOUS at 12:42

## 2020-09-22 RX ADMIN — SODIUM CHLORIDE 4.16 UNITS/HR: 900 INJECTION, SOLUTION INTRAVENOUS at 16:35

## 2020-09-22 RX ADMIN — CEFAZOLIN 1 G: 10 INJECTION, POWDER, FOR SOLUTION INTRAVENOUS at 13:00

## 2020-09-22 RX ADMIN — POTASSIUM CHLORIDE 20 MEQ: 400 INJECTION, SOLUTION INTRAVENOUS at 18:36

## 2020-09-22 RX ADMIN — KETOROLAC TROMETHAMINE 30 MG: 30 INJECTION, SOLUTION INTRAMUSCULAR at 19:50

## 2020-09-22 RX ADMIN — MIDAZOLAM 2.5 MG: 1 INJECTION INTRAMUSCULAR; INTRAVENOUS at 11:50

## 2020-09-22 RX ADMIN — BUPIVACAINE HYDROCHLORIDE 20 ML: 5 INJECTION, SOLUTION EPIDURAL; INTRACAUDAL; PERINEURAL at 13:51

## 2020-09-22 RX ADMIN — ETOMIDATE 10 MG: 2 INJECTION, SOLUTION INTRAVENOUS at 08:28

## 2020-09-22 RX ADMIN — CHLORHEXIDINE GLUCONATE 15 ML: 1.2 RINSE ORAL at 19:50

## 2020-09-22 RX ADMIN — CHLORHEXIDINE GLUCONATE 15 ML: 1.2 RINSE ORAL at 05:59

## 2020-09-22 RX ADMIN — ROCURONIUM BROMIDE 50 MG: 10 INJECTION INTRAVENOUS at 09:30

## 2020-09-22 RX ADMIN — BUPIVACAINE 20 ML: 13.3 INJECTION, SUSPENSION, LIPOSOMAL INFILTRATION at 13:51

## 2020-09-22 RX ADMIN — SODIUM CHLORIDE: 9 INJECTION, SOLUTION INTRAVENOUS at 08:23

## 2020-09-22 RX ADMIN — PROPOFOL 150 MG: 10 INJECTION, EMULSION INTRAVENOUS at 08:32

## 2020-09-22 RX ADMIN — Medication 10 ML: at 21:00

## 2020-09-22 RX ADMIN — FENTANYL CITRATE 25 MCG: 50 INJECTION, SOLUTION INTRAMUSCULAR; INTRAVENOUS at 20:57

## 2020-09-22 RX ADMIN — SODIUM BICARBONATE 50 MEQ: 84 INJECTION, SOLUTION INTRAVENOUS at 17:34

## 2020-09-22 RX ADMIN — HEPARIN SODIUM 27000 UNITS: 10000 INJECTION, SOLUTION INTRAVENOUS; SUBCUTANEOUS at 10:11

## 2020-09-22 RX ADMIN — CEFAZOLIN 2 G: 10 INJECTION, POWDER, FOR SOLUTION INTRAVENOUS at 23:02

## 2020-09-22 RX ADMIN — SODIUM CHLORIDE 1 G: 900 INJECTION, SOLUTION INTRAVENOUS at 16:31

## 2020-09-22 RX ADMIN — ROCURONIUM BROMIDE 50 MG: 10 INJECTION INTRAVENOUS at 11:50

## 2020-09-22 RX ADMIN — FENTANYL CITRATE 250 MCG: 50 INJECTION, SOLUTION INTRAMUSCULAR; INTRAVENOUS at 09:10

## 2020-09-22 RX ADMIN — VANCOMYCIN HYDROCHLORIDE 1000 MG: 10 INJECTION, POWDER, LYOPHILIZED, FOR SOLUTION INTRAVENOUS at 08:55

## 2020-09-22 RX ADMIN — FENTANYL CITRATE 250 MCG: 50 INJECTION, SOLUTION INTRAMUSCULAR; INTRAVENOUS at 08:28

## 2020-09-22 RX ADMIN — SODIUM CHLORIDE, POTASSIUM CHLORIDE, SODIUM LACTATE AND CALCIUM CHLORIDE: 600; 310; 30; 20 INJECTION, SOLUTION INTRAVENOUS at 05:59

## 2020-09-22 RX ADMIN — FENTANYL CITRATE 25 MCG: 50 INJECTION, SOLUTION INTRAMUSCULAR; INTRAVENOUS at 22:08

## 2020-09-22 RX ADMIN — Medication 100 MG: at 08:28

## 2020-09-22 RX ADMIN — SODIUM CHLORIDE, POTASSIUM CHLORIDE, SODIUM LACTATE AND CALCIUM CHLORIDE: 600; 310; 30; 20 INJECTION, SOLUTION INTRAVENOUS at 15:31

## 2020-09-22 RX ADMIN — CHLORHEXIDINE GLUCONATE 15 ML: 1.2 RINSE ORAL at 16:39

## 2020-09-22 RX ADMIN — ALBUMIN (HUMAN) 25 G: 12.5 INJECTION, SOLUTION INTRAVENOUS at 14:30

## 2020-09-22 RX ADMIN — ALBUMIN (HUMAN) 250 ML: 12.5 SOLUTION INTRAVENOUS at 12:42

## 2020-09-22 RX ADMIN — SODIUM CHLORIDE 1000 ML: 9 INJECTION, SOLUTION INTRAVENOUS at 15:00

## 2020-09-22 RX ADMIN — ROCURONIUM BROMIDE 50 MG: 10 INJECTION INTRAVENOUS at 10:18

## 2020-09-22 RX ADMIN — MUPIROCIN: 20 OINTMENT TOPICAL at 15:29

## 2020-09-22 RX ADMIN — NITROGLYCERIN 100 MCG/MIN: 20 INJECTION INTRAVENOUS at 14:05

## 2020-09-22 RX ADMIN — MIDAZOLAM 2.5 MG: 1 INJECTION INTRAMUSCULAR; INTRAVENOUS at 10:45

## 2020-09-22 ASSESSMENT — PAIN DESCRIPTION - FREQUENCY
FREQUENCY: CONTINUOUS

## 2020-09-22 ASSESSMENT — PULMONARY FUNCTION TESTS
PIF_VALUE: 1
PIF_VALUE: 1
PIF_VALUE: 20
PIF_VALUE: 21
PIF_VALUE: 21
PIF_VALUE: 1
PIF_VALUE: 34
PIF_VALUE: 17
PIF_VALUE: 19
PIF_VALUE: 22
PIF_VALUE: 19
PIF_VALUE: 24
PIF_VALUE: 23
PIF_VALUE: 2
PIF_VALUE: 26
PIF_VALUE: 23
PIF_VALUE: 1
PIF_VALUE: 19
PIF_VALUE: 23
PIF_VALUE: 1
PIF_VALUE: 22
PIF_VALUE: 1
PIF_VALUE: 21
PIF_VALUE: 1
PIF_VALUE: 1
PIF_VALUE: 10
PIF_VALUE: 1
PIF_VALUE: 18
PIF_VALUE: 22
PIF_VALUE: 24
PIF_VALUE: 9
PIF_VALUE: 23
PIF_VALUE: 15
PIF_VALUE: 10
PIF_VALUE: 21
PIF_VALUE: 22
PIF_VALUE: 21
PIF_VALUE: 21
PIF_VALUE: 13
PIF_VALUE: 32
PIF_VALUE: 1
PIF_VALUE: 19
PIF_VALUE: 1
PIF_VALUE: 16
PIF_VALUE: 20
PIF_VALUE: 10
PIF_VALUE: 22
PIF_VALUE: 8
PIF_VALUE: 26
PIF_VALUE: 22
PIF_VALUE: 1
PIF_VALUE: 21
PIF_VALUE: 1
PIF_VALUE: 1
PIF_VALUE: 20
PIF_VALUE: 22
PIF_VALUE: 22
PIF_VALUE: 9
PIF_VALUE: 20
PIF_VALUE: 1
PIF_VALUE: 1
PIF_VALUE: 0
PIF_VALUE: 1
PIF_VALUE: 23
PIF_VALUE: 1
PIF_VALUE: 15
PIF_VALUE: 26
PIF_VALUE: 1
PIF_VALUE: 1
PIF_VALUE: 5
PIF_VALUE: 1
PIF_VALUE: 24
PIF_VALUE: 23
PIF_VALUE: 1
PIF_VALUE: 15
PIF_VALUE: 27
PIF_VALUE: 2
PIF_VALUE: 8
PIF_VALUE: 26
PIF_VALUE: 15
PIF_VALUE: 1
PIF_VALUE: 15
PIF_VALUE: 1
PIF_VALUE: 21
PIF_VALUE: 18
PIF_VALUE: 1
PIF_VALUE: 22
PIF_VALUE: 19
PIF_VALUE: 22
PIF_VALUE: 19
PIF_VALUE: 21
PIF_VALUE: 1
PIF_VALUE: 20
PIF_VALUE: 1
PIF_VALUE: 1
PIF_VALUE: 21
PIF_VALUE: 23
PIF_VALUE: 1
PIF_VALUE: 2
PIF_VALUE: 0
PIF_VALUE: 23
PIF_VALUE: 1
PIF_VALUE: 23
PIF_VALUE: 1
PIF_VALUE: 23
PIF_VALUE: 5
PIF_VALUE: 19
PIF_VALUE: 22
PIF_VALUE: 1
PIF_VALUE: 19
PIF_VALUE: 22
PIF_VALUE: 22
PIF_VALUE: 1
PIF_VALUE: 20
PIF_VALUE: 2
PIF_VALUE: 24
PIF_VALUE: 22
PIF_VALUE: 1
PIF_VALUE: 1
PIF_VALUE: 21
PIF_VALUE: 1
PIF_VALUE: 22
PIF_VALUE: 1
PIF_VALUE: 1
PIF_VALUE: 25
PIF_VALUE: 24
PIF_VALUE: 1
PIF_VALUE: 10
PIF_VALUE: 19
PIF_VALUE: 8
PIF_VALUE: 1
PIF_VALUE: 1
PIF_VALUE: 15
PIF_VALUE: 20
PIF_VALUE: 1
PIF_VALUE: 19
PIF_VALUE: 25
PIF_VALUE: 1
PIF_VALUE: 0
PIF_VALUE: 23
PIF_VALUE: 2
PIF_VALUE: 23
PIF_VALUE: 20
PIF_VALUE: 1
PIF_VALUE: 1
PIF_VALUE: 22
PIF_VALUE: 14
PIF_VALUE: 21
PIF_VALUE: 1
PIF_VALUE: 18
PIF_VALUE: 22
PIF_VALUE: 21
PIF_VALUE: 1
PIF_VALUE: 26
PIF_VALUE: 22
PIF_VALUE: 23
PIF_VALUE: 21
PIF_VALUE: 19
PIF_VALUE: 1
PIF_VALUE: 21
PIF_VALUE: 1
PIF_VALUE: 18
PIF_VALUE: 21
PIF_VALUE: 25
PIF_VALUE: 23
PIF_VALUE: 2
PIF_VALUE: 22
PIF_VALUE: 7
PIF_VALUE: 9
PIF_VALUE: 1
PIF_VALUE: 21
PIF_VALUE: 1
PIF_VALUE: 22
PIF_VALUE: 1
PIF_VALUE: 23
PIF_VALUE: 19
PIF_VALUE: 2
PIF_VALUE: 1
PIF_VALUE: 19
PIF_VALUE: 1
PIF_VALUE: 25
PIF_VALUE: 1
PIF_VALUE: 1
PIF_VALUE: 19
PIF_VALUE: 2
PIF_VALUE: 19
PIF_VALUE: 21
PIF_VALUE: 1
PIF_VALUE: 22
PIF_VALUE: 1
PIF_VALUE: 14
PIF_VALUE: 1
PIF_VALUE: 19
PIF_VALUE: 21
PIF_VALUE: 22
PIF_VALUE: 1
PIF_VALUE: 24
PIF_VALUE: 24
PIF_VALUE: 26
PIF_VALUE: 11
PIF_VALUE: 3
PIF_VALUE: 22
PIF_VALUE: 1
PIF_VALUE: 24
PIF_VALUE: 20
PIF_VALUE: 23
PIF_VALUE: 22
PIF_VALUE: 1
PIF_VALUE: 12
PIF_VALUE: 22
PIF_VALUE: 1
PIF_VALUE: 23
PIF_VALUE: 1
PIF_VALUE: 23
PIF_VALUE: 1
PIF_VALUE: 7
PIF_VALUE: 19
PIF_VALUE: 22
PIF_VALUE: 22
PIF_VALUE: 23
PIF_VALUE: 2
PIF_VALUE: 0
PIF_VALUE: 17
PIF_VALUE: 1
PIF_VALUE: 1
PIF_VALUE: 25
PIF_VALUE: 21
PIF_VALUE: 1
PIF_VALUE: 21
PIF_VALUE: 0
PIF_VALUE: 4
PIF_VALUE: 24
PIF_VALUE: 2
PIF_VALUE: 20
PIF_VALUE: 21
PIF_VALUE: 20
PIF_VALUE: 1
PIF_VALUE: 18
PIF_VALUE: 10
PIF_VALUE: 21
PIF_VALUE: 26
PIF_VALUE: 22
PIF_VALUE: 1
PIF_VALUE: 20
PIF_VALUE: 7
PIF_VALUE: 3
PIF_VALUE: 10
PIF_VALUE: 23
PIF_VALUE: 21
PIF_VALUE: 1
PIF_VALUE: 10
PIF_VALUE: 20
PIF_VALUE: 19
PIF_VALUE: 1
PIF_VALUE: 9
PIF_VALUE: 9
PIF_VALUE: 1
PIF_VALUE: 21
PIF_VALUE: 1
PIF_VALUE: 1
PIF_VALUE: 23
PIF_VALUE: 18
PIF_VALUE: 1
PIF_VALUE: 23
PIF_VALUE: 20
PIF_VALUE: 9
PIF_VALUE: 22
PIF_VALUE: 1
PIF_VALUE: 3
PIF_VALUE: 23
PIF_VALUE: 22
PIF_VALUE: 20
PIF_VALUE: 2
PIF_VALUE: 0
PIF_VALUE: 26
PIF_VALUE: 1
PIF_VALUE: 21
PIF_VALUE: 15
PIF_VALUE: 21
PIF_VALUE: 23
PIF_VALUE: 23
PIF_VALUE: 1
PIF_VALUE: 31
PIF_VALUE: 20
PIF_VALUE: 1
PIF_VALUE: 1
PIF_VALUE: 21
PIF_VALUE: 26
PIF_VALUE: 23
PIF_VALUE: 24
PIF_VALUE: 22
PIF_VALUE: 24
PIF_VALUE: 22
PIF_VALUE: 22
PIF_VALUE: 10
PIF_VALUE: 1
PIF_VALUE: 2
PIF_VALUE: 18
PIF_VALUE: 1
PIF_VALUE: 23
PIF_VALUE: 1
PIF_VALUE: 21
PIF_VALUE: 9
PIF_VALUE: 22
PIF_VALUE: 1
PIF_VALUE: 26
PIF_VALUE: 2
PIF_VALUE: 22
PIF_VALUE: 23
PIF_VALUE: 25
PIF_VALUE: 22
PIF_VALUE: 22
PIF_VALUE: 9
PIF_VALUE: 2
PIF_VALUE: 1
PIF_VALUE: 15
PIF_VALUE: 1
PIF_VALUE: 1
PIF_VALUE: 23
PIF_VALUE: 18
PIF_VALUE: 18
PIF_VALUE: 21
PIF_VALUE: 20
PIF_VALUE: 24
PIF_VALUE: 24
PIF_VALUE: 1
PIF_VALUE: 19
PIF_VALUE: 10
PIF_VALUE: 21
PIF_VALUE: 21
PIF_VALUE: 1
PIF_VALUE: 22
PIF_VALUE: 23
PIF_VALUE: 22
PIF_VALUE: 1
PIF_VALUE: 23
PIF_VALUE: 1
PIF_VALUE: 21
PIF_VALUE: 1

## 2020-09-22 ASSESSMENT — PAIN DESCRIPTION - PAIN TYPE
TYPE: ACUTE PAIN;SURGICAL PAIN

## 2020-09-22 ASSESSMENT — PAIN DESCRIPTION - LOCATION
LOCATION: CHEST;STERNUM
LOCATION: CHEST
LOCATION: CHEST;STERNUM

## 2020-09-22 ASSESSMENT — PAIN DESCRIPTION - DESCRIPTORS
DESCRIPTORS: ACHING;DISCOMFORT
DESCRIPTORS: ACHING
DESCRIPTORS: ACHING;DISCOMFORT
DESCRIPTORS: ACHING;DISCOMFORT
DESCRIPTORS: ACHING
DESCRIPTORS: ACHING
DESCRIPTORS: ACHING;DISCOMFORT

## 2020-09-22 ASSESSMENT — PAIN DESCRIPTION - ORIENTATION
ORIENTATION: MID

## 2020-09-22 ASSESSMENT — PAIN DESCRIPTION - PROGRESSION
CLINICAL_PROGRESSION: GRADUALLY WORSENING

## 2020-09-22 ASSESSMENT — PAIN DESCRIPTION - ONSET
ONSET: ON-GOING

## 2020-09-22 ASSESSMENT — PAIN SCALES - GENERAL
PAINLEVEL_OUTOF10: 8
PAINLEVEL_OUTOF10: 10
PAINLEVEL_OUTOF10: 10
PAINLEVEL_OUTOF10: 6
PAINLEVEL_OUTOF10: 6
PAINLEVEL_OUTOF10: 8
PAINLEVEL_OUTOF10: 2
PAINLEVEL_OUTOF10: 7
PAINLEVEL_OUTOF10: 7

## 2020-09-22 ASSESSMENT — PAIN - FUNCTIONAL ASSESSMENT
PAIN_FUNCTIONAL_ASSESSMENT: ACTIVITIES ARE NOT PREVENTED
PAIN_FUNCTIONAL_ASSESSMENT: PREVENTS OR INTERFERES SOME ACTIVE ACTIVITIES AND ADLS
PAIN_FUNCTIONAL_ASSESSMENT: PREVENTS OR INTERFERES SOME ACTIVE ACTIVITIES AND ADLS
PAIN_FUNCTIONAL_ASSESSMENT: ACTIVITIES ARE NOT PREVENTED
PAIN_FUNCTIONAL_ASSESSMENT: PREVENTS OR INTERFERES SOME ACTIVE ACTIVITIES AND ADLS
PAIN_FUNCTIONAL_ASSESSMENT: PREVENTS OR INTERFERES SOME ACTIVE ACTIVITIES AND ADLS
PAIN_FUNCTIONAL_ASSESSMENT: ACTIVITIES ARE NOT PREVENTED

## 2020-09-22 NOTE — PROGRESS NOTES
1530- increased heart rate       Heart rate in the 110's Magalie CABRERA Obial notified and no additional orders given

## 2020-09-22 NOTE — PROGRESS NOTES
MECHANICAL VENTILATION WEANING PROTOCOL    PRE-TRIAL PATIENT ASSESSMENT - COMPLETED AT 1707    Ventilatory Assessment:    PARAMETER CRITERIA FOR WEANING   Spontaneous Cough:  Yes    Sputum Characteristics:  · Sputum Amount: Small  · Tenacity: Thick  · Sputum Color: White SPONTANEOUS COUGH With small to moderate  Amount of secretions   FiO2 : 40 % FIO2 less than or equal to 50%     PEEP less than or equal to 8   Progressive Mobility Protocol  Yes     ABG:  Lab Results   Component Value Date    PHART 7.270 09/22/2020    AOS8QSQ 50.0 09/22/2020    PO2ART 106.5 09/22/2020    K4SMSQZK 97 09/22/2020    FGF3TCM 23.0 09/22/2020    BEART -4 09/22/2020     HGB/WBC:  Lab Results   Component Value Date    HGB 10.5 09/22/2020    WBC 12.9 09/22/2020        Vital Signs:    PARAMETER CRITERIA FOR WEANING Meets Criteria   Pulse: 120 Within patient's normal limits / stable Yes   Resp: (!) 119 Less than or equal to 30 Yes   BP: 107/63 Within patient's normal limits / minimal pressors (Hemodynamically Stable) Yes   SpO2: 100 % Greater than or equal to 90% Yes     Within patient's normal limits Yes   Temp: 99.8 °F (37.7 °C) Less than 38. 5oC / 101. 3oF Yes     [x]    Based on this assessment and the [facility] Ventilator Weaning Protocol, this patient  IS being placed on a Spontaneous Breathing Trial (SBT) at this time.  []    Based on this assessment and the [facility] Ventilator Weaning Protocol, this patient  IS NOT being placed on a Spontaneous Breathing Trial (SBT) at this time. []    Patient  IS NOT being placed on a Spontaneous Breathing Trial (SBT) at this time because of factors not previously addressed.   Those factors include   Vital Capacity (VC) = N/A    Maximum Inspiratory Force (MIF) = -23    SBT - Initiated at  1708    Ventilator Settings:  CPAP - 5 cmH2O, PS - 10 cmH2O(if using settings other than CPAP 5/PS 8, please explain reasons for settings here):  CABG pt.     1 Minute SBT Respiratory Parameters:   VE: 11.5 L   RR: 25 b/m   VT: 460 mL (average VT = VE/RR)   RSBI: 54 (RR/VT in liters)   ETCO2: 36 cmH2O   SPO2: 100 %   If on sedation, amount and type None    [x]   RR is less than 35, RSBI is less than 100, patient's vitals signs are stable, and patient is in no apparent distress; therefore, patient is being left on SBT for up to 1 hour. []   RR is greater than 35, RSBI is greater than 100, VS's are unstable, or patient is in distress; therefore, patient is being placed back on previous settings. SBT - Concluded at  [employer]. Weaning Parameters/VS's at conclusion of SBT:   VE: 12.1 L   RR: 25 b/m   VT: 484 mL (average VT = VE/RR)   RSBI: 50 (RR/VT in liters)   ETCO2: 33 cmH2O   SPO2: 99 %    If on sedation, amount and type None    [x]   Patient tolerated SBT for full 60 minutes with acceptable weaning parameters and vital signs and showed no signs or distress. []   Patient tolerated SBT for full 60 minutes, but had unacceptable weaning parameters or vital signs, and/or signs of distress. []   Patient was unable to tolerate SBT for 60 minutes and was placed back on previous settings.             COMMENTS:

## 2020-09-22 NOTE — ANESTHESIA POSTPROCEDURE EVALUATION
Department of Anesthesiology  Postprocedure Note    Patient: Mar Hess  MRN: [id number]  YOB: 1960  Date of evaluation: 9/22/2020  Time:  2:23 PM     Procedure Summary     Date:  09/22/20 Room / Location:  [address] [place] [address] / [facility]    Anesthesia Start:  5308 Anesthesia Stop:  0352    Procedure:  CABG x 3, LIMA to LAD w/ 5.2 cm endarterectomy, SVG to OM2, SVG to distal RCA; 40 mm Atriclip 2 to LA appendage; pectoral block per anesthesia; endoscopic SV harvest L thigh (Left Chest) Diagnosis:       Coronary artery disease with angina pectoris, unspecified vessel or lesion type, unspecified whether native or transplanted heart ([facility])      (Coronary artery disease with angina pectoris, unspecified vessel or lesion type, unspecified whether native or transplanted heart ([facility] [I25.119])    Surgeon:  Gregorio Ayala MD Responsible Provider:  Miguel Nguyen MD    Anesthesia Type:  general ASA Status:  4          Anesthesia Type: general    Jessy Phase I:      Jessy Phase II:      Last vitals: Reviewed and per EMR flowsheets.        Anesthesia Post Evaluation    Patient location during evaluation: ICU  Level of consciousness: sedated and ventilated  Airway patency: patent  Nausea & Vomiting: no vomiting  Complications: no  Cardiovascular status: hemodynamically stable (on nitroglycerin)  Respiratory status: acceptable  Hydration status: euvolemic

## 2020-09-22 NOTE — PROGRESS NOTES
1840- extubation     Patient extubated per Dr Fidencio Ruiz order, and placed on 4liters nasal canula oxygen with % sat 96- 98 % and resp rate 14

## 2020-09-22 NOTE — PROGRESS NOTES
1800-skin     Patient turned and repositioned skin inspected and sacral heart in place, heels elevated on pillows

## 2020-09-22 NOTE — PLAN OF CARE
Problem: Pain:  Goal: Pain level will decrease  Outcome: Ongoing   Patient denies any pain at this time. Will continue to monitor. Problem: HEMODYNAMIC STATUS  Goal: Patient has stable vital signs and fluid balance  Outcome: Ongoing   Patient was admitted with NSTEMI. Patient is to have CABG with Dr. Marilee Hager in AM. Patient remains therapeutic on Heparin drip. Patient has been hemodynamically stable with stable vital signs and labs. Patients heart rate is; 60-80's; NSR, on continuous telemetry monitoring. Patient denies any chest pain or SOB at this time. Will continue to monitor. Problem: Bleeding:  Goal: Will show no signs and symptoms of excessive bleeding  9/21/2020 0312 by Janelle Urbano RN  Outcome: Ongoing  Patient is on heparin drip. Patient is therapeutic. Patient has not shown any signs or symptoms of bleeding. Will continue to monitor.

## 2020-09-22 NOTE — BRIEF OP NOTE
Pre-op Dx: NSTEMI; unstable angina; smoking; Type 2 DM; HLD; HTN      Post-op Dx:same; acute blood loss anemia      Procedure:CABG x 3, LIMA to LAD w/ 5.2 cm endarterectomy, SVG to OM2, SVG to distal RCA; 40 mm Atriclip 2 to LA appendage; pectoral block per anesthesia; endoscopic SV harvest L thigh       Anesthesia:General endotracheal anesthesia      Surgeon/Assistants:Itz/Pino/Eddie/Gerald      Specimens: LAD plaque      EBL: autotransfusion      Complications: none      Findings: sclerotic aortic valve on echo but no gradient; normal EF pre and post op; trivial central MR w/ small prolapsing segment of P2; LAD densely plaqued distally

## 2020-09-22 NOTE — PROGRESS NOTES
HS Hibiclens shower completed, new gown applied and bed changed.      Electronically signed by Hailey Sharma RN on 9/21/2020 at 9:07 PM Patient is a pleasant 69-year-old woman with a history of colon polyps here for follow-up colonoscopy. Currently having no symptoms related to her lower GI tract. Past history significant for coronary artery disease and hypertension. No other significant prior problems. Family history was noncontributory. Social history noncontributory. Review of systems nothing related to her current colonoscopy. On physical exam her vital signs are stable and she is afebrile. HEENT unremarkable. Neck is supple. Lungs are clear. Heart exam is unremarkable. Abdomen is soft and nontender. Rectal was deferred. Impression history of polyps:    Plan follow-up colonoscopy. Tita Pérez MD FACS.

## 2020-09-22 NOTE — PROGRESS NOTES
[address] to ICU      Patient admitted to ICU following cabg x4 with atrial clip, report received from anesthesia , patient admitted on nitro and insulin infusions , assessment as charted , family back to see patient all questions answered , fall precautions in place

## 2020-09-22 NOTE — ANESTHESIA PRE PROCEDURE
Department of Anesthesiology  Preprocedure Note       Name:  Gurwinder Maharaj   Age:  63 y.o.  :  1960                                          MRN:  [id number]         Date:  2020      Surgeon: Noemí Degree):  Michelle Ramirez MD    Procedure: Procedure(s):  CORONARY ARTERY BYPASS GRAFT X 4, POSSIBLE LEFT ATRIAL APPENDAGE CLIP    Medications prior to admission:   Prior to Admission medications    Medication Sig Start Date End Date Taking? Authorizing Provider   amLODIPine (NORVASC) 5 MG tablet Take 5 mg by mouth daily  20  Yes Historical Provider, MD   Multiple Vitamins-Minerals (THERAPEUTIC MULTIVITAMIN-MINERALS) tablet Take 1 tablet by mouth daily   Yes Historical Provider, MD   traMADol (ULTRAM) 50 MG tablet Take 50 mg by mouth 2 times daily as needed (arthritis pain).    Yes Historical Provider, MD   oxymetazoline (AFRIN) 0.05 % nasal spray 2 sprays by Nasal route 2 times daily as needed for Congestion   Yes Historical Provider, MD       Current medications:    Current Facility-Administered Medications   Medication Dose Route Frequency Provider Last Rate Last Dose    lactated ringers infusion   Intravenous Continuous Cyndie Ahr, APRN - CNP 50 mL/hr at 20 0559      ceFAZolin (ANCEF) 2 g in dextrose 5 % 50 mL IVPB  2 g Intravenous On Call to 4141 Regi Chin APRN - CNP        vancomycin (VANCOCIN) 1,000 mg in dextrose 5 % 250 mL IVPB  1,000 mg Intravenous On Call to 4141 Regi Chin APRN - CNP        chlorhexidine (HIBICLENS) 4 % liquid   Topical See Admin Instructions Cyndie Ahr, APRN - CNP        traMADol (ULTRAM) tablet 50 mg  50 mg Oral BID PRN Bella Padilla DO   50 mg at 20 1359    nitroGLYCERIN (NITROSTAT) SL tablet 0.4 mg  0.4 mg Sublingual Q5 Min PRN Emiliano Montanez MD   0.4 mg at 20 0316    amLODIPine (NORVASC) tablet 5 mg  5 mg Oral Daily Emiliano Montanez MD   5 mg at 20 0908    sodium chloride flush 0.9 % injection 10 mL  10 mL Intravenous 2 times per [Nitrofurantoin Macrocrystal] Other (See Comments)     Flu like s/s       Problem List:    Patient Active Problem List   Diagnosis Code    Uncontrolled hypertension I10    Chronic eczematous otitis externa of both ears H60.8X3    Cardiac murmur R01.1    Chronic pain syndrome G89.4    Chronic neck pain M54.2, G89.29    Chest pain R07.9    NSTEMI (non-ST elevated myocardial infarction) ([facility] I21.4    Coronary artery disease due to lipid rich plaque I25.10, I25.83    Type 2 diabetes mellitus without complication (HCC) O26.2    Hyperlipidemia E78.5       Past Medical History:        Diagnosis Date    Arthritis     Hypertension        Past Surgical History:        Procedure Laterality Date    CARPAL TUNNEL RELEASE  2011    right  hand    KNEE SURGERY  2009    right knee  replacement       Social History:    Social History     Tobacco Use    Smoking status: Current Every Day Smoker     Packs/day: 0.50     Years: 20.00     Pack years: 10.00    Smokeless tobacco: Never Used   Substance Use Topics    Alcohol use: No                                Ready to quit: Not Answered  Counseling given: Not Answered      Vital Signs (Current):   Vitals:    09/21/20 2337 09/22/20 0111 09/22/20 0305 09/22/20 0526   BP: 114/67   125/61   Pulse: 70 63 68 66   Resp: 16   18   Temp: 97.3 °F (36.3 °C)   98.1 °F (36.7 °C)   TempSrc: Oral   Oral   SpO2: 99%   99%   Weight:    159 lb 9.8 oz (72.4 kg)   Height:                                                  BP Readings from Last 3 Encounters:   09/22/20 125/61   02/28/20 (!) 179/91   08/29/18 (!) 142/100       NPO Status:                                                                                 BMI:   Wt Readings from Last 3 Encounters:   09/22/20 159 lb 9.8 oz (72.4 kg)   02/28/20 159 lb 13.3 oz (72.5 kg)   08/29/18 164 lb 3.2 oz (74.5 kg)     Body mass index is 25 kg/m².     CBC:   Lab Results   Component Value Date    WBC 5.9 09/22/2020    RBC 4.59 09/22/2020    HGB 12.9 09/22/2020    HCT 38.7 09/22/2020    MCV 84.3 09/22/2020    RDW 13.8 09/22/2020     09/22/2020       CMP:   Lab Results   Component Value Date     09/22/2020    K 4.9 09/22/2020    K 3.8 09/16/2020     09/22/2020    CO2 28 09/22/2020    BUN 20 09/22/2020    CREATININE 0.6 09/22/2020    GFRAA >60 09/22/2020    LABGLOM >60 09/22/2020    GLUCOSE 119 09/22/2020    PROT 5.9 09/18/2020    CALCIUM 9.5 09/22/2020    BILITOT 0.3 09/18/2020    ALKPHOS 82 09/18/2020    AST 39 09/18/2020    ALT 28 09/18/2020       POC Tests:   Recent Labs     09/22/20  0733   POCGLU 103*       Coags:   Lab Results   Component Value Date    PROTIME 12.0 09/16/2020    INR 1.03 09/16/2020    APTT 28.1 09/16/2020       HCG (If Applicable): No results found for: PREGTESTUR, PREGSERUM, HCG, HCGQUANT     ABGs: No results found for: PHART, PO2ART, AUF5WWW, CEU2KSO, BEART, R5ANACKN     Type & Screen (If Applicable):  No results found for: LABABO, LABRH    Drug/Infectious Status (If Applicable):  No results found for: HIV, HEPCAB    COVID-19 Screening (If Applicable):   Lab Results   Component Value Date    COVID19 Not Detected 09/16/2020    COVID19 Not Detected 09/16/2020         Anesthesia Evaluation    Airway: Mallampati: II  TM distance: >3 FB   Neck ROM: full  Mouth opening: > = 3 FB Dental: normal exam         Pulmonary: breath sounds clear to auscultation                             Cardiovascular:    (+) hypertension:, past MI:, CAD:, hyperlipidemia        Rhythm: regular  Rate: normal                 ROS comment: Mild concentric left ventricular hypertrophy. Normal left ventricular systolic function with an estimated ejection   fraction of 60%-65%. Normal diastolic function. Trivial mitral regurgitation. Aortic sclerosis - mild aortic stenosis with a peak velocity of 2.39m/s and   a mean pressure gradient of 13mmHg. Trivial tricuspid regurgitation. The pulmonic valve is not well visualized.    Estimated pulmonary artery systolic pressure is at 26 mmHg assuming a right   atrial pressure of 3 mmHg. Neuro/Psych:                ROS comment: Chronic pain GI/Hepatic/Renal:             Endo/Other:    (+) DiabetesType II DM, , .                 Abdominal:           Vascular:                                        Anesthesia Plan      general     ASA 4       Induction: intravenous. arterial line, central line, CVP and HADLEY  MIPS: Postoperative opioids intended and Prophylactic antiemetics administered. Anesthetic plan and risks discussed with patient. Use of blood products discussed with patient whom consented to blood products.                    Heidy Pelayo MD   9/22/2020

## 2020-09-22 NOTE — PROGRESS NOTES
6259- airway      Patient moving all 4 ext to command strength equal vijay , switched to spontaneous mode on vent with pressure support 10/5

## 2020-09-22 NOTE — ANESTHESIA PROCEDURE NOTES
Central Venous Line:    A central venous line was placed using ultrasound guidance, in the OR for the following indication(s): central venous access and CVP monitoring. Sterility preparation included the following: hand hygiene performed prior to procedure, maximum sterile barriers used and sterile technique used to drape from head to toe. The patient was placed in Trendelenburg position. The right internal jugular vein was prepped. The site was prepped with Chloraprep. A 9 Fr (size), 10 (length), introducer double lumen was placed. During the procedure, the following specific steps were taken: target vein identified, needle advanced into vein and blood aspirated and guidewire advanced into vein. Intravenous verification was obtained by ultrasound. Post insertion care included: all ports aspirated, all ports flushed easily, guidewire removed intact, Biopatch applied, line sutured in place and dressing applied. During the procedure the patient experienced: patient tolerated procedure well with no complications and EBL 0mL. Anesthesia type: generalA(n) oximetric, 8 (size) Pulmonary Artery Catheter (PAC) was placed through the Introducer CVL in the right internal jugular vein. The PAC placement was confirmed by pressure tracing changes and secured at 45 cm (depth). The patient experienced the following events during the procedure: patient tolerated procedure well with no complications. PA Cath placed?: Yes  Staffing  Anesthesiologist: Consuelo Turner MD  Performed: Anesthesiologist   Preanesthetic Checklist  Completed: patient identified, site marked, surgical consent, pre-op evaluation, timeout performed, IV checked, risks and benefits discussed, monitors and equipment checked, anesthesia consent given, oxygen available and patient being monitored
Procedure Performed: HADLEY     Start Time:        End Time:      Preanesthesia Checklist:  Patient identified, IV assessed, risks and benefits discussed, monitors and equipment assessed, procedure being performed at surgeon's request and anesthesia consent obtained. General Procedure Information  Diagnostic Indications for Echo:  assessment of ascending aorta  Physician Requesting Echo: Saumya Blake MD  Location performed:  OR  Intubated  Bite block not placed  Probe Insertion:  Easy  Probe Type:  3D  Modalities:  2D only, color flow mapping, continuous wave Doppler, contrast, pulse wave Doppler and M-mode        Anesthesia Information  Performed Personally  Anesthesiologist:  Jurgen Kwon MD          Transesophageal Echocardiogram (HADLEY) ProbePlacement Procedure Note    NAME: Fifi Gloria     MR#: [id number]    Surgical Procedure: CABG       Surgeon: Rosario Galo             Date: 22 Sep 2020    Indication: ana maria-op monitoring and access    Procedure: After induction of General Endotracheal Anesthesia, a standard HADLEY probe was placed through the mouth and advanced into the esophagus and stomach. Multiple planes at multiple depths were viewed and the findings related to the surgeon. Ultrasound Findings:  Prepump: EF preserved 50% the RV is normal size and dysfunction. Normal LV diastology. Trace TR, trace to mild MR with what appears to be billowing of the P2 segment. THe AV is thickened and mildly sclerotic in all 3 leaflets. Greatest noted in [place], though opens and closes well. Grade 1 athero disease in desc ao. Post: EF >50% with empty hyperdyanamic LV.  RV function and size remain normal. Eccentric mild MR post    Complications: none    Matheus Cruz MD  9:22 AM
request

## 2020-09-22 NOTE — DISCHARGE INSTR - COC
Continuity of Care Form    Patient Name: Kuldip Edwards   :  1960  MRN:  [id number]    Admit date:  2020  Discharge date:  2020    Code Status Order: Full Code   Advance Directives:   [facility] Documentation       Date/Time Healthcare Directive Type of Healthcare Directive Copy in [facility] [address] Agent's Name Healthcare Agent's Phone Number    [phone]  No, patient does not have an advance directive for healthcare treatment -- -- -- -- --            Admitting Physician:  Brandy Brunner, MD  PCP: No primary care provider on file.     Discharging Nurse: PANCHITO [facility] Unit/Room#: OR/NONE  Discharging Unit Phone Number: [phone]    Emergency Contact:   Extended Emergency Contact Information  Primary Emergency Contact: Ariel Mojica   [address] Phone: [phone]  Relation: Other    Past Surgical History:  Past Surgical History:   Procedure Laterality Date    CARPAL TUNNEL RELEASE      right  hand    KNEE SURGERY      right knee  replacement       Immunization History:   Immunization History   Administered Date(s) Administered    Tdap (Boostrix, Adacel) 2018       Active Problems:  Patient Active Problem List   Diagnosis Code    Uncontrolled hypertension I10    Chronic eczematous otitis externa of both ears H60.8X3    Cardiac murmur R01.1    Chronic pain syndrome G89.4    Chronic neck pain M54.2, G89.29    Chest pain R07.9    NSTEMI (non-ST elevated myocardial infarction) ([facility] [address] I21.4    Coronary artery disease due to lipid rich plaque I25.10, I25.83    Type 2 diabetes mellitus without complication ([facility] [address] I03.9    Hyperlipidemia E78.5       Isolation/Infection:   Isolation            No Isolation          Patient Infection Status       Infection Onset Added Last Indicated Last Indicated By Review Planned Expiration Resolved Resolved By    None active    Resolved    COVID-19 Rule Out 09/16/20 09/16/20 09/16/20 COVID-19 (Ordered)   09/16/20 Rule-Out Test Resulted            Nurse Assessment:  Last Vital Signs: /61   Pulse 66   Temp 98.1 °F (36.7 °C) (Oral)   Resp 18   Ht 5' 7\" (1.702 m)   Wt 159 lb 9.8 oz (72.4 kg)   SpO2 99%   BMI 25.00 kg/m²     Last documented pain score (0-10 scale): Pain Level: 0  Last Weight:   Wt Readings from Last 1 Encounters:   09/22/20 159 lb 9.8 oz (72.4 kg)     Mental Status:  oriented, alert, coherent, logical, thought processes intact and able to concentrate and follow conversation    IV Access:  - None    Nursing Mobility/ADLs:  Walking   Independent  Transfer  Independent  Bathing  Independent  Dressing  Independent  [address]  Med Delivery   whole    Wound Care Documentation and Therapy:        Elimination:  Continence:   · Bowel: Yes  · Bladder: Yes  Urinary Catheter: None   Colostomy/Ileostomy/Ileal Conduit: No       Date of Last BM: 09/22/2020    Intake/Output Summary (Last 24 hours) at 9/22/2020 0908  Last data filed at 9/22/2020 0526  Gross per 24 hour   Intake 600 ml   Output --   Net 600 ml     I/O last 3 completed shifts: In: 600 [P.O.:600]  Out: -     Safety Concerns: At Risk for Falls    Impairments/Disabilities:      None    Nutrition Therapy:  Current Nutrition Therapy:   - Oral Diet:  General    Routes of Feeding: Oral  Liquids: Thin Liquids  Daily Fluid Restriction: no  Last Modified Barium Swallow with Video (Video Swallowing Test): not done    Treatments at the Time of Hospital Discharge:   Respiratory Treatments: na  Oxygen Therapy:  is not on home oxygen therapy.   Ventilator:    - No ventilator support    Rehab Therapies: Physical Therapy and Occupational Therapy  Weight Bearing Status/Restrictions: No weight bearing restirctions  Other Medical Equipment (for information only, NOT a DME order):  walker  Other Treatments: na    Patient's personal belongings (please select all that are sent with patient):  None    RN SIGNATURE:  Electronically signed by Christine Rodriguez RN on 9/26/20 at 11:42 AM EDT    CASE MANAGEMENT/SOCIAL WORK SECTION    Inpatient Status Date: 09/16/2020    Readmission Risk Assessment Score:  Readmission Risk              Risk of Unplanned Readmission:        11           Discharging to Facility/ Agency   · Name: [facility]  · Address:  · Phone:[phone]  · Fax:    Dialysis Facility (if applicable)   · Name:  · Address:  · Dialysis Schedule:  · Phone:  · Fax:    / signature: Electronically signed by Rebeca Hale RN on 9/25/20 at 12:02 PM EDT    PHYSICIAN SECTION    Prognosis: Good    Condition at Discharge: Stable    Rehab Potential (if transferring to Rehab): Good    Recommended Labs or Other Treatments After Discharge:   MUST  [address] [facility]    Pre-op weight:   159 lbs    Incision/Wound Care:    Midsternal      Chest Tube Site     Legs         Clean with antibacterial soap and water daily. Monitor for signs of infection    Upper Extremity Restrictions (sternal precautions):  No lifting, pushing, pulling over 5 pounds for 8 weeks. Remove chest tubes sutures 10 days post-op, after checking with surgeon's office. Please remove IJ suture if it is still in.   1.  Wash EACH incision daily with separate wash cloth with antibacterial soap and water; pat dry. Do NOT apply anything to incisions - NO LOTIONS, HYDROGEN PEROXIDE, POWDER. 2.  If discharged with dressing on wound, remove dressing and wash daily with antibacterial soap and water. Leave open to air unless draining. 3. FOZIA hose on during the day and off at night. Keep legs elevated while sitting, especially if edematous. 4.  Incentive spiropmeter X 10, cough and deep breath every hour while awake. 5.  Pulse Ox checks with each visit for home care and with vital signs for ECF.   CALL if pulse ox less than 90%. 6.  Keep activity log (ambulate as directed and bring log to follow up appointment.)  7. Daily weight and record  8. No tub bath. May shower  9. Up in chair for all meals  10. PT/OT - evaluate and treat  11. Follow up to see Dr Jayson aPtel on Wednesday, October 7, 2020 at 10:45 AM  12. Wheeled walker    Call the surgeon at [phone] for any for any of the following reasons:  · Changes in incision (increased redness, tenderness, warmth or drainage)  · Call for pain not relieved with pain medication  · Call for temp >101, HR <50 or >110 beats/min, SBP < 90 or >160. · No bowel movement for 3 days  · Daily weights: call if 2 pound weight gain in 24 hours or 5 pound weight gain in 1 week. · Call for persistent diarrhea, nausea, or vomiting. · Pain, tenderness, warmth, or redness in calf  · Call for symptomatic fluttering in chest, irregular pulse, dyspnea  · DEPRESSION: Call Primary Care Physician if feelings of depression and depression persists  · DIABETICS: Call Primary Care Physician for blood sugars >130 consistently       Physician Certification: I certify the above information and transfer of Yumiko Glasgow  is necessary for the continuing treatment of the diagnosis listed and that she requires Home Care for less 30 days. Update Admission H&P: Changes in H&P as follows - sp CABG  x 3 + placement of atriclip on 9/22/2020. Stable postop course    PHYSICIAN SIGNATURE:  Electronically signed by ROYER Hansen/S.  Janay Najera MD on 9/25/20 at 9:32 AM EDT

## 2020-09-23 LAB
ANION GAP SERPL CALCULATED.3IONS-SCNC: 8 MMOL/L (ref 3–16)
BUN BLDV-MCNC: 16 MG/DL (ref 7–20)
CALCIUM SERPL-MCNC: 8.4 MG/DL (ref 8.3–10.6)
CHLORIDE BLD-SCNC: 108 MMOL/L (ref 99–110)
CO2: 22 MMOL/L (ref 21–32)
CREAT SERPL-MCNC: 0.5 MG/DL (ref 0.6–1.2)
GFR AFRICAN AMERICAN: >60
GFR NON-AFRICAN AMERICAN: >60
GLUCOSE BLD-MCNC: 100 MG/DL (ref 70–99)
GLUCOSE BLD-MCNC: 105 MG/DL (ref 70–99)
GLUCOSE BLD-MCNC: 109 MG/DL (ref 70–99)
GLUCOSE BLD-MCNC: 114 MG/DL (ref 70–99)
GLUCOSE BLD-MCNC: 116 MG/DL (ref 70–99)
GLUCOSE BLD-MCNC: 119 MG/DL (ref 70–99)
GLUCOSE BLD-MCNC: 125 MG/DL (ref 70–99)
GLUCOSE BLD-MCNC: 132 MG/DL (ref 70–99)
GLUCOSE BLD-MCNC: 135 MG/DL (ref 70–99)
GLUCOSE BLD-MCNC: 137 MG/DL (ref 70–99)
GLUCOSE BLD-MCNC: 140 MG/DL (ref 70–99)
GLUCOSE BLD-MCNC: 140 MG/DL (ref 70–99)
GLUCOSE BLD-MCNC: 144 MG/DL (ref 70–99)
GLUCOSE BLD-MCNC: 150 MG/DL (ref 70–99)
GLUCOSE BLD-MCNC: 159 MG/DL (ref 70–99)
GLUCOSE BLD-MCNC: 160 MG/DL (ref 70–99)
GLUCOSE BLD-MCNC: 179 MG/DL (ref 70–99)
GLUCOSE BLD-MCNC: 193 MG/DL (ref 70–99)
GLUCOSE BLD-MCNC: 98 MG/DL (ref 70–99)
HCT VFR BLD CALC: 29.8 % (ref 36–48)
HEMOGLOBIN: 10.8 G/DL (ref 12–16)
HEMOGLOBIN: 12.7 G/DL (ref 12–16)
HEMOGLOBIN: 7.4 G/DL (ref 12–16)
HEMOGLOBIN: 7.5 G/DL (ref 12–16)
HEMOGLOBIN: 8.1 G/DL (ref 12–16)
HEMOGLOBIN: 8.2 G/DL (ref 12–16)
HEMOGLOBIN: 8.3 G/DL (ref 12–16)
HEMOGLOBIN: 9.9 G/DL (ref 12–16)
INR BLD: 1.18 (ref 0.86–1.14)
MAGNESIUM: 2.3 MG/DL (ref 1.8–2.4)
MCH RBC QN AUTO: 28.4 PG (ref 26–34)
MCHC RBC AUTO-ENTMCNC: 33.1 G/DL (ref 31–36)
MCV RBC AUTO: 85.9 FL (ref 80–100)
PDW BLD-RTO: 13.7 % (ref 12.4–15.4)
PERFORMED ON: ABNORMAL
PERFORMED ON: NORMAL
PLATELET # BLD: 170 K/UL (ref 135–450)
PMV BLD AUTO: 8.8 FL (ref 5–10.5)
POC POTASSIUM: 5.1 MMOL/L (ref 3.5–5.1)
POC SAMPLE TYPE: ABNORMAL
POTASSIUM SERPL-SCNC: 4.8 MMOL/L (ref 3.5–5.1)
PROTHROMBIN TIME: 13.7 SEC (ref 10–13.2)
RBC # BLD: 3.47 M/UL (ref 4–5.2)
SODIUM BLD-SCNC: 138 MMOL/L (ref 136–145)
WBC # BLD: 9.8 K/UL (ref 4–11)

## 2020-09-23 PROCEDURE — 94150 VITAL CAPACITY TEST: CPT

## 2020-09-23 PROCEDURE — 94664 DEMO&/EVAL PT USE INHALER: CPT

## 2020-09-23 PROCEDURE — 82947 ASSAY GLUCOSE BLOOD QUANT: CPT

## 2020-09-23 PROCEDURE — 94761 N-INVAS EAR/PLS OXIMETRY MLT: CPT

## 2020-09-23 PROCEDURE — 6360000002 HC RX W HCPCS: Performed by: THORACIC SURGERY (CARDIOTHORACIC VASCULAR SURGERY)

## 2020-09-23 PROCEDURE — 80048 BASIC METABOLIC PNL TOTAL CA: CPT

## 2020-09-23 PROCEDURE — 83735 ASSAY OF MAGNESIUM: CPT

## 2020-09-23 PROCEDURE — 37799 UNLISTED PX VASCULAR SURGERY: CPT

## 2020-09-23 PROCEDURE — 6360000002 HC RX W HCPCS: Performed by: STUDENT IN AN ORGANIZED HEALTH CARE EDUCATION/TRAINING PROGRAM

## 2020-09-23 PROCEDURE — 2100000000 HC CCU R&B

## 2020-09-23 PROCEDURE — C9113 INJ PANTOPRAZOLE SODIUM, VIA: HCPCS | Performed by: THORACIC SURGERY (CARDIOTHORACIC VASCULAR SURGERY)

## 2020-09-23 PROCEDURE — 84132 ASSAY OF SERUM POTASSIUM: CPT

## 2020-09-23 PROCEDURE — 2580000003 HC RX 258: Performed by: THORACIC SURGERY (CARDIOTHORACIC VASCULAR SURGERY)

## 2020-09-23 PROCEDURE — 6370000000 HC RX 637 (ALT 250 FOR IP): Performed by: STUDENT IN AN ORGANIZED HEALTH CARE EDUCATION/TRAINING PROGRAM

## 2020-09-23 PROCEDURE — 85027 COMPLETE CBC AUTOMATED: CPT

## 2020-09-23 PROCEDURE — 85610 PROTHROMBIN TIME: CPT

## 2020-09-23 PROCEDURE — 2700000000 HC OXYGEN THERAPY PER DAY

## 2020-09-23 PROCEDURE — 6360000002 HC RX W HCPCS: Performed by: CLINICAL NURSE SPECIALIST

## 2020-09-23 PROCEDURE — 6370000000 HC RX 637 (ALT 250 FOR IP): Performed by: THORACIC SURGERY (CARDIOTHORACIC VASCULAR SURGERY)

## 2020-09-23 RX ORDER — FUROSEMIDE 10 MG/ML
40 INJECTION INTRAMUSCULAR; INTRAVENOUS ONCE
Status: COMPLETED | OUTPATIENT
Start: 2020-09-23 | End: 2020-09-23

## 2020-09-23 RX ORDER — FUROSEMIDE 10 MG/ML
40 INJECTION INTRAMUSCULAR; INTRAVENOUS 2 TIMES DAILY
Status: DISCONTINUED | OUTPATIENT
Start: 2020-09-23 | End: 2020-09-24

## 2020-09-23 RX ADMIN — FUROSEMIDE 40 MG: 10 INJECTION, SOLUTION INTRAMUSCULAR; INTRAVENOUS at 09:38

## 2020-09-23 RX ADMIN — POTASSIUM CHLORIDE 10 MEQ: 750 TABLET, EXTENDED RELEASE ORAL at 16:35

## 2020-09-23 RX ADMIN — KETOROLAC TROMETHAMINE 15 MG: 30 INJECTION, SOLUTION INTRAMUSCULAR at 13:35

## 2020-09-23 RX ADMIN — MUPIROCIN: 20 OINTMENT TOPICAL at 08:34

## 2020-09-23 RX ADMIN — Medication 10 ML: at 07:56

## 2020-09-23 RX ADMIN — POLYETHYLENE GLYCOL (3350) 17 G: 17 POWDER, FOR SOLUTION ORAL at 09:06

## 2020-09-23 RX ADMIN — FENTANYL CITRATE 25 MCG: 50 INJECTION, SOLUTION INTRAMUSCULAR; INTRAVENOUS at 06:38

## 2020-09-23 RX ADMIN — ASPIRIN 325 MG: 325 TABLET, COATED ORAL at 08:40

## 2020-09-23 RX ADMIN — INSULIN LISPRO 2 UNITS: 100 INJECTION, SOLUTION INTRAVENOUS; SUBCUTANEOUS at 21:55

## 2020-09-23 RX ADMIN — VANCOMYCIN HYDROCHLORIDE 1000 MG: 10 INJECTION, POWDER, LYOPHILIZED, FOR SOLUTION INTRAVENOUS at 12:43

## 2020-09-23 RX ADMIN — KETOROLAC TROMETHAMINE 15 MG: 30 INJECTION, SOLUTION INTRAMUSCULAR at 19:44

## 2020-09-23 RX ADMIN — CLOPIDOGREL BISULFATE 75 MG: 75 TABLET ORAL at 08:42

## 2020-09-23 RX ADMIN — Medication 400 MG: at 20:54

## 2020-09-23 RX ADMIN — OXYCODONE HYDROCHLORIDE AND ACETAMINOPHEN 2 TABLET: 5; 325 TABLET ORAL at 00:43

## 2020-09-23 RX ADMIN — ONDANSETRON 4 MG: 2 INJECTION INTRAMUSCULAR; INTRAVENOUS at 01:13

## 2020-09-23 RX ADMIN — VANCOMYCIN HYDROCHLORIDE 1000 MG: 10 INJECTION, POWDER, LYOPHILIZED, FOR SOLUTION INTRAVENOUS at 01:09

## 2020-09-23 RX ADMIN — OXYCODONE HYDROCHLORIDE AND ACETAMINOPHEN 1 TABLET: 5; 325 TABLET ORAL at 09:30

## 2020-09-23 RX ADMIN — ATORVASTATIN CALCIUM 80 MG: 40 TABLET, FILM COATED ORAL at 20:54

## 2020-09-23 RX ADMIN — METOPROLOL TARTRATE 12.5 MG: 25 TABLET, FILM COATED ORAL at 08:42

## 2020-09-23 RX ADMIN — Medication 400 MG: at 08:41

## 2020-09-23 RX ADMIN — CHLORHEXIDINE GLUCONATE 15 ML: 1.2 RINSE ORAL at 08:33

## 2020-09-23 RX ADMIN — AMLODIPINE BESYLATE 5 MG: 5 TABLET ORAL at 08:41

## 2020-09-23 RX ADMIN — POTASSIUM CHLORIDE 10 MEQ: 750 TABLET, EXTENDED RELEASE ORAL at 08:44

## 2020-09-23 RX ADMIN — KETOROLAC TROMETHAMINE 15 MG: 30 INJECTION, SOLUTION INTRAMUSCULAR at 02:10

## 2020-09-23 RX ADMIN — PANTOPRAZOLE SODIUM 40 MG: 40 INJECTION, POWDER, FOR SOLUTION INTRAVENOUS at 07:55

## 2020-09-23 RX ADMIN — POTASSIUM CHLORIDE 10 MEQ: 750 TABLET, EXTENDED RELEASE ORAL at 12:43

## 2020-09-23 RX ADMIN — CHLORHEXIDINE GLUCONATE 15 ML: 1.2 RINSE ORAL at 20:54

## 2020-09-23 RX ADMIN — OXYCODONE HYDROCHLORIDE AND ACETAMINOPHEN 1 TABLET: 5; 325 TABLET ORAL at 12:48

## 2020-09-23 RX ADMIN — MUPIROCIN: 20 OINTMENT TOPICAL at 20:54

## 2020-09-23 RX ADMIN — FUROSEMIDE 40 MG: 10 INJECTION, SOLUTION INTRAMUSCULAR; INTRAVENOUS at 18:29

## 2020-09-23 RX ADMIN — CEFAZOLIN 2 G: 10 INJECTION, POWDER, FOR SOLUTION INTRAVENOUS at 16:16

## 2020-09-23 RX ADMIN — KETOROLAC TROMETHAMINE 15 MG: 30 INJECTION, SOLUTION INTRAMUSCULAR at 07:56

## 2020-09-23 RX ADMIN — OXYCODONE HYDROCHLORIDE AND ACETAMINOPHEN 2 TABLET: 5; 325 TABLET ORAL at 16:36

## 2020-09-23 RX ADMIN — INSULIN GLARGINE 9 UNITS: 100 INJECTION, SOLUTION SUBCUTANEOUS at 21:56

## 2020-09-23 RX ADMIN — Medication 10 ML: at 08:44

## 2020-09-23 RX ADMIN — CEFAZOLIN 2 G: 10 INJECTION, POWDER, FOR SOLUTION INTRAVENOUS at 08:44

## 2020-09-23 RX ADMIN — ONDANSETRON 4 MG: 2 INJECTION INTRAMUSCULAR; INTRAVENOUS at 18:36

## 2020-09-23 RX ADMIN — Medication 10 ML: at 20:54

## 2020-09-23 ASSESSMENT — PULMONARY FUNCTION TESTS: PIF_VALUE: 31

## 2020-09-23 ASSESSMENT — PAIN DESCRIPTION - DESCRIPTORS
DESCRIPTORS: ACHING
DESCRIPTORS: ACHING
DESCRIPTORS: ACHING;DISCOMFORT
DESCRIPTORS: ACHING

## 2020-09-23 ASSESSMENT — PAIN DESCRIPTION - ONSET
ONSET: ON-GOING
ONSET: ON-GOING

## 2020-09-23 ASSESSMENT — PAIN SCALES - GENERAL
PAINLEVEL_OUTOF10: 8
PAINLEVEL_OUTOF10: 2
PAINLEVEL_OUTOF10: 6
PAINLEVEL_OUTOF10: 8
PAINLEVEL_OUTOF10: 6
PAINLEVEL_OUTOF10: 7
PAINLEVEL_OUTOF10: 2
PAINLEVEL_OUTOF10: 6
PAINLEVEL_OUTOF10: 7
PAINLEVEL_OUTOF10: 2
PAINLEVEL_OUTOF10: 7
PAINLEVEL_OUTOF10: 7

## 2020-09-23 ASSESSMENT — PAIN DESCRIPTION - PAIN TYPE
TYPE: ACUTE PAIN;SURGICAL PAIN
TYPE: SURGICAL PAIN

## 2020-09-23 ASSESSMENT — PAIN DESCRIPTION - LOCATION
LOCATION: CHEST
LOCATION: CHEST;STERNUM
LOCATION: CHEST

## 2020-09-23 ASSESSMENT — PAIN DESCRIPTION - FREQUENCY
FREQUENCY: CONTINUOUS

## 2020-09-23 ASSESSMENT — PAIN DESCRIPTION - ORIENTATION
ORIENTATION: MID

## 2020-09-23 ASSESSMENT — PAIN - FUNCTIONAL ASSESSMENT
PAIN_FUNCTIONAL_ASSESSMENT: ACTIVITIES ARE NOT PREVENTED
PAIN_FUNCTIONAL_ASSESSMENT: PREVENTS OR INTERFERES SOME ACTIVE ACTIVITIES AND ADLS
PAIN_FUNCTIONAL_ASSESSMENT: ACTIVITIES ARE NOT PREVENTED

## 2020-09-23 ASSESSMENT — PAIN DESCRIPTION - PROGRESSION
CLINICAL_PROGRESSION: NOT CHANGED
CLINICAL_PROGRESSION: GRADUALLY WORSENING

## 2020-09-23 NOTE — PROGRESS NOTES
Patient remains off of Nitro or any pressor support since 0110. Patients hemodynamics and vitals remains WDL. Patient successfully transitioned at this time. Saxon removed without complications and catheter remains intact. Arterial line removed at this time, manual pressure held for 5 minutes. No redness, swelling, or hematoma noted. Patient tolerated well. Patient assisted to the chair at this time. Chair locked and chair alarm engaged. Call light within reach. Will continue to monitor.

## 2020-09-23 NOTE — PROGRESS NOTES
Physical Therapy and Occupational Therapy  No Treatment    Referral received and chart reviewed. Attempted to see pt for PT/OT this afternoon. Pt found supine in bed. Pt reports \"not having a good day. \"  Pt declines PT/OT at this time. \"I am having a lot of pain right now. And I am tired. I sat up for like 5 hours this morning. \"  Pt plans to ambulate with RN staff later this afternoon/evening. Will follow up in AM for PT/OT evaluations. RN made aware.     Miguel Ortega PT Gume Pimentel OTR/L #[id number]

## 2020-09-23 NOTE — CARE COORDINATION
I spoke with patient at bedside today regarding discharge plans. She will return to home with her significant other. He will be available. 24/7 at d/c and will transport to home. Patient lives in a two level home with 6 steps to enter the home and a flight of steps to get to the second floor where their only bathroom is located. [facility] is following for home care needs. Patient had no services or DME prior to her admission and most likely will not have any DME needs at d/c.      Electronically signed by Heather Catsaneda RN on 9/23/2020 at 4:25 PM  [phone]

## 2020-09-23 NOTE — PLAN OF CARE
Problem: Pain:  Goal: Control of acute pain  Description: Control of acute pain  Outcome: Ongoing  Note: Patient pain being well controlled with torodol and percocet , will continue to monitor for s/s of discomfort     Problem: Skin Integrity:  Goal: Absence of new skin breakdown  Description: Absence of new skin breakdown  Outcome: Ongoing  Note: Skin inspected, sacral heart in place no new areas of breakdown noted continue to monitor for changes

## 2020-09-23 NOTE — PROGRESS NOTES
RESPIRATORY THERAPY ASSESSMENT    Name:  Oli Casas  Medical Record Number:  [id number]  Age: 63 y.o. Gender: female  : 1960  Today's Date:  2020  Room:  7425/6597-13    Assessment     Is the patient being admitted for a COPD or Asthma exacerbation? No   (If yes the patient will be seen every 4 hours for the first 24 hours and then reassessed)    Patient Admission Diagnosis      Allergies  Allergies   Allergen Reactions    Celebrex [Celecoxib]      Patient tolerates Naproxen    Lisinopril Other (See Comments)     Cough       Macrodantin [Nitrofurantoin Macrocrystal] Other (See Comments)     Flu like s/s       Pulmonary History:No history  Home Oxygen Therapy:  room air  Home Respiratory Therapy:None   Current Respiratory Therapy:  albuterol  Treatment Type: IS       Respiratory Severity Index(RSI)   Patients with orders for inhalation medications, oxygen, or any therapeutic treatment modality will be placed on Respiratory Protocol. They will be assessed with the first treatment and at least every 72 hours thereafter. The following severity scale will be used to determine frequency of treatment intervention.     Smoking History: Pulmonary Disease or Smoking History, Greater than 15 pack year = 2    Social History  Social History     Tobacco Use    Smoking status: Current Every Day Smoker     Packs/day: 0.50     Years: 20.00     Pack years: 10.00    Smokeless tobacco: Never Used   Substance Use Topics    Alcohol use: No    Drug use: No       Recent Surgical History: Thoracic or Upper Airway = 3  Past Surgical History  Past Surgical History:   Procedure Laterality Date    CARPAL TUNNEL RELEASE      right  hand    CORONARY ARTERY BYPASS GRAFT Left 2020    CABG x 3, LIMA to LAD w/ 5.2 cm endarterectomy, SVG to OM2, SVG to distal RCA; 40 mm Atriclip 2 to LA appendage; pectoral block per anesthesia; endoscopic SV harvest L thigh performed by Andrez Dennison MD at [facility] SURGERY  2009    right knee  replacement       Level of Consciousness: Alert, Oriented, and Cooperative = 0    Level of Activity: Walking with assistance = 1    Respiratory Pattern: Regular Pattern; RR 8-20 = 0    Breath Sounds: Clear = 0    Sputum  Sputum Color: White, Tenacity: Thick, Sputum How Obtained: Endotracheal, Suctioned  Cough: Strong, spontaneous, non-productive = 0    Vital Signs   /73   Pulse 95   Temp 99.5 °F (37.5 °C) (Core)   Resp 12   Ht 5' 7\" (1.702 m)   Wt 159 lb 9.8 oz (72.4 kg)   SpO2 90%   BMI 25.00 kg/m²   SPO2 (COPD values may differ): 90-91% on room air or greater than 92% on FiO2 24- 28% = 1    Peak Flow (asthma only): not applicable = 0    RSI: 5-6 = Q4hr PRN (every four hours as needed) for dyspnea        Plan       Goals: medication delivery, mobilize retained secretions, volume expansion and improve oxygenation    Patient/caregiver was educated on the proper method of use for Respiratory Care Devices:  Yes      Level of patient/caregiver understanding able to:   ? Verbalize understanding   ? Demonstrate understanding       ? Teach back        ? Needs reinforcement       ? No available caregiver               ? Other:     Response to education:  Excellent     Is patient being placed on Home Treatment Regimen? No     Does the patient have everything they need prior to discharge? NA     Comments: pt in no resp distress, states her breathing feels fine    Plan of Care: albuterol prn, IS as ordered, Oxygen protocol    Electronically signed by Emmy Gunn RCP on 9/23/2020 at 3:39 AM    Respiratory Protocol Guidelines     1. Assessment and treatment by Respiratory Therapy will be initiated for medication and therapeutic interventions upon initiation of aerosolized medication. 2. Physician will be contacted for respiratory rate (RR) greater than 35 breaths per minute.  Therapy will be held for heart rate (HR) greater than 140 beats per minute, pending direction from physician. 3. Bronchodilators will be administered via Metered Dose Inhaler (MDI) with spacer when the following criteria are met:  a. Alert and cooperative     b. HR < 140 bpm  c. RR < 30 bpm                d. Can demonstrate a 2-3 second inspiratory hold  4. Bronchodilators will be administered via Hand Held Nebulizer [facility]) to patients when ANY of the following criteria are met  a. Incognizant or uncooperative          b. Patients treated with HHN at Home        c. Unable to demonstrate proper use of MDI with spacer     d. RR > 30 bpm   5. Bronchodilators will be delivered via Metered Dose Inhaler (MDI), HHN, Aerogen to intubated patients on mechanical ventilation. 6. Inhalation medication orders will be delivered and/or substituted as outlined below. Aerosolized Medications Ordering and Administration Guidelines:    1. All Medications will be ordered by a physician, and their frequency and/or modality will be adjusted as defined by the patients Respiratory Severity Index (RSI) score. 2. If the patient does not have documented COPD, consider discontinuing anticholinergics when RSI is less than 9.  3. If the bronchospasm worsens (increased RSI), then the bronchodilator frequency can be increased to a maximum of every 4 hours. If greater than every 4 hours is required, the physician will be contacted. 4. If the bronchospasm improves, the frequency of the bronchodilator can be decreased, based on the patient's RSI, but not less than home treatment regimen frequency. 5. Bronchodilator(s) will be discontinued if patient has a RSI less than 9 and has received no scheduled or as needed treatment for 72  Hrs. Patients Ordered on a Mucolytic Agent:    1. Must always be administered with a bronchodilator. 2. Discontinue if patient experiences worsened bronchospasm, or secretions have lessened to the point that the patient is able to clear them with a cough.     Anti-inflammatory and Combination Medications:    1. If the patient lacks prior history of lung disease, is not using inhaled anti-inflammatory medication at home, and lacks wheezing by examination or by history for at least 24 hours, contact physician for possible discontinuation.

## 2020-09-23 NOTE — PROGRESS NOTES
Updated Dr. Roselia Cam with patients hemodynamics and current drips. All questions answered at this time and no orders. Will continue to monitor.

## 2020-09-23 NOTE — PROGRESS NOTES
pantoprazole  40 mg Oral Daily    sodium chloride (PF)  10 mL Intravenous Daily    metoprolol tartrate  12.5 mg Oral BID    chlorhexidine  15 mL Mouth/Throat BID    magnesium oxide  400 mg Oral BID    mupirocin   Nasal BID    potassium chloride  10 mEq Oral TID WC    aspirin  325 mg Oral Daily    clopidogrel  75 mg Oral Daily    insulin glargine  0.125 Units/kg Subcutaneous Nightly    insulin lispro  0-12 Units Subcutaneous Q4H    polyethylene glycol  17 g Oral Daily    ketorolac  15 mg Intravenous Q6H    amLODIPine  5 mg Oral Daily    atorvastatin  80 mg Oral Nightly       Infusions:   lactated ringers 50 mL/hr at 09/22/20 1531    sodium chloride Stopped (09/22/20 1850)    DOPamine      milrinone      norepinephrine      nitroGLYCERIN Stopped (09/23/20 0110)    nitroprusside (NIPRIDE) 50 mg in D5W infusion      insulin 2.66 Units/hr (09/23/20 0900)    dextrose       DATA REVIEW:   CBC:   Recent Labs     09/21/20  0425 09/22/20  0415 09/22/20  1419 09/23/20  0508   WBC 6.1 5.9 12.9* 9.8   HGB 12.7 12.9 10.5* 9.9*   HCT 38.9 38.7 31.0* 29.8*   MCV 85.3 84.3 83.5 85.9    258 146 170     BMP:   Recent Labs     09/21/20  0425 09/22/20  0415 09/22/20  1419 09/23/20  0508    139 139 138   K 4.8 4.9 4.5 4.8    104 109 108   CO2 27 28 22 22   GLUCOSE 119* 119* 105* 144*   BUN 23* 20 15 16   CREATININE 0.6 0.6 0.5* 0.5*   CALCIUM 9.2 9.5 8.3 8.4   MG 2.10 2.00 3.00* 2.30     Accucheck Glucoses:   Recent Labs     09/23/20  0418 09/23/20  0513 09/23/20  0614 09/23/20  0710 09/23/20  0801   POCGLU 135* 140* 137* 140* 114*     PT/INR:   Recent Labs     09/22/20  1419 09/23/20  0508   PROTIME 12.3 13.7*   INR 1.06 1.18*     APTT:   Recent Labs     09/22/20  1419   APTT 25.0     ABG:    Lab Results   Component Value Date    PHART 7.287 09/22/2020    PO2ART 113.0 09/22/2020    GAI0COM 49.0 09/22/2020    I1AALMKL 98 09/22/2020    TKA9CIX 25 09/22/2020    AOO5VHI 23.4 09/22/2020    BEART -3 09/22/2020    BEART -4 09/22/2020    BEART -4 09/22/2020    BEART -7 09/22/2020    BEART -2 09/22/2020       ASSESSMENT:   Patient Active Problem List:     Uncontrolled hypertension     Chronic eczematous otitis externa of both ears     Cardiac murmur     Chronic pain syndrome     Chronic neck pain     Chest pain     NSTEMI (non-ST elevated myocardial infarction) ([facility] [address]     Coronary artery disease due to lipid rich plaque     Type 2 diabetes mellitus without complication (HCC)     Hyperlipidemia      Neuro: awake, alert and oriented x 3  CV:   Sinus, pacing wires intact  Pulm:  Diminished in bases, using IS to 250 ml  Abd:  soft, non-tender; bowel sounds hypoactive  Robles: clear yellow  Wounds: clean, dry and intact  Ext: warm, + edema    PLAN:   · CV - on Beta blocker, ace inhibitor, statin, ASA/Plavix   - keep pacing wires  · Pulm - wean O2 as tolerated, continue incentive spirometry and resp tx, add Acapella  · Renal - creatinine stable - start diuresis for postop fluid retention   -  keep urinary catheter for accurate I & O  · Heme - Acute blood loss anemia as expected- hgb 9.9, continue to monitor        · ID - continue perioperative antibiotics  · FEN - cardiac diet with 1500 ml fluid restriction     - dc insulin infusion later today then start SSI  · Remove chest tubes when criteria met, place on waterseal  · VTE prophylaxis - SCD and FOZIA hose  · Disposition -   Transition to progressive care, PT/OT  Patient seen with Dr Leslie Jones who is agreeable to assessment and plan.         Nara Talavera, ROYER  [phone] pager  9/23/2020  9:46 AM

## 2020-09-23 NOTE — OP NOTE
Operative Note      Patient: Jeremiah Rice  YOB: 1960  MRN: [id number]    Date of Procedure: 9/22/2020    Pre-Op Diagnosis: Coronary artery disease with angina pectoris, unspecified vessel or lesion type, unspecified whether native or [facility] [I25.119]    Post-Op Diagnosis: Same       Procedure(s):  CABG x 3, LIMA to LAD w/ 5.2 cm endarterectomy, SVG to OM2, SVG to distal RCA; 40 mm Atriclip 2 to LA appendage; pectoral block per anesthesia; endoscopic SV harvest L thigh    Surgeon(s):  Shu Fonseca MD     Fellow:  Bisi Person MD    Assistant:   Surgical Assistant: Salvador Mei Assistant: Brian Ricks    Anesthesia: General    Estimated Blood Loss (mL): less than 295    Complications: None    Specimens:   ID Type Source Tests Collected by Time Destination   A : A. LAD PLAQUE Tissue Tissue SURGICAL PATHOLOGY Shu Fonseca MD 9/22/2020 1200        Implants:  Implant Name Type Inv. Item Serial No.  Lot No. LRB No. Used Action   CLIP LIGATION ATRICURE FLEX V 40MM Fastener CLIP LIGATION ATRICURE FLEX V 40MM  ATRICURE [id number] Left 1 Implanted         Drains:   Chest Tube 2 Anterior; Left Mediastinal;Pleural 36 Greek (Active)   Suction -20 cm H2O 09/23/20 0800   Chest Tube Airleak No 09/23/20 0800   Drainage Description Bright red;Serosanguinous 09/23/20 0800   Dressing Status Clean;Dry; Intact 09/23/20 0800   Dressing Type Dry dressing 09/23/20 0800   Dressing Change Due 09/24/20 09/23/20 0800   Site Assessment Not assessed 09/23/20 0800   Surrounding Skin Unable to view 09/23/20 0800   Patency Intervention Tip/Tilt 09/23/20 0800   Output (ml) 50 ml 09/23/20 0700       Urethral Catheter Latex 16 fr (Active)   Catheter Indications Perioperative use in selected surgeries including but not limited to urologic, pelvic or need for intraoperative monitoring of urinary output due to prolonged surgery, large volume infusion or need for diuretic therapy in surgery 09/23/20 0800   Site Assessment No urethral drainage 09/23/20 0800   Urine Color Yellow 09/23/20 0800   Urine Appearance Clear 09/23/20 0800   Output (mL) 30 mL 09/23/20 0700        INDICATIONS:  The patient is a 77-year-old woman with a history of smoking who presented to the ED with acute chest pain and work up remarkable for NSTEMI. Left heart catheterization revealed multivessel coronary artery disease. Consent was obtained for elective surgical revascularization and left atrial appendage clipping. FINDINGS AT SURGERY:  Large segment of LAD plaque requiring 52 mm LAD endarterectomy. Pre op ECHO normal EF 50-55%, sclerotic aortic valve without significant gradient, trivial central MR with small bowing/prolapsing segment of P2.      OPERATIVE PROCEDURE: General endotracheal anesthesia was obtained, antibiotics given and the patient was prepped and draped in a sterile fashion. A time out was held confirming the patient and appropriate surgery. The chest was entered via skin incision and median sternotomy. The left internal mammary artery and vein were mobilized off the chest wall and fascia. The mammary artery was divided distally and remained in situ proximally. Simultaneously a segment of saphenous vein was endoscopically harvested from the patient's right leg. Once the mammary and saphenous vein conduits were mobilized, the patient was systemically heparinized. The pericardial well was developed by dividing the thymic remnant and incising the pericardium and suspending the pericardial tissue laterally. The ascending aorta and right atrial appendage were cannulated and cardiopulmonary bypass initiated. Antegrade and retrograde cardioplegia cannula were placed and the distal coronary targets were clearly identified. The aorta was then cross-clamped. Cardioplegia was delivered antegrade to the aortic root followed by retrograde cardioplegia induction via the coronary sinus.  A total of 600 cc cardioplegia was given in each direction. The heart arrested readily and the heart was cool to palpation. Due to difficulty with the retrograde cannula becoming dislodged, additional doses of cardioplegia were given in an antegrade fashion approximately every 20 minutes thereafter. The distal end of the saphenous vein was then prepped and grafted in an end-to-side fashion to distal right coronary artery. A separate segment of vein was grafted in an end-to-side fashion to the second obtuse marginal coronary artery. Each anastomosis was tested with heparinized saline to assess flow. Each graft flowed readily and without resistance. The left atrial appendage was then clipped with a 40 mm clip device. Next the left mammary artery was brought through a lateral tunnel in the pericardium. A coronary arteriotomy was performed along the mid LAD and was heavily calficied. A 52 mm LAD endarterectomy was performed. Next, the left internal mammary artery was grafted to the left anterior descending coronary artery in an end-to-side fashion. Flow was briefly tested down the graft which resulted in brisk flow and readily filling of the coronary. A bulldog was placed to temporarily occlude blood flow down the mammary artery. The patient was rewarmed during this time. The heart was returned to its anatomic position. The proximal portion of the vein graft to the right coronary artery was grafted in an end-to-side fashion to the proximal ascending aorta. Next, the antegrade cardioplegia cannula was removed and the residual aortotomy was used for grafting the proximal saphenous vein graft. The aortic cross-clamp was removed and the aorta and heart were de-aired via the proximal saphenous vein just prior to tying of the vein anastomotic suture. There were no twists or kinks in the vein graft which rested without tension on the heart. The mammary artery bulldog was removed and blood allowed to perfuse the LAD. With the cross-clamp released, the heart spontaneously returned to normal sinus rhythm. All anastomoses and grafts were inspected for bleeding and hemostasis was achieved. The patient was weaned from cardiopulmonary bypass, resumed ventilation, had return to normal sinus rhythm, return to normothermia and was hemodynamically stable. The venous cannula was clamped and removed from the right atrial appendage. The retrograde cannula was removed. Protamine was administered to reverse heparinization. The aortic cannula was clamped and removed, the purse string stitches tied, and the site was over-sewn with 5-0 prolene. All surgical sites within the field were inspected for bleeding and hemostasis was achieved. A single bipolar pacing wire was placed along the anterior surface of the right ventricle. The pericardium was re-approximated with interrupted Vicryl suture. Two 39 Malay chest tubes were placed using an angled chest tube in the left pleural space and a straight chest tube in the mediastinum just anterior to the re-approximated pericardium. The left PAULINE harvest site was closely inspected for bleeding. Bone wax applied to the sternal periosteum. The field was inspected one last time to ensure adequate hemostasis. The sternum was re-approximated with four #6 sternal wires in a figure-of-eight fashion. The fascia was closed with 0-PDS running and the subcutaneous tissue closed with 2-0 Vicryl. The skin was closed with 3-0 vicryl running suture. A Prineo dressing was placed over the wound and Dermabond applied to secure the dressing in place. The patient tolerated the surgery well and was transferred to the ICU intubated and hemodynamically stable. Dr. Cecilia Rice was present and scrubbed for the entirety of the operative case. Nadine Mcadams M.D.   Cardiothoracic Surgery Fellow  Cell: [phone]    Electronically signed by Nadine Mcadams MD on 9/23/2020 at 8:33 AM     I was scrubbed in and participated in all critical portions of this procedure.

## 2020-09-23 NOTE — PROGRESS NOTES
1500- activity     Patient ambulated 75 ft in hallway with 4 wheeled walker ans stand by assistance gait steady , continue to encourage activity level

## 2020-09-23 NOTE — PROGRESS NOTES
NUTRITION NOTE   Admission Date: 9/16/2020     Type and Reason for Visit: Initial, Patient Education    NUTRITION RECOMMENDATIONS:   1. PO Diet: Continue current diet per MD    2. ONS: Not indicated at this time. 3. Diet Education: Provided pt w/written instructions obtained from Nutrition Care Manual. Pt verbalized understanding and awareness of heart healthy eating habits. Encouraged pt to reach out if any questions arise    NUTRITION ASSESSMENT:  RD assessment for LOS, POD 1 CABG. Pt indicated excellent appetite and po intake up until Sx and no other concern has been noted. Weight on admission of 158 lb near UBW, increase of 17 lb r/t volume overload. Currently tolerated cardiac diet, CC 3/ meal, low sodium and fluid restriction. The patient will still be monitored per nutrition standards of care. Consult dietitian if nutrition interventions essential to patient care is needed. MALNUTRITION ASSESSMENT  Context of Malnutrition: Acute Illness   Malnutrition Status: No malnutrition    NUTRITION DIAGNOSIS No nutrition diagnosis at this time.     NUTRITION INTERVENTION  Food and/or Nutrient Delivery:Continue Current diet   Nutrition education/counseling/coordination of care: Continue Inpatient Monitoring  or Education Completed     NUTRITION RISK LEVEL: Risk Level: Yan [employer] LD  Jose Rafael:  [phone]  Office:  [phone]

## 2020-09-24 LAB
ANION GAP SERPL CALCULATED.3IONS-SCNC: 8 MMOL/L (ref 3–16)
BUN BLDV-MCNC: 22 MG/DL (ref 7–20)
CALCIUM SERPL-MCNC: 8.2 MG/DL (ref 8.3–10.6)
CHLORIDE BLD-SCNC: 99 MMOL/L (ref 99–110)
CO2: 25 MMOL/L (ref 21–32)
CREAT SERPL-MCNC: 0.7 MG/DL (ref 0.6–1.2)
GFR AFRICAN AMERICAN: >60
GFR NON-AFRICAN AMERICAN: >60
GLUCOSE BLD-MCNC: 120 MG/DL (ref 70–99)
GLUCOSE BLD-MCNC: 126 MG/DL (ref 70–99)
GLUCOSE BLD-MCNC: 134 MG/DL (ref 70–99)
GLUCOSE BLD-MCNC: 152 MG/DL (ref 70–99)
GLUCOSE BLD-MCNC: 164 MG/DL (ref 70–99)
GLUCOSE BLD-MCNC: 177 MG/DL (ref 70–99)
GLUCOSE BLD-MCNC: 212 MG/DL (ref 70–99)
HCT VFR BLD CALC: 28.8 % (ref 36–48)
HEMOGLOBIN: 9.4 G/DL (ref 12–16)
MAGNESIUM: 2.1 MG/DL (ref 1.8–2.4)
MCH RBC QN AUTO: 28.2 PG (ref 26–34)
MCHC RBC AUTO-ENTMCNC: 32.8 G/DL (ref 31–36)
MCV RBC AUTO: 86.1 FL (ref 80–100)
PDW BLD-RTO: 14.2 % (ref 12.4–15.4)
PERFORMED ON: ABNORMAL
PLATELET # BLD: 160 K/UL (ref 135–450)
PMV BLD AUTO: 9.5 FL (ref 5–10.5)
POTASSIUM SERPL-SCNC: 4.8 MMOL/L (ref 3.5–5.1)
RBC # BLD: 3.34 M/UL (ref 4–5.2)
SODIUM BLD-SCNC: 132 MMOL/L (ref 136–145)
WBC # BLD: 12.1 K/UL (ref 4–11)

## 2020-09-24 PROCEDURE — 2580000003 HC RX 258: Performed by: THORACIC SURGERY (CARDIOTHORACIC VASCULAR SURGERY)

## 2020-09-24 PROCEDURE — 6370000000 HC RX 637 (ALT 250 FOR IP): Performed by: STUDENT IN AN ORGANIZED HEALTH CARE EDUCATION/TRAINING PROGRAM

## 2020-09-24 PROCEDURE — 97162 PT EVAL MOD COMPLEX 30 MIN: CPT

## 2020-09-24 PROCEDURE — 94669 MECHANICAL CHEST WALL OSCILL: CPT

## 2020-09-24 PROCEDURE — 2700000000 HC OXYGEN THERAPY PER DAY

## 2020-09-24 PROCEDURE — 94150 VITAL CAPACITY TEST: CPT

## 2020-09-24 PROCEDURE — 85027 COMPLETE CBC AUTOMATED: CPT

## 2020-09-24 PROCEDURE — 83735 ASSAY OF MAGNESIUM: CPT

## 2020-09-24 PROCEDURE — 97116 GAIT TRAINING THERAPY: CPT

## 2020-09-24 PROCEDURE — 93005 ELECTROCARDIOGRAM TRACING: CPT | Performed by: THORACIC SURGERY (CARDIOTHORACIC VASCULAR SURGERY)

## 2020-09-24 PROCEDURE — 6360000002 HC RX W HCPCS: Performed by: STUDENT IN AN ORGANIZED HEALTH CARE EDUCATION/TRAINING PROGRAM

## 2020-09-24 PROCEDURE — 6370000000 HC RX 637 (ALT 250 FOR IP): Performed by: THORACIC SURGERY (CARDIOTHORACIC VASCULAR SURGERY)

## 2020-09-24 PROCEDURE — 80048 BASIC METABOLIC PNL TOTAL CA: CPT

## 2020-09-24 PROCEDURE — 6360000002 HC RX W HCPCS: Performed by: THORACIC SURGERY (CARDIOTHORACIC VASCULAR SURGERY)

## 2020-09-24 PROCEDURE — 99233 SBSQ HOSP IP/OBS HIGH 50: CPT | Performed by: INTERNAL MEDICINE

## 2020-09-24 PROCEDURE — 2100000000 HC CCU R&B

## 2020-09-24 PROCEDURE — 97166 OT EVAL MOD COMPLEX 45 MIN: CPT

## 2020-09-24 PROCEDURE — 36415 COLL VENOUS BLD VENIPUNCTURE: CPT

## 2020-09-24 PROCEDURE — 97530 THERAPEUTIC ACTIVITIES: CPT

## 2020-09-24 PROCEDURE — 94761 N-INVAS EAR/PLS OXIMETRY MLT: CPT

## 2020-09-24 RX ORDER — FUROSEMIDE 10 MG/ML
60 INJECTION INTRAMUSCULAR; INTRAVENOUS 2 TIMES DAILY
Status: DISCONTINUED | OUTPATIENT
Start: 2020-09-24 | End: 2020-09-26 | Stop reason: HOSPADM

## 2020-09-24 RX ORDER — AMIODARONE HYDROCHLORIDE 200 MG/1
400 TABLET ORAL 2 TIMES DAILY
Status: DISCONTINUED | OUTPATIENT
Start: 2020-09-24 | End: 2020-09-26 | Stop reason: HOSPADM

## 2020-09-24 RX ORDER — AMIODARONE HYDROCHLORIDE 200 MG/1
200 TABLET ORAL DAILY
Status: DISCONTINUED | OUTPATIENT
Start: 2020-09-29 | End: 2020-09-26 | Stop reason: HOSPADM

## 2020-09-24 RX ADMIN — DEXTROSE MONOHYDRATE 150 MG: 50 INJECTION, SOLUTION INTRAVENOUS at 04:45

## 2020-09-24 RX ADMIN — CLOPIDOGREL BISULFATE 75 MG: 75 TABLET ORAL at 09:03

## 2020-09-24 RX ADMIN — ONDANSETRON 4 MG: 2 INJECTION INTRAMUSCULAR; INTRAVENOUS at 06:15

## 2020-09-24 RX ADMIN — OXYCODONE HYDROCHLORIDE AND ACETAMINOPHEN 2 TABLET: 5; 325 TABLET ORAL at 14:57

## 2020-09-24 RX ADMIN — KETOROLAC TROMETHAMINE 15 MG: 30 INJECTION, SOLUTION INTRAMUSCULAR at 00:46

## 2020-09-24 RX ADMIN — FUROSEMIDE 60 MG: 10 INJECTION, SOLUTION INTRAMUSCULAR; INTRAVENOUS at 09:04

## 2020-09-24 RX ADMIN — VANCOMYCIN HYDROCHLORIDE 1000 MG: 10 INJECTION, POWDER, LYOPHILIZED, FOR SOLUTION INTRAVENOUS at 00:46

## 2020-09-24 RX ADMIN — KETOROLAC TROMETHAMINE 15 MG: 30 INJECTION, SOLUTION INTRAMUSCULAR at 08:57

## 2020-09-24 RX ADMIN — POTASSIUM CHLORIDE 10 MEQ: 750 TABLET, EXTENDED RELEASE ORAL at 09:04

## 2020-09-24 RX ADMIN — CHLORHEXIDINE GLUCONATE 15 ML: 1.2 RINSE ORAL at 20:04

## 2020-09-24 RX ADMIN — Medication 400 MG: at 20:04

## 2020-09-24 RX ADMIN — ONDANSETRON 4 MG: 2 INJECTION INTRAMUSCULAR; INTRAVENOUS at 18:03

## 2020-09-24 RX ADMIN — Medication 400 MG: at 09:03

## 2020-09-24 RX ADMIN — CHLORHEXIDINE GLUCONATE 15 ML: 1.2 RINSE ORAL at 09:06

## 2020-09-24 RX ADMIN — Medication 10 ML: at 20:05

## 2020-09-24 RX ADMIN — INSULIN LISPRO 2 UNITS: 100 INJECTION, SOLUTION INTRAVENOUS; SUBCUTANEOUS at 05:56

## 2020-09-24 RX ADMIN — POTASSIUM CHLORIDE 10 MEQ: 750 TABLET, EXTENDED RELEASE ORAL at 18:09

## 2020-09-24 RX ADMIN — AMIODARONE HYDROCHLORIDE 1 MG/MIN: 50 INJECTION, SOLUTION INTRAVENOUS at 05:16

## 2020-09-24 RX ADMIN — ATORVASTATIN CALCIUM 80 MG: 40 TABLET, FILM COATED ORAL at 20:04

## 2020-09-24 RX ADMIN — ASPIRIN 325 MG: 325 TABLET, COATED ORAL at 09:02

## 2020-09-24 RX ADMIN — FENTANYL CITRATE 25 MCG: 50 INJECTION, SOLUTION INTRAMUSCULAR; INTRAVENOUS at 11:27

## 2020-09-24 RX ADMIN — POLYETHYLENE GLYCOL (3350) 17 G: 17 POWDER, FOR SOLUTION ORAL at 09:29

## 2020-09-24 RX ADMIN — KETOROLAC TROMETHAMINE 15 MG: 30 INJECTION, SOLUTION INTRAMUSCULAR at 20:04

## 2020-09-24 RX ADMIN — AMIODARONE HYDROCHLORIDE 400 MG: 200 TABLET ORAL at 09:03

## 2020-09-24 RX ADMIN — CEFAZOLIN 2 G: 10 INJECTION, POWDER, FOR SOLUTION INTRAVENOUS at 00:46

## 2020-09-24 RX ADMIN — OXYCODONE HYDROCHLORIDE AND ACETAMINOPHEN 2 TABLET: 5; 325 TABLET ORAL at 03:45

## 2020-09-24 RX ADMIN — AMIODARONE HYDROCHLORIDE 400 MG: 200 TABLET ORAL at 20:04

## 2020-09-24 RX ADMIN — INSULIN LISPRO 2 UNITS: 100 INJECTION, SOLUTION INTRAVENOUS; SUBCUTANEOUS at 21:57

## 2020-09-24 RX ADMIN — MUPIROCIN: 20 OINTMENT TOPICAL at 09:04

## 2020-09-24 RX ADMIN — INSULIN LISPRO 4 UNITS: 100 INJECTION, SOLUTION INTRAVENOUS; SUBCUTANEOUS at 11:16

## 2020-09-24 RX ADMIN — PANTOPRAZOLE SODIUM 40 MG: 40 TABLET, DELAYED RELEASE ORAL at 09:37

## 2020-09-24 RX ADMIN — FUROSEMIDE 60 MG: 10 INJECTION, SOLUTION INTRAMUSCULAR; INTRAVENOUS at 18:10

## 2020-09-24 RX ADMIN — OXYCODONE HYDROCHLORIDE AND ACETAMINOPHEN 2 TABLET: 5; 325 TABLET ORAL at 10:07

## 2020-09-24 RX ADMIN — AMIODARONE HYDROCHLORIDE 0.5 MG/MIN: 50 INJECTION, SOLUTION INTRAVENOUS at 16:53

## 2020-09-24 RX ADMIN — POTASSIUM CHLORIDE 10 MEQ: 750 TABLET, EXTENDED RELEASE ORAL at 13:05

## 2020-09-24 RX ADMIN — KETOROLAC TROMETHAMINE 15 MG: 30 INJECTION, SOLUTION INTRAMUSCULAR at 14:37

## 2020-09-24 RX ADMIN — MUPIROCIN: 20 OINTMENT TOPICAL at 20:05

## 2020-09-24 ASSESSMENT — PAIN DESCRIPTION - ORIENTATION
ORIENTATION: MID

## 2020-09-24 ASSESSMENT — PAIN DESCRIPTION - ONSET
ONSET: ON-GOING
ONSET: ON-GOING

## 2020-09-24 ASSESSMENT — PAIN - FUNCTIONAL ASSESSMENT
PAIN_FUNCTIONAL_ASSESSMENT: ACTIVITIES ARE NOT PREVENTED

## 2020-09-24 ASSESSMENT — PAIN SCALES - GENERAL
PAINLEVEL_OUTOF10: 7
PAINLEVEL_OUTOF10: 7
PAINLEVEL_OUTOF10: 0
PAINLEVEL_OUTOF10: 8
PAINLEVEL_OUTOF10: 0
PAINLEVEL_OUTOF10: 8
PAINLEVEL_OUTOF10: 0
PAINLEVEL_OUTOF10: 8
PAINLEVEL_OUTOF10: 7

## 2020-09-24 ASSESSMENT — PAIN DESCRIPTION - LOCATION
LOCATION: CHEST

## 2020-09-24 ASSESSMENT — PAIN DESCRIPTION - DESCRIPTORS
DESCRIPTORS: ACHING;DISCOMFORT
DESCRIPTORS: ACHING
DESCRIPTORS: ACHING;DISCOMFORT
DESCRIPTORS: ACHING

## 2020-09-24 ASSESSMENT — PAIN DESCRIPTION - PROGRESSION
CLINICAL_PROGRESSION: NOT CHANGED
CLINICAL_PROGRESSION: GRADUALLY WORSENING

## 2020-09-24 ASSESSMENT — PAIN DESCRIPTION - PAIN TYPE
TYPE: SURGICAL PAIN

## 2020-09-24 ASSESSMENT — PAIN DESCRIPTION - FREQUENCY
FREQUENCY: INTERMITTENT

## 2020-09-24 NOTE — PLAN OF CARE
Problem: Pain:  Goal: Pain level will decrease  Description: Pain level will decrease  Outcome: Ongoing  Goal: Control of acute pain  Description: Control of acute pain  9/23/2020 2150 by Medina Muñoz RN  Outcome: Ongoing  9/23/2020 0955 by Shai Pollard RN  Outcome: Ongoing  Note: Patient pain being well controlled with torodol and percocet , will continue to monitor for s/s of discomfort  Goal: Control of chronic pain  Description: Control of chronic pain  Outcome: Ongoing     Problem: HEMODYNAMIC STATUS  Goal: Patient has stable vital signs and fluid balance  Outcome: Ongoing     Problem: FLUID AND ELECTROLYTE IMBALANCE  Goal: Fluid and electrolyte balance are achieved/maintained  Outcome: Ongoing     Problem: OXYGENATION/RESPIRATORY FUNCTION  Goal: Patient will achieve/maintain normal respiratory rate/effort  Description: Respiratory rate and effort will be within normal limits for the patient  Outcome: Ongoing     Problem: PSYCHOSOCIAL NEEDS  Goal: Demonstrates ability to cope with illness  Outcome: Ongoing     Problem: Cardiac Output - Decreased:  Goal: Hemodynamic stability will improve  Description: Hemodynamic stability will improve  Outcome: Ongoing     Problem: Bleeding:  Goal: Will show no signs and symptoms of excessive bleeding  Description: Will show no signs and symptoms of excessive bleeding  Outcome: Ongoing     Problem: Discharge Planning:  Goal: Discharged to appropriate level of care  Description: Discharged to appropriate level of care  Outcome: Ongoing     Problem:  Activity Intolerance:  Goal: Able to perform prescribed physical activity  Description: Able to perform prescribed physical activity  Outcome: Ongoing  Goal: Ability to tolerate increased activity will improve  Description: Ability to tolerate increased activity will improve  Outcome: Ongoing     Problem: Anxiety:  Goal: Level of anxiety will decrease  Description: Level of anxiety will decrease  Outcome: Ongoing     Problem: Fluid Volume - Imbalance:  Goal: Ability to achieve a balanced intake and output will improve  Description: Ability to achieve a balanced intake and output will improve  Outcome: Ongoing  Goal: Chest tube drainage is within specified parameters  Description: Chest tube drainage is within specified parameters  Outcome: Ongoing     Problem: Skin Integrity:  Goal: Will show no infection signs and symptoms  Description: Will show no infection signs and symptoms  Outcome: Ongoing  Goal: Absence of new skin breakdown  Description: Absence of new skin breakdown  9/23/2020 2150 by Matt Douglas RN  Outcome: Ongoing  9/23/2020 0955 by Umberto Rae RN  Outcome: Ongoing  Note: Skin inspected, sacral heart in place no new areas of breakdown noted continue to monitor for changes

## 2020-09-24 NOTE — CONSULTS
Nutrition Education    Consult received for  Diet Education. Please refer to dietitian progress note and patient education for more information. education completed on 9/23. Will continue to monitor per nutrition standards of care.      Electronically signed by Geno Sheets RD, LD on 9/24/20 at 8:15 AM EDT    Contact: [phone]

## 2020-09-24 NOTE — CARE COORDINATION
Patient was up with therapy today and per notes would not be ready for home yet but should improve each day. Pt needed a rolling walker with therapy today but may not need one at d/c. Will continue to follow. I did speak with Ramila Goodman with [facility] care and she is following but was not sure if they are in network. I asked her to check by tomorrow because if I need to find another company I would like to get this arranged before the weekend.      Electronically signed by Dale Jorge RN on 9/24/2020 at 2:40 PM  [phone]

## 2020-09-24 NOTE — PROGRESS NOTES
Progress Note  Hospital Day: 9  POD#  2  S/P Coronary artery bypass x 3, LAD endarterectomy + LAAA clip (2020)    Chief Complaint: Postop follow up    Ms. Deepti Mcduffie is resting in bed, has ambulated the halls several times yesterday, pain controlled. 24 hour Interval History:   Afib last night, RVR to 150, amiodarone protocol started, rate controlled this morning  Pacing wires and sotomayor removed    VITAL SIGNS   Temperature:  Current - Temp: 98.4 °F (36.9 °C); Max - Temp  Av.3 °F (36.8 °C)  Min: 97.8 °F (36.6 °C)  Max: 98.5 °F (36.9 °C)    Respiratory Rate : Resp  Av.8  Min: 12  Max: 28    Pulse Range: Pulse  Av.8  Min: 80  Max: 122    Blood Pressure Range:  Systolic (82ILT), XSB:298 , Min:89 , VEW:794   ; Diastolic (40LGF), TGP:78, Min:60, Max:82    Pulse ox Range: SpO2  Av.4 %  Min: 76 %  Max: 100 %  O2 Flow Rate (L/min): 3 L/min    CVP (Mean): 21 mmHg  PAP: 38/21  PAP (Mean): 28 mmHg  SVR (Using ABP Mean): 1130.43 dyne*sec/cm5  CCI: 2.5 L/min    Admission Weight: Weight: 159 lb (72.1 kg)    Current Weight: up 17 lbs from preop  Patient Vitals for the past 96 hrs (Last 3 readings):   Weight   20 0600 177 lb 4 oz (80.4 kg)   20 0700 176 lb 12.9 oz (80.2 kg)   20 0526 159 lb 9.8 oz (72.4 kg)     I/O:     Intake/Output Summary (Last 24 hours) at 2020 0656  Last data filed at 2020 0600  Gross per 24 hour   Intake 2144 ml   Output 1785 ml   Net 359 ml     Urine (sotomayor): [phone] ml/shift  Chest tube: 100-  ml/shift  serosanguinous, on suction, no leak    LINES/ACCESS:   Central Access: RIJ Day #: 1   Peripheral Access: Rt AC Day #: 7                                   Chest tubes #2      Diet: DIET CARDIAC; Carb Control: 3 carb choices (45 gms)/meal; No Added Salt (3-4 GM);  Daily Fluid Restriction: 1500 ml    MEDICATIONS:      amiodarone  400 mg Oral BID    Followed by   [facility] ON 2020] amiodarone  200 mg Oral Daily    furosemide  40 mg Intravenous BID    sodium chloride flush  10 mL Intravenous 2 times per day    pantoprazole  40 mg Oral Daily    metoprolol tartrate  12.5 mg Oral BID    chlorhexidine  15 mL Mouth/Throat BID    magnesium oxide  400 mg Oral BID    mupirocin   Nasal BID    potassium chloride  10 mEq Oral TID     aspirin  325 mg Oral Daily    clopidogrel  75 mg Oral Daily    insulin lispro  0-12 Units Subcutaneous Q4H    polyethylene glycol  17 g Oral Daily    ketorolac  15 mg Intravenous Q6H    [Held by provider] amLODIPine  5 mg Oral Daily    atorvastatin  80 mg Oral Nightly       Infusions:   amiodarone 450mg/250ml D5W infusion 1 mg/min (09/24/20 0516)    Followed by   [facility] amiodarone 450mg/250ml D5W infusion      DOPamine      norepinephrine      dextrose       DATA REVIEW:   CBC:   Recent Labs     09/22/20  0415  09/22/20  1241 09/22/20  1325 09/22/20  1419 09/23/20  0508 09/24/20  0400   WBC 5.9  --   --   --  12.9* 9.8 12.1*   HGB 12.9   < > 7.5* 7.4* 10.5* 9.9* 9.4*   HCT 38.7  --   --   --  31.0* 29.8* 28.8*   MCV 84.3  --   --   --  83.5 85.9 86.1     --   --   --  146 170 160    < > = values in this interval not displayed.      BMP:   Recent Labs     09/22/20  0415 09/22/20  1419 09/23/20  0508 09/24/20  0400    139 138 132*   K 4.9 4.5 4.8 4.8    109 108 99   CO2 28 22 22 25   GLUCOSE 119* 105* 144* 164*   BUN 20 15 16 22*   CREATININE 0.6 0.5* 0.5* 0.7   CALCIUM 9.5 8.3 8.4 8.2*   MG 2.00 3.00* 2.30 2.10     Accucheck Glucoses:   Recent Labs     09/23/20  1700 09/23/20  1800 09/23/20  2154 09/24/20  0103 09/24/20  0555   POCGLU 132* 119* 179* 134* 177*     PT/INR:   Recent Labs     09/22/20  1419 09/23/20  0508   PROTIME 12.3 13.7*   INR 1.06 1.18*     APTT:   Recent Labs     09/22/20  1419   APTT 25.0     ABG:    Lab Results   Component Value Date    PHART 7.287 09/22/2020    PO2ART 113.0 09/22/2020    MIL0VBD 49.0 09/22/2020    P0AVOTHB 98 09/22/2020    TWU3SQJ 25 09/22/2020    DAO0JQB 23.4 09/22/2020    BEART -3 09/22/2020    BEART -4 09/22/2020    BEART -4 09/22/2020    BEART -7 09/22/2020    BEART -2 09/22/2020       ASSESSMENT:   Patient Active Problem List:     Uncontrolled hypertension     Chronic eczematous otitis externa of both ears     Cardiac murmur     Chronic pain syndrome     Chronic neck pain     Chest pain     NSTEMI (non-ST elevated myocardial infarction) ([facility]     Coronary artery disease due to lipid rich plaque     Type 2 diabetes mellitus without complication (HCC)     Hyperlipidemia      Neuro: awake, alert and oriented x 3  CV:   Afib rate 110  Pulm:  Diminished in bases, on 1L NC  Abd:  soft, non-tender; bowel sounds hypoactive  Robles: clear yellow  Wounds: clean, dry and intact  Ext: warm, + edema    PLAN:   · Neuro: pain controlled  · Toradol, Percocet  · CV - on Beta blocker, norvasc, statin, ASA/Plavix  · -Amio infusion plus PO for Afib  ·  Pacing wires removed  · Hold norvasc  · Pulm - wean O2 as tolerated, continue incentive spirometry and resp tx, add Acapella  · Renal - creatinine stable 0.7, 1.3L UOP  · Weight 177 lb, pre op 159 lbs  · diuresis for postop fluid retention, increase lasix 60 BID IV  · Robles discontinued  · Heme - Acute blood loss anemia as expected- hgb 9.4, continue to monitor        · ID - continue perioperative antibiotics  · FEN - cardiac diet with 1500 ml fluid restriction     - SSI  · Remove chest tubes today  · VTE prophylaxis - SCD and FOZIA hose  · Disposition -   Transition to progressive care, PT/OT  Patient seen with Dr Mariana Escamilla who is agreeable to assessment and plan. Angi Maradiaga M.D.   Cardiothoracic Surgery Fellow  Cell: [phone]

## 2020-09-24 NOTE — PROGRESS NOTES
Occupational Therapy   Occupational Therapy Initial Assessment &Treatment Note  Date: 2020   Patient Name: David Cheng  MRN: [id number]     : 1960    Date of Service: 2020    Discharge Recommendations: David Cheng scored a 18/24 on the AM-PAC ADL Inpatient form. Current research shows that an AM-PAC score of 18 or greater is typically associated with a discharge to the patient's home setting. Based on the patient's AM-PAC score, and their current ADL deficits, it is recommended that the patient have 2-3 sessions per week of Occupational Therapy at d/c to increase the patient's independence. At this time, this patient demonstrates the endurance and safety to discharge home and a follow up treatment frequency of 2-3x/wk. Please see assessment section for further patient specific details. If patient discharges prior to next session this note will serve as a discharge summary. Please see below for the latest assessment towards goals. OT Equipment Recommendations  Equipment Needed: No    Assessment   Performance deficits / Impairments: Decreased functional mobility ; Decreased ADL status; Decreased endurance  Assessment: Pt from home with significant other. PLOF independent in ADLs and IADLs. Pt demo functioning below baseline for simple ADLs and IADLs. Pt demo functional mobility to/from the bathroom with rollator, with CGA. Pt req SBA and verbal cues to adhere to precautions for sit<>stand transfers. Pt limited 2/2 decrease activity tolerance and pain. Anticipated good progress once pain has resolved and chest tube is removed. Will follow as inpt to maximize functional independence level. Prognosis: Good  Decision Making: Low Complexity  OT Education: OT Role;Plan of Care;Transfer Training  Patient Education: verbalized understanding  REQUIRES OT FOLLOW UP: Yes  Activity Tolerance  Activity Tolerance: Patient limited by pain; Patient limited by fatigue  Activity Tolerance: Pt Comments: Pt very independent prior to admit. Objective  Treatment included functional transfer training, ADL's and pt. education. Vision: Within Functional Limits  Hearing: Within functional limits    Orientation  Overall Orientation Status: Within Functional Limits     Balance  Sitting Balance: Supervision  Standing Balance: Contact guard assistance  Standing Balance  Time: ~1 min total  Activity: stance; functional mobility  Functional Mobility  Functional - Mobility Device: Other(rollator)  Activity: To/from bathroom  Assist Level: Contact guard assistance  Functional Mobility Comments: Pt stated she felt dizzy and BP dropped to 74/67, however recovered slightly with rest break. Toilet Transfers  Toilet - Technique: Ambulating(with rollator)  Equipment Used: Standard toilet  Toilet Transfer: Stand by assistance  Toilet Transfers Comments: v. cues for rollator  ADL  Feeding: Beverage management; Independent  Grooming: Supervision;Setup; Increased time to complete  LE Dressing: Dependent/Total;Increased time to complete(don socks)  Toileting: Dependent/Total(catheter)  Tone RUE  RUE Tone: Normotonic  Tone LUE  LUE Tone: Normotonic  Coordination  Movements Are Fluid And Coordinated: Yes  Coordination and Movement description: Right UE;Left UE     Bed mobility  Rolling to Left: Moderate assistance  Supine to Sit: Moderate assistance  Comment: HOB elevated  Transfers  Sit to stand: Stand by assistance  Stand to sit: Stand by assistance  Transfer Comments: v. cues for sternal precautions     Cognition  Overall Cognitive Status: WFL      LUE AROM (degrees)  LUE AROM : WFL  Left Hand AROM (degrees)  Left Hand AROM: WFL  RUE AROM (degrees)  RUE AROM : WFL  Right Hand AROM (degrees)  Right Hand AROM: WFL  LUE Strength  Gross LUE Strength: WFL  L Hand General: 4/5  RUE Strength  Gross RUE Strength: WFL  R Hand General: 4/5     Plan   Plan  Times per week: 2-5x  Times per day: Daily  Current Treatment Recommendations:

## 2020-09-24 NOTE — PROGRESS NOTES
3867)  AM-PAC Inpatient T-Scale Score : 40.78 (09/24/20 0950)  Mobility Inpatient CMS 0-100% Score: 54.16 (09/24/20 0950)  Mobility Inpatient CMS G-Code Modifier : CK (09/24/20 0950)    Goals  Short term goals  Time Frame for Short term goals: Discharge  Short term goal 1: supine <> sit SBA  Short term goal 2: sit <> stand supervision  Short term goal 3: ambulate 200ft with rolling walker supervision  Short term goal 4: ambulate up/down flight of steps with rail SBA  Patient Goals   Patient goals : Return home       Therapy Time   Individual Concurrent Group Co-treatment   Time In 0801         Time Out 0832         Minutes 31         Timed Code Treatment Minutes:  15  Total Treatment Minutes:  Keya 4499, PT

## 2020-09-24 NOTE — PROGRESS NOTES
0330 patient's HR into 130s, irregular, no P waves noted, EKG confirming a fib RVR. Amio protocol initiated. Patient with increased pain at this time as she has been coughing. PRN pain medication administered per order. Will continue to monitor closely.

## 2020-09-24 NOTE — PROGRESS NOTES
[address] Daily Progress Note      Admit Date:  9/16/2020    Subjective:  Ms. Christine Kitchen was seen and examined. F/U Non st/CAD/CABG and now afib.  afib overnight. She is feeling better. Pain control reasonable.      Objective:   /69   Pulse 105   Temp 97.9 °F (36.6 °C) (Oral)   Resp 27   Ht 5' 7\" (1.702 m)   Wt 177 lb 4 oz (80.4 kg)   SpO2 93%   BMI 27.76 kg/m²       Intake/Output Summary (Last 24 hours) at 9/24/2020 1354  Last data filed at 9/24/2020 1000  Gross per 24 hour   Intake 1135 ml   Output 1875 ml   Net -740 ml       TELEMETRY: Atrial fibrillation     Physical Exam:  General:  Awake, alert, NAD  Skin:  Warm and dry  Neck:  JVP difficult  Chest:  Decreased BS, respiration normal  Cardiovascular:  Irreg/irreg S1S2  Abdomen:  Soft nontender  Extremities:  tr edema    Medications:    amiodarone  400 mg Oral BID    Followed by   Gabe Carmen ON 9/29/2020] amiodarone  200 mg Oral Daily    furosemide  60 mg Intravenous BID    sodium chloride flush  10 mL Intravenous 2 times per day    pantoprazole  40 mg Oral Daily    metoprolol tartrate  12.5 mg Oral BID    chlorhexidine  15 mL Mouth/Throat BID    magnesium oxide  400 mg Oral BID    mupirocin   Nasal BID    potassium chloride  10 mEq Oral TID WC    aspirin  325 mg Oral Daily    clopidogrel  75 mg Oral Daily    insulin lispro  0-12 Units Subcutaneous Q4H    polyethylene glycol  17 g Oral Daily    ketorolac  15 mg Intravenous Q6H    [Held by provider] amLODIPine  5 mg Oral Daily    atorvastatin  80 mg Oral Nightly      amiodarone 450mg/250ml D5W infusion 0.5 mg/min (09/24/20 1009)    DOPamine      norepinephrine      dextrose       sodium chloride flush, magnesium sulfate, acetaminophen, oxyCODONE-acetaminophen **OR** oxyCODONE-acetaminophen, fentanNYL, diphenhydrAMINE, ondansetron, metoclopramide, hydrALAZINE, metoprolol, DOPamine, norepinephrine, glucose, dextrose, glucagon (rDNA), dextrose, albuterol, polyethylene glycol, promethazine **OR** [DISCONTINUED] ondansetron    Lab Data:  CBC:   Recent Labs     09/22/20  1419 09/23/20  0508 09/24/20  0400   WBC 12.9* 9.8 12.1*   HGB 10.5* 9.9* 9.4*   HCT 31.0* 29.8* 28.8*   MCV 83.5 85.9 86.1    170 160     BMP:   Recent Labs     09/22/20  1419 09/23/20  0508 09/24/20  0400    138 132*   K 4.5 4.8 4.8    108 99   CO2 22 22 25   BUN 15 16 22*   CREATININE 0.5* 0.5* 0.7     LIVER PROFILE: No results for input(s): AST, ALT, LIPASE, BILIDIR, BILITOT, ALKPHOS in the last 72 hours. Invalid input(s): AMYLASE,  ALB  PT/INR:   Recent Labs     09/22/20  1419 09/23/20  0508   PROTIME 12.3 13.7*   INR 1.06 1.18*     APTT:   Recent Labs     09/22/20  1419   APTT 25.0     BNP:  No results for input(s): BNP in the last 72 hours. IMAGING:     Assessment:  Patient Active Problem List    Diagnosis Date Noted    Hyperlipidemia     Coronary artery disease due to lipid rich plaque     Type 2 diabetes mellitus without complication ([facility] [address]     NSTEMI (non-ST elevated myocardial infarction) ([facility] [address] 09/16/2020    Chest pain 02/27/2020    Uncontrolled hypertension 08/29/2018    Chronic eczematous otitis externa of both ears 08/29/2018    Cardiac murmur 08/29/2018    Chronic pain syndrome 08/29/2018    Chronic neck pain 08/29/2018       Plan:  1. Feeling better. Afib through night. amio started. Diuresis in progress. Increase activities. CT out.        Core Measures:  · Discharge instructions:   · LVEF documented:   · ACEI for LV dysfunction:   · Smoking Cessation:    Tamy Bazan MD 9/24/2020 1:54 PM

## 2020-09-24 NOTE — PROGRESS NOTES
9145 Attempted to ambulate patient. Patient c/o nausea while sitting on side of bed. PRN zofran given per order. Patient ambulated to door of room, but could not go further because of continued c/o nausea. Standing weight 80.4 kg. Returned patient to bed. Patient remains in A fib and on amio gtt per order. PO amio set for 9 AM. O2 sat 90% on 3L NC. SBP remains soft at 98. Percocet administered once overnight. Chest tubes with very minimal output overnight, but 200 ml out while patient ambulating. Output documented in flowsheets. No needs at this time. Will continue to monitor closely.

## 2020-09-25 LAB
ANION GAP SERPL CALCULATED.3IONS-SCNC: 9 MMOL/L (ref 3–16)
BUN BLDV-MCNC: 27 MG/DL (ref 7–20)
CALCIUM SERPL-MCNC: 8.3 MG/DL (ref 8.3–10.6)
CHLORIDE BLD-SCNC: 100 MMOL/L (ref 99–110)
CO2: 27 MMOL/L (ref 21–32)
CREAT SERPL-MCNC: 0.7 MG/DL (ref 0.6–1.2)
GFR AFRICAN AMERICAN: >60
GFR NON-AFRICAN AMERICAN: >60
GLUCOSE BLD-MCNC: 105 MG/DL (ref 70–99)
GLUCOSE BLD-MCNC: 115 MG/DL (ref 70–99)
GLUCOSE BLD-MCNC: 129 MG/DL (ref 70–99)
GLUCOSE BLD-MCNC: 153 MG/DL (ref 70–99)
GLUCOSE BLD-MCNC: 153 MG/DL (ref 70–99)
GLUCOSE BLD-MCNC: 97 MG/DL (ref 70–99)
HCT VFR BLD CALC: 27.4 % (ref 36–48)
HEMOGLOBIN: 9.1 G/DL (ref 12–16)
MAGNESIUM: 2.3 MG/DL (ref 1.8–2.4)
MCH RBC QN AUTO: 28 PG (ref 26–34)
MCHC RBC AUTO-ENTMCNC: 33.3 G/DL (ref 31–36)
MCV RBC AUTO: 84.1 FL (ref 80–100)
PDW BLD-RTO: 13.7 % (ref 12.4–15.4)
PERFORMED ON: ABNORMAL
PERFORMED ON: NORMAL
PLATELET # BLD: 193 K/UL (ref 135–450)
PMV BLD AUTO: 9.1 FL (ref 5–10.5)
POTASSIUM SERPL-SCNC: 5.1 MMOL/L (ref 3.5–5.1)
RBC # BLD: 3.26 M/UL (ref 4–5.2)
SODIUM BLD-SCNC: 136 MMOL/L (ref 136–145)
WBC # BLD: 11 K/UL (ref 4–11)

## 2020-09-25 PROCEDURE — 80048 BASIC METABOLIC PNL TOTAL CA: CPT

## 2020-09-25 PROCEDURE — 6370000000 HC RX 637 (ALT 250 FOR IP): Performed by: THORACIC SURGERY (CARDIOTHORACIC VASCULAR SURGERY)

## 2020-09-25 PROCEDURE — 6370000000 HC RX 637 (ALT 250 FOR IP): Performed by: STUDENT IN AN ORGANIZED HEALTH CARE EDUCATION/TRAINING PROGRAM

## 2020-09-25 PROCEDURE — 99232 SBSQ HOSP IP/OBS MODERATE 35: CPT | Performed by: INTERNAL MEDICINE

## 2020-09-25 PROCEDURE — 36415 COLL VENOUS BLD VENIPUNCTURE: CPT

## 2020-09-25 PROCEDURE — 85027 COMPLETE CBC AUTOMATED: CPT

## 2020-09-25 PROCEDURE — 2700000000 HC OXYGEN THERAPY PER DAY

## 2020-09-25 PROCEDURE — 94150 VITAL CAPACITY TEST: CPT

## 2020-09-25 PROCEDURE — 6360000002 HC RX W HCPCS: Performed by: STUDENT IN AN ORGANIZED HEALTH CARE EDUCATION/TRAINING PROGRAM

## 2020-09-25 PROCEDURE — 2580000003 HC RX 258: Performed by: THORACIC SURGERY (CARDIOTHORACIC VASCULAR SURGERY)

## 2020-09-25 PROCEDURE — 93005 ELECTROCARDIOGRAM TRACING: CPT | Performed by: THORACIC SURGERY (CARDIOTHORACIC VASCULAR SURGERY)

## 2020-09-25 PROCEDURE — 2100000000 HC CCU R&B

## 2020-09-25 PROCEDURE — 94761 N-INVAS EAR/PLS OXIMETRY MLT: CPT

## 2020-09-25 PROCEDURE — 83735 ASSAY OF MAGNESIUM: CPT

## 2020-09-25 PROCEDURE — 94669 MECHANICAL CHEST WALL OSCILL: CPT

## 2020-09-25 RX ORDER — INSULIN LISPRO 100 [IU]/ML
0-12 INJECTION, SOLUTION INTRAVENOUS; SUBCUTANEOUS
Status: DISCONTINUED | OUTPATIENT
Start: 2020-09-25 | End: 2020-09-26 | Stop reason: HOSPADM

## 2020-09-25 RX ORDER — LACTULOSE 10 G/15ML
10 SOLUTION ORAL ONCE
Status: COMPLETED | OUTPATIENT
Start: 2020-09-25 | End: 2020-09-25

## 2020-09-25 RX ORDER — POTASSIUM CHLORIDE 750 MG/1
10 TABLET, EXTENDED RELEASE ORAL 2 TIMES DAILY
Status: DISCONTINUED | OUTPATIENT
Start: 2020-09-25 | End: 2020-09-26 | Stop reason: HOSPADM

## 2020-09-25 RX ADMIN — KETOROLAC TROMETHAMINE 15 MG: 30 INJECTION, SOLUTION INTRAMUSCULAR at 01:50

## 2020-09-25 RX ADMIN — OXYCODONE HYDROCHLORIDE AND ACETAMINOPHEN 2 TABLET: 5; 325 TABLET ORAL at 05:16

## 2020-09-25 RX ADMIN — POLYETHYLENE GLYCOL (3350) 17 G: 17 POWDER, FOR SOLUTION ORAL at 08:22

## 2020-09-25 RX ADMIN — ATORVASTATIN CALCIUM 80 MG: 40 TABLET, FILM COATED ORAL at 20:22

## 2020-09-25 RX ADMIN — Medication 10 ML: at 21:52

## 2020-09-25 RX ADMIN — ASPIRIN 325 MG: 325 TABLET, COATED ORAL at 08:22

## 2020-09-25 RX ADMIN — LACTULOSE 10 G: 20 SOLUTION ORAL at 08:21

## 2020-09-25 RX ADMIN — CHLORHEXIDINE GLUCONATE 15 ML: 1.2 RINSE ORAL at 20:23

## 2020-09-25 RX ADMIN — PANTOPRAZOLE SODIUM 40 MG: 40 TABLET, DELAYED RELEASE ORAL at 08:22

## 2020-09-25 RX ADMIN — AMIODARONE HYDROCHLORIDE 400 MG: 200 TABLET ORAL at 20:22

## 2020-09-25 RX ADMIN — FUROSEMIDE 60 MG: 10 INJECTION, SOLUTION INTRAMUSCULAR; INTRAVENOUS at 08:22

## 2020-09-25 RX ADMIN — OXYCODONE HYDROCHLORIDE AND ACETAMINOPHEN 1 TABLET: 5; 325 TABLET ORAL at 14:07

## 2020-09-25 RX ADMIN — Medication 10 ML: at 08:29

## 2020-09-25 RX ADMIN — MUPIROCIN: 20 OINTMENT TOPICAL at 20:23

## 2020-09-25 RX ADMIN — INSULIN LISPRO 2 UNITS: 100 INJECTION, SOLUTION INTRAVENOUS; SUBCUTANEOUS at 01:51

## 2020-09-25 RX ADMIN — POTASSIUM CHLORIDE 10 MEQ: 750 TABLET, EXTENDED RELEASE ORAL at 20:22

## 2020-09-25 RX ADMIN — INSULIN LISPRO 2 UNITS: 100 INJECTION, SOLUTION INTRAVENOUS; SUBCUTANEOUS at 11:30

## 2020-09-25 RX ADMIN — FUROSEMIDE 60 MG: 10 INJECTION, SOLUTION INTRAMUSCULAR; INTRAVENOUS at 18:55

## 2020-09-25 RX ADMIN — POTASSIUM CHLORIDE 10 MEQ: 750 TABLET, EXTENDED RELEASE ORAL at 08:22

## 2020-09-25 RX ADMIN — CHLORHEXIDINE GLUCONATE 15 ML: 1.2 RINSE ORAL at 08:21

## 2020-09-25 RX ADMIN — Medication 400 MG: at 20:22

## 2020-09-25 RX ADMIN — METOPROLOL TARTRATE 12.5 MG: 25 TABLET, FILM COATED ORAL at 20:22

## 2020-09-25 RX ADMIN — MUPIROCIN: 20 OINTMENT TOPICAL at 08:25

## 2020-09-25 RX ADMIN — CLOPIDOGREL BISULFATE 75 MG: 75 TABLET ORAL at 08:21

## 2020-09-25 RX ADMIN — OXYCODONE HYDROCHLORIDE AND ACETAMINOPHEN 1 TABLET: 5; 325 TABLET ORAL at 10:19

## 2020-09-25 RX ADMIN — Medication 400 MG: at 08:22

## 2020-09-25 RX ADMIN — OXYCODONE HYDROCHLORIDE AND ACETAMINOPHEN 1 TABLET: 5; 325 TABLET ORAL at 20:22

## 2020-09-25 RX ADMIN — AMIODARONE HYDROCHLORIDE 400 MG: 200 TABLET ORAL at 08:22

## 2020-09-25 ASSESSMENT — PAIN DESCRIPTION - ONSET
ONSET: ON-GOING

## 2020-09-25 ASSESSMENT — PAIN DESCRIPTION - ORIENTATION
ORIENTATION: MID

## 2020-09-25 ASSESSMENT — PAIN DESCRIPTION - FREQUENCY
FREQUENCY: INTERMITTENT

## 2020-09-25 ASSESSMENT — PAIN SCALES - GENERAL
PAINLEVEL_OUTOF10: 0
PAINLEVEL_OUTOF10: 6
PAINLEVEL_OUTOF10: 0
PAINLEVEL_OUTOF10: 0
PAINLEVEL_OUTOF10: 3
PAINLEVEL_OUTOF10: 7
PAINLEVEL_OUTOF10: 7
PAINLEVEL_OUTOF10: 0
PAINLEVEL_OUTOF10: 7
PAINLEVEL_OUTOF10: 7

## 2020-09-25 ASSESSMENT — PAIN DESCRIPTION - LOCATION
LOCATION: CHEST

## 2020-09-25 ASSESSMENT — PAIN - FUNCTIONAL ASSESSMENT
PAIN_FUNCTIONAL_ASSESSMENT: ACTIVITIES ARE NOT PREVENTED

## 2020-09-25 ASSESSMENT — PAIN DESCRIPTION - DESCRIPTORS
DESCRIPTORS: ACHING

## 2020-09-25 ASSESSMENT — PAIN DESCRIPTION - PAIN TYPE
TYPE: SURGICAL PAIN

## 2020-09-25 ASSESSMENT — PAIN DESCRIPTION - PROGRESSION
CLINICAL_PROGRESSION: NOT CHANGED

## 2020-09-25 NOTE — PROGRESS NOTES
Progress Note  Hospital Day: 10  POD#  3  S/P Coronary artery bypass x 3, LAD endarterectomy + LAAA clip (2020)    Chief Complaint: Postop follow up    Ms. Tracee Armendariz is resting in bed, feels well, passing flatus, ambulating during the day     24 hour Interval History:   Converted to NSR this morning  Chest tubes and sotomayor removed    VITAL SIGNS   Temperature:  Current - Temp: 98.2 °F (36.8 °C); Max - Temp  Av.1 °F (36.7 °C)  Min: 97.9 °F (36.6 °C)  Max: 98.3 °F (36.8 °C)    Respiratory Rate : Resp  Av.7  Min: 13  Max: 27    Pulse Range: Pulse  Av  Min: 73  Max: 109    Blood Pressure Range:  Systolic (89LRT), AZL:43 , Min:86 , RUL:772   ; Diastolic (15DZT), QGE:44, Min:59, Max:89    Pulse ox Range: SpO2  Av.9 %  Min: 88 %  Max: 100 %  O2 Flow Rate (L/min): 3 L/min    Admission Weight: Weight: 159 lb (72.1 kg)    Current Weight: up 17 lbs from preop  Patient Vitals for the past 96 hrs (Last 3 readings):   Weight   20 0516 174 lb 9.7 oz (79.2 kg)   20 0600 177 lb 4 oz (80.4 kg)   20 0700 176 lb 12.9 oz (80.2 kg)     I/O:     Intake/Output Summary (Last 24 hours) at 2020 0648  Last data filed at 2020 0515  Gross per 24 hour   Intake 1170 ml   Output 2490 ml   Net -1320 ml     Urine (sotomayor): 900-900-400 ml/shift    LINES/ACCESS:   Central Access: RIJ Day #: 1   Peripheral Access: Rt AC Day #: 7                                       Diet: DIET CARDIAC; Carb Control: 3 carb choices (45 gms)/meal; No Added Salt (3-4 GM);  Daily Fluid Restriction: 1500 ml    MEDICATIONS:      amiodarone  400 mg Oral BID    Followed by   Rena Geronimo ON 2020] amiodarone  200 mg Oral Daily    furosemide  60 mg Intravenous BID    sodium chloride flush  10 mL Intravenous 2 times per day    pantoprazole  40 mg Oral Daily    metoprolol tartrate  12.5 mg Oral BID    chlorhexidine  15 mL Mouth/Throat BID    magnesium oxide  400 mg Oral BID    mupirocin   Nasal BID    potassium chloride  10 mEq Oral TID     aspirin  325 mg Oral Daily    clopidogrel  75 mg Oral Daily    insulin lispro  0-12 Units Subcutaneous Q4H    polyethylene glycol  17 g Oral Daily    ketorolac  15 mg Intravenous Q6H    [Held by provider] amLODIPine  5 mg Oral Daily    atorvastatin  80 mg Oral Nightly       Infusions:   DOPamine      norepinephrine      dextrose       DATA REVIEW:   CBC:   Recent Labs     09/22/20  1325 09/22/20  1419 09/23/20  0508 09/24/20  0400 09/25/20  0506   WBC  --  12.9* 9.8 12.1* 11.0   HGB 7.4* 10.5* 9.9* 9.4* 9.1*   HCT  --  31.0* 29.8* 28.8* 27.4*   MCV  --  83.5 85.9 86.1 84.1   PLT  --  146 170 160 193     BMP:   Recent Labs     09/22/20  1419 09/23/20  0508 09/24/20  0400 09/25/20  0506    138 132* 136   K 4.5 4.8 4.8 5.1    108 99 100   CO2 22 22 25 27   GLUCOSE 105* 144* 164* 129*   BUN 15 16 22* 27*   CREATININE 0.5* 0.5* 0.7 0.7   CALCIUM 8.3 8.4 8.2* 8.3   MG 3.00* 2.30 2.10 2.30     Accucheck Glucoses:   Recent Labs     09/24/20  1455 09/24/20  1815 09/24/20  2155 09/25/20  0149 09/25/20  0515   POCGLU 120* 126* 152* 153* 97     PT/INR:   Recent Labs     09/22/20  1419 09/23/20  0508   PROTIME 12.3 13.7*   INR 1.06 1.18*     APTT:   Recent Labs     09/22/20  1419   APTT 25.0     ABG:    Lab Results   Component Value Date    PHART 7.287 09/22/2020    PO2ART 113.0 09/22/2020    YGR6XJF 49.0 09/22/2020    R4OYFJUG 98 09/22/2020    PEN2XYU 25 09/22/2020    BCJ7HHM 23.4 09/22/2020    BEART -3 09/22/2020    BEART -4 09/22/2020    BEART -4 09/22/2020    BEART -7 09/22/2020    BEART -2 09/22/2020       ASSESSMENT:   Patient Active Problem List:     Uncontrolled hypertension     Chronic eczematous otitis externa of both ears     Cardiac murmur     Chronic pain syndrome     Chronic neck pain     Chest pain     NSTEMI (non-ST elevated myocardial infarction) ([facility] [address]     Coronary artery disease due to lipid rich plaque     Type 2 diabetes mellitus without complication ([facility] [address]     Hyperlipidemia      Neuro: awake, alert and oriented x 3  CV:   NSR rate 70  Pulm:  Diminished in bases, on 3L NC  Abd:  soft, non-tender; passing flatus  Robles: clear yellow  Wounds: clean, dry and intact  Ext: warm, no edema, FOZIA hose    PLAN:   · Neuro: pain controlled  ·  Percocet  · CV - on Beta blocker, norvasc, statin, ASA/Plavix  · Converted to NSR this morning, Discontinue Amio infusion   · PO Amio for Afib  · Hold norvasc  · Pulm - wean O2 as tolerated, continue incentive spirometry and resp tx, add Acapella  · 3L O2 at night, off during the day, wean as able  · Renal - creatinine stable 0.7, 2.2L UOP  · Weight 174 lb, pre op 159 lbs  · diuresis for postop fluid retention, lasix 60 BID IV, continue, will transition to PO at discharge  · Robles discontinued, voiding spontaneously  · Heme - Acute blood loss anemia as expected- hgb 9.1, continue to monitor        · ID - continue perioperative antibiotics  · FEN - cardiac diet with 1500 ml fluid restriction     - SSI  · VTE prophylaxis - SCD and FOZIA hose  · Disposition -   Transition to progressive care, likely discharge to home later today or tomorrow  Patient seen with Dr Roselia Cam who is agreeable to assessment and plan. Bisi Person M.D.   Cardiothoracic Surgery Fellow  Cell: [phone]

## 2020-09-25 NOTE — PROGRESS NOTES
[facility] Daily Progress Note      Admit Date:  9/16/2020    Subjective:  Ms. Sayra Townsend was seen and examined. F/U Non st/CAD/CABG and now afib.  afib overnight. She is feeling better. Pain control reasonable.      Objective:   /79   Pulse 82   Temp 98.2 °F (36.8 °C) (Oral)   Resp 25   Ht 5' 7\" (1.702 m)   Wt 174 lb 9.7 oz (79.2 kg)   SpO2 90%   BMI 27.35 kg/m²       Intake/Output Summary (Last 24 hours) at 9/25/2020 1320  Last data filed at 9/25/2020 1200  Gross per 24 hour   Intake 920 ml   Output 2650 ml   Net -1730 ml       TELEMETRY: Atrial fibrillation     Physical Exam:  General:  Awake, alert, NAD  Skin:  Warm and dry  Neck:  JVP difficult  Chest:  Decreased BS, respiration normal  Cardiovascular:  Irreg/irreg S1S2  Abdomen:  Soft nontender  Extremities:  tr edema    Medications:    potassium chloride  10 mEq Oral BID    insulin lispro  0-12 Units Subcutaneous 4x Daily AC & HS    amiodarone  400 mg Oral BID    Followed by   Promise Brown ON 9/29/2020] amiodarone  200 mg Oral Daily    furosemide  60 mg Intravenous BID    sodium chloride flush  10 mL Intravenous 2 times per day    pantoprazole  40 mg Oral Daily    metoprolol tartrate  12.5 mg Oral BID    chlorhexidine  15 mL Mouth/Throat BID    magnesium oxide  400 mg Oral BID    mupirocin   Nasal BID    aspirin  325 mg Oral Daily    clopidogrel  75 mg Oral Daily    polyethylene glycol  17 g Oral Daily    [Held by provider] amLODIPine  5 mg Oral Daily    atorvastatin  80 mg Oral Nightly      DOPamine      dextrose       sodium chloride flush, magnesium sulfate, acetaminophen, oxyCODONE-acetaminophen **OR** oxyCODONE-acetaminophen, fentanNYL, diphenhydrAMINE, ondansetron, metoclopramide, hydrALAZINE, metoprolol, DOPamine, glucose, dextrose, glucagon (rDNA), dextrose, albuterol, polyethylene glycol, promethazine **OR** [DISCONTINUED] ondansetron    Lab Data:  CBC:   Recent Labs     09/23/20  0508 09/24/20  0400 09/25/20  0506   WBC 9.8 12.1* 11.0   HGB 9.9* 9.4* 9.1*   HCT 29.8* 28.8* 27.4*   MCV 85.9 86.1 84.1    160 193     BMP:   Recent Labs     09/23/20  0508 09/24/20  0400 09/25/20  0506    132* 136   K 4.8 4.8 5.1    99 100   CO2 22 25 27   BUN 16 22* 27*   CREATININE 0.5* 0.7 0.7     LIVER PROFILE: No results for input(s): AST, ALT, LIPASE, BILIDIR, BILITOT, ALKPHOS in the last 72 hours. Invalid input(s): AMYLASE,  ALB  PT/INR:   Recent Labs     09/22/20  1419 09/23/20  0508   PROTIME 12.3 13.7*   INR 1.06 1.18*     APTT:   Recent Labs     09/22/20  1419   APTT 25.0     BNP:  No results for input(s): BNP in the last 72 hours. IMAGING:     Assessment:  Patient Active Problem List    Diagnosis Date Noted    Hyperlipidemia     Coronary artery disease due to lipid rich plaque     Type 2 diabetes mellitus without complication ([facility] [address]     NSTEMI (non-ST elevated myocardial infarction) ([facility] [address] 09/16/2020    Chest pain 02/27/2020    Uncontrolled hypertension 08/29/2018    Chronic eczematous otitis externa of both ears 08/29/2018    Cardiac murmur 08/29/2018    Chronic pain syndrome 08/29/2018    Chronic neck pain 08/29/2018       Plan:  1. Continues to feel better. Pain reasonable. Breathing better. Goo diuresis. Converted back to NSR, On amio. Increase activities. Likely home soon. Will follow from a distance.        Core Measures:  · Discharge instructions:   · LVEF documented:   · ACEI for LV dysfunction:   · Smoking Cessation:    Natali Verdugo MD 9/25/2020 1:20 PM

## 2020-09-25 NOTE — PROGRESS NOTES
Walked patient at 0500 on 2L NC with front wheel walker. Patient tolerated fairly well, walking around nurses station and back to room. Pain tolerable, but some SOB requiring a temporary increase in oxygen. Gait very steady. Patient returned to bed and PRN pain medication given per order.  Will continue to monitor

## 2020-09-25 NOTE — PLAN OF CARE
Problem: Pain:  Description: Pain management should include both nonpharmacologic and pharmacologic interventions.   Goal: Pain level will decrease  Description: Pain level will decrease  Outcome: Met This Shift  Goal: Control of acute pain  Description: Control of acute pain  Outcome: Met This Shift     Problem: HEMODYNAMIC STATUS  Goal: Patient has stable vital signs and fluid balance  Outcome: Met This Shift     Problem: FLUID AND ELECTROLYTE IMBALANCE  Goal: Fluid and electrolyte balance are achieved/maintained  Outcome: Met This Shift     Problem: Cardiac Output - Decreased:  Goal: Hemodynamic stability will improve  Description: Hemodynamic stability will improve  Outcome: Met This Shift     Problem: Bleeding:  Goal: Will show no signs and symptoms of excessive bleeding  Description: Will show no signs and symptoms of excessive bleeding  Outcome: Met This Shift     Problem: Skin Integrity:  Goal: Will show no infection signs and symptoms  Description: Will show no infection signs and symptoms  Outcome: Met This Shift  Goal: Absence of new skin breakdown  Description: Absence of new skin breakdown  Outcome: Met This Shift

## 2020-09-25 NOTE — CARE COORDINATION
I spoke with patient and boyfriend today and home care orders noted. I discussed this with the patient and she is agreeable to home care. Confirmed address and phone numbers. Patient has no preference in agency and I have asked Benjamin Sandoval with [facility] today to see if they are in network. Patient expects to d/c to home tomorrow. Also patient will need a rolling walker. Referral made with Faisal from [facility] to deliver to the patient. Electronically signed by Maldonado Schwarz RN on 9/25/2020 at 11:03 AM  [phone]    I called Marva at [place] [address] and they are in network and able to provide care for this patient. They are able to pull orders from [facility]. [facility] is not in network for DME and I gave the information to both patient and boyfriend for Evi Albarran for a rolling walker but they decided to go ahead and purchase one here through [address] [place] [address]. I called Faisal to have him deliver this to the patient in the room. Patient is hoping to d/c to home tomorrow.      Electronically signed by Maldonado Schwarz RN on 9/25/2020 at 12:04 PM  [phone]

## 2020-09-25 NOTE — DISCHARGE INSTR - DIET

## 2020-09-25 NOTE — PROGRESS NOTES
0140 patient returned to NSR with HR in 80s. Remains on amio gtt at .5mg/min.  Will continue to monitor

## 2020-09-25 NOTE — PLAN OF CARE
Problem: Pain:  Goal: Pain level will decrease  Description: Pain level will decrease  Outcome: Ongoing  Goal: Control of acute pain  Description: Control of acute pain  Outcome: Ongoing  Goal: Control of chronic pain  Description: Control of chronic pain  Outcome: Ongoing     Problem: HEMODYNAMIC STATUS  Goal: Patient has stable vital signs and fluid balance  Outcome: Ongoing     Problem: FLUID AND ELECTROLYTE IMBALANCE  Goal: Fluid and electrolyte balance are achieved/maintained  Outcome: Ongoing     Problem: OXYGENATION/RESPIRATORY FUNCTION  Goal: Patient will achieve/maintain normal respiratory rate/effort  Description: Respiratory rate and effort will be within normal limits for the patient  Outcome: Ongoing     Problem: PSYCHOSOCIAL NEEDS  Goal: Demonstrates ability to cope with illness  Outcome: Ongoing     Problem: Cardiac Output - Decreased:  Goal: Hemodynamic stability will improve  Description: Hemodynamic stability will improve  Outcome: Ongoing     Problem: Bleeding:  Goal: Will show no signs and symptoms of excessive bleeding  Description: Will show no signs and symptoms of excessive bleeding  Outcome: Ongoing     Problem: Discharge Planning:  Goal: Discharged to appropriate level of care  Description: Discharged to appropriate level of care  Outcome: Ongoing     Problem:  Activity Intolerance:  Goal: Able to perform prescribed physical activity  Description: Able to perform prescribed physical activity  Outcome: Ongoing  Goal: Ability to tolerate increased activity will improve  Description: Ability to tolerate increased activity will improve  Outcome: Ongoing     Problem: Anxiety:  Goal: Level of anxiety will decrease  Description: Level of anxiety will decrease  Outcome: Ongoing     Problem: Fluid Volume - Imbalance:  Goal: Ability to achieve a balanced intake and output will improve  Description: Ability to achieve a balanced intake and output will improve  Outcome: Ongoing  Goal: Chest tube

## 2020-09-25 NOTE — PROGRESS NOTES
[facility] called and updated. They would like to be contacted if a brain death exam occurs or withdrawal of care by family.

## 2020-09-26 VITALS
OXYGEN SATURATION: 98 % | SYSTOLIC BLOOD PRESSURE: 133 MMHG | HEART RATE: 86 BPM | RESPIRATION RATE: 17 BRPM | TEMPERATURE: 98 F | DIASTOLIC BLOOD PRESSURE: 102 MMHG | HEIGHT: 67 IN | BODY MASS INDEX: 26.89 KG/M2 | WEIGHT: 171.3 LBS

## 2020-09-26 LAB
ANION GAP SERPL CALCULATED.3IONS-SCNC: 7 MMOL/L (ref 3–16)
BUN BLDV-MCNC: 27 MG/DL (ref 7–20)
CALCIUM SERPL-MCNC: 8.7 MG/DL (ref 8.3–10.6)
CHLORIDE BLD-SCNC: 99 MMOL/L (ref 99–110)
CO2: 29 MMOL/L (ref 21–32)
CREAT SERPL-MCNC: 0.7 MG/DL (ref 0.6–1.2)
GFR AFRICAN AMERICAN: >60
GFR NON-AFRICAN AMERICAN: >60
GLUCOSE BLD-MCNC: 101 MG/DL (ref 70–99)
GLUCOSE BLD-MCNC: 114 MG/DL (ref 70–99)
HCT VFR BLD CALC: 27.8 % (ref 36–48)
HEMOGLOBIN: 9.2 G/DL (ref 12–16)
MAGNESIUM: 2.5 MG/DL (ref 1.8–2.4)
MCH RBC QN AUTO: 28 PG (ref 26–34)
MCHC RBC AUTO-ENTMCNC: 33 G/DL (ref 31–36)
MCV RBC AUTO: 84.7 FL (ref 80–100)
PDW BLD-RTO: 13.7 % (ref 12.4–15.4)
PERFORMED ON: ABNORMAL
PLATELET # BLD: 244 K/UL (ref 135–450)
PMV BLD AUTO: 8.7 FL (ref 5–10.5)
POTASSIUM SERPL-SCNC: 4.6 MMOL/L (ref 3.5–5.1)
RBC # BLD: 3.28 M/UL (ref 4–5.2)
SODIUM BLD-SCNC: 135 MMOL/L (ref 136–145)
WBC # BLD: 9.5 K/UL (ref 4–11)

## 2020-09-26 PROCEDURE — 85027 COMPLETE CBC AUTOMATED: CPT

## 2020-09-26 PROCEDURE — 36415 COLL VENOUS BLD VENIPUNCTURE: CPT

## 2020-09-26 PROCEDURE — 99232 SBSQ HOSP IP/OBS MODERATE 35: CPT | Performed by: INTERNAL MEDICINE

## 2020-09-26 PROCEDURE — 83735 ASSAY OF MAGNESIUM: CPT

## 2020-09-26 PROCEDURE — 94761 N-INVAS EAR/PLS OXIMETRY MLT: CPT

## 2020-09-26 PROCEDURE — 6370000000 HC RX 637 (ALT 250 FOR IP): Performed by: STUDENT IN AN ORGANIZED HEALTH CARE EDUCATION/TRAINING PROGRAM

## 2020-09-26 PROCEDURE — 80048 BASIC METABOLIC PNL TOTAL CA: CPT

## 2020-09-26 PROCEDURE — 94150 VITAL CAPACITY TEST: CPT

## 2020-09-26 PROCEDURE — 99024 POSTOP FOLLOW-UP VISIT: CPT | Performed by: THORACIC SURGERY (CARDIOTHORACIC VASCULAR SURGERY)

## 2020-09-26 PROCEDURE — 94669 MECHANICAL CHEST WALL OSCILL: CPT

## 2020-09-26 PROCEDURE — 2700000000 HC OXYGEN THERAPY PER DAY

## 2020-09-26 PROCEDURE — 2580000003 HC RX 258: Performed by: THORACIC SURGERY (CARDIOTHORACIC VASCULAR SURGERY)

## 2020-09-26 PROCEDURE — 6360000002 HC RX W HCPCS: Performed by: STUDENT IN AN ORGANIZED HEALTH CARE EDUCATION/TRAINING PROGRAM

## 2020-09-26 PROCEDURE — 6370000000 HC RX 637 (ALT 250 FOR IP): Performed by: THORACIC SURGERY (CARDIOTHORACIC VASCULAR SURGERY)

## 2020-09-26 RX ORDER — ATORVASTATIN CALCIUM 80 MG/1
80 TABLET, FILM COATED ORAL NIGHTLY
Qty: 30 TABLET | Refills: 3 | Status: SHIPPED | OUTPATIENT
Start: 2020-09-26 | End: 2021-01-22 | Stop reason: SDUPTHER

## 2020-09-26 RX ORDER — FUROSEMIDE 20 MG/1
20 TABLET ORAL DAILY
Qty: 60 TABLET | Refills: 3 | Status: SHIPPED | OUTPATIENT
Start: 2020-09-26 | End: 2021-04-15 | Stop reason: SDUPTHER

## 2020-09-26 RX ORDER — NICOTINE POLACRILEX 4 MG
15 LOZENGE BUCCAL PRN
Qty: 45 G | Refills: 1 | Status: SHIPPED | OUTPATIENT
Start: 2020-09-26 | End: 2021-10-08 | Stop reason: ALTCHOICE

## 2020-09-26 RX ORDER — CLOPIDOGREL BISULFATE 75 MG/1
75 TABLET ORAL DAILY
Qty: 30 TABLET | Refills: 3 | Status: SHIPPED | OUTPATIENT
Start: 2020-09-26 | End: 2021-01-22 | Stop reason: SDUPTHER

## 2020-09-26 RX ORDER — OXYCODONE HYDROCHLORIDE AND ACETAMINOPHEN 5; 325 MG/1; MG/1
1 TABLET ORAL EVERY 4 HOURS PRN
Qty: 28 TABLET | Refills: 0 | Status: SHIPPED | OUTPATIENT
Start: 2020-09-26 | End: 2020-09-29

## 2020-09-26 RX ORDER — POTASSIUM CHLORIDE 750 MG/1
10 TABLET, EXTENDED RELEASE ORAL 2 TIMES DAILY
Qty: 60 TABLET | Refills: 3 | Status: SHIPPED | OUTPATIENT
Start: 2020-09-26 | End: 2021-01-22 | Stop reason: SDUPTHER

## 2020-09-26 RX ORDER — AMIODARONE HYDROCHLORIDE 400 MG/1
400 TABLET ORAL 2 TIMES DAILY
Qty: 30 TABLET | Refills: 3 | Status: SHIPPED
Start: 2020-09-26 | End: 2020-09-28 | Stop reason: DRUGHIGH

## 2020-09-26 RX ORDER — POLYETHYLENE GLYCOL 3350 17 G/17G
17 POWDER, FOR SOLUTION ORAL DAILY PRN
Qty: 527 G | Refills: 1 | Status: SHIPPED | OUTPATIENT
Start: 2020-09-26 | End: 2020-10-26

## 2020-09-26 RX ORDER — PANTOPRAZOLE SODIUM 40 MG/1
40 TABLET, DELAYED RELEASE ORAL DAILY
Qty: 30 TABLET | Refills: 3 | Status: SHIPPED | OUTPATIENT
Start: 2020-09-26 | End: 2020-11-18

## 2020-09-26 RX ADMIN — ASPIRIN 325 MG: 325 TABLET, COATED ORAL at 08:38

## 2020-09-26 RX ADMIN — PANTOPRAZOLE SODIUM 40 MG: 40 TABLET, DELAYED RELEASE ORAL at 08:38

## 2020-09-26 RX ADMIN — AMIODARONE HYDROCHLORIDE 400 MG: 200 TABLET ORAL at 08:38

## 2020-09-26 RX ADMIN — CHLORHEXIDINE GLUCONATE 15 ML: 1.2 RINSE ORAL at 08:38

## 2020-09-26 RX ADMIN — OXYCODONE HYDROCHLORIDE AND ACETAMINOPHEN 1 TABLET: 5; 325 TABLET ORAL at 08:39

## 2020-09-26 RX ADMIN — FUROSEMIDE 60 MG: 10 INJECTION, SOLUTION INTRAMUSCULAR; INTRAVENOUS at 08:36

## 2020-09-26 RX ADMIN — MUPIROCIN: 20 OINTMENT TOPICAL at 08:39

## 2020-09-26 RX ADMIN — POTASSIUM CHLORIDE 10 MEQ: 750 TABLET, EXTENDED RELEASE ORAL at 08:38

## 2020-09-26 RX ADMIN — METOPROLOL TARTRATE 12.5 MG: 25 TABLET, FILM COATED ORAL at 08:38

## 2020-09-26 RX ADMIN — Medication 400 MG: at 08:38

## 2020-09-26 RX ADMIN — Medication 10 ML: at 08:39

## 2020-09-26 RX ADMIN — POLYETHYLENE GLYCOL (3350) 17 G: 17 POWDER, FOR SOLUTION ORAL at 08:36

## 2020-09-26 RX ADMIN — OXYCODONE HYDROCHLORIDE AND ACETAMINOPHEN 2 TABLET: 5; 325 TABLET ORAL at 03:15

## 2020-09-26 RX ADMIN — CLOPIDOGREL BISULFATE 75 MG: 75 TABLET ORAL at 08:38

## 2020-09-26 ASSESSMENT — PAIN SCALES - GENERAL
PAINLEVEL_OUTOF10: 7
PAINLEVEL_OUTOF10: 0
PAINLEVEL_OUTOF10: 4
PAINLEVEL_OUTOF10: 0

## 2020-09-26 ASSESSMENT — PAIN - FUNCTIONAL ASSESSMENT
PAIN_FUNCTIONAL_ASSESSMENT: PREVENTS OR INTERFERES WITH ALL ACTIVE AND SOME PASSIVE ACTIVITIES
PAIN_FUNCTIONAL_ASSESSMENT: PREVENTS OR INTERFERES WITH MANY ACTIVE NOT PASSIVE ACTIVITIES

## 2020-09-26 ASSESSMENT — PAIN DESCRIPTION - DESCRIPTORS
DESCRIPTORS: CONSTANT;ACHING;DISCOMFORT
DESCRIPTORS: DISCOMFORT

## 2020-09-26 ASSESSMENT — PAIN DESCRIPTION - LOCATION
LOCATION: INCISION
LOCATION: INCISION

## 2020-09-26 ASSESSMENT — PAIN DESCRIPTION - ONSET
ONSET: ON-GOING
ONSET: ON-GOING

## 2020-09-26 ASSESSMENT — PAIN DESCRIPTION - DIRECTION: RADIATING_TOWARDS: CHEST

## 2020-09-26 ASSESSMENT — PAIN DESCRIPTION - FREQUENCY
FREQUENCY: CONTINUOUS
FREQUENCY: CONTINUOUS

## 2020-09-26 ASSESSMENT — PAIN DESCRIPTION - PAIN TYPE
TYPE: SURGICAL PAIN
TYPE: SURGICAL PAIN

## 2020-09-26 ASSESSMENT — PAIN DESCRIPTION - ORIENTATION
ORIENTATION: MID
ORIENTATION: ANTERIOR;MID

## 2020-09-26 ASSESSMENT — PAIN DESCRIPTION - PROGRESSION
CLINICAL_PROGRESSION: GRADUALLY WORSENING
CLINICAL_PROGRESSION: GRADUALLY IMPROVING

## 2020-09-26 NOTE — PROGRESS NOTES
[facility] Daily Progress Note      Admit Date:  9/16/2020    Subjective:  Ms. Roxie Bustamante was seen and examined. F/U Non st/CAD/CABG and now afib.  afib overnight. She is feeling better. Pain control reasonable.      Objective:   /69   Pulse 73   Temp 97.9 °F (36.6 °C) (Oral)   Resp 14   Ht 5' 7\" (1.702 m)   Wt 174 lb 9.7 oz (79.2 kg)   SpO2 95%   BMI 27.35 kg/m²       Intake/Output Summary (Last 24 hours) at 9/26/2020 4483  Last data filed at 9/26/2020 0400  Gross per 24 hour   Intake 720 ml   Output 2450 ml   Net -1730 ml       TELEMETRY: Atrial fibrillation     Physical Exam:  General:  Awake, alert, NAD  Skin:  Warm and dry  Neck:  JVP difficult  Chest:  Decreased BS, respiration normal  Cardiovascular:  Irreg/irreg S1S2  Abdomen:  Soft nontender  Extremities:  tr edema    Medications:    potassium chloride  10 mEq Oral BID    insulin lispro  0-12 Units Subcutaneous 4x Daily AC & HS    amiodarone  400 mg Oral BID    Followed by   Traci Collins ON 9/29/2020] amiodarone  200 mg Oral Daily    furosemide  60 mg Intravenous BID    sodium chloride flush  10 mL Intravenous 2 times per day    pantoprazole  40 mg Oral Daily    metoprolol tartrate  12.5 mg Oral BID    chlorhexidine  15 mL Mouth/Throat BID    magnesium oxide  400 mg Oral BID    mupirocin   Nasal BID    aspirin  325 mg Oral Daily    clopidogrel  75 mg Oral Daily    polyethylene glycol  17 g Oral Daily    [Held by provider] amLODIPine  5 mg Oral Daily    atorvastatin  80 mg Oral Nightly      DOPamine      dextrose       amiodarone bolus, sodium chloride flush, magnesium sulfate, acetaminophen, oxyCODONE-acetaminophen **OR** oxyCODONE-acetaminophen, fentanNYL, diphenhydrAMINE, ondansetron, metoclopramide, hydrALAZINE, metoprolol, DOPamine, glucose, dextrose, glucagon (rDNA), dextrose, albuterol, polyethylene glycol, promethazine **OR** [DISCONTINUED] ondansetron    Lab Data:  CBC:   Recent Labs     09/24/20  0400

## 2020-09-26 NOTE — PROGRESS NOTES
Discharge order received. MACK completed. AVS covered with pt and signed by pt; copy in chart. Pt has one new prescription for oxycodone; paper prescription with pt at discharge. Pt belongings accounted for by pt and with pt. Pt transported to private vehicle. Best of luck to pt.

## 2020-09-26 NOTE — PROGRESS NOTES
Evening assessment completed. VSS, afebrile. Pt ambulated around nurses station utilizing go walker, returned to bed, tolerated well. Pt heart rhythm switched back to A Fib with HR 90's-110's. Nightly dose of PO amiodarone and metoprolol given per order. Dr. Brinton Cowden notified of rhythm change and current HR. Will continue to monitor HR and rhythm closely. Pt states pain is well controlled. Pt in bed, resting comfortably with call light in reach. Will continue to monitor closely and reassess.

## 2020-09-26 NOTE — PROGRESS NOTES
Progress Note  Hospital Day: 11  POD#  4  S/P Coronary artery bypass x 3, LAD endarterectomy + LAAA clip (2020)    Chief Complaint: Postop follow up  No major complaint or events sinus rhythm      VITAL SIGNS   Temperature:  Current - Temp: 97.9 °F (36.6 °C); Max - Temp  Av °F (36.7 °C)  Min: 97.9 °F (36.6 °C)  Max: 98.2 °F (36.8 °C)    Respiratory Rate : Resp  Av.2  Min: 11  Max: 27    Pulse Range: Pulse  Av  Min: 69  Max: 90    Blood Pressure Range:  Systolic (64ALZ), HXB:202 , Min:82 , QHB:360   ; Diastolic (31QZC), CBP:21, Min:59, Max:82    Pulse ox Range: SpO2  Av %  Min: 88 %  Max: 97 %  O2 Flow Rate (L/min): 3 L/min    CVP (Mean): 21 mmHg  PAP: 38/21  PAP (Mean): 28 mmHg  SVR (Using ABP Mean): 1130.43 dyne*sec/cm5  CCI: 2.5 L/min    Admission Weight: Weight: 159 lb (72.1 kg)    Current Weight: up 17 lbs from preop  Patient Vitals for the past 96 hrs (Last 3 readings):   Weight   20 0516 174 lb 9.7 oz (79.2 kg)   20 0600 177 lb 4 oz (80.4 kg)   20 0700 176 lb 12.9 oz (80.2 kg)     I/O:     Intake/Output Summary (Last 24 hours) at 2020 0719  Last data filed at 2020 0400  Gross per 24 hour   Intake 720 ml   Output 2450 ml   Net -1730 ml     Urine (sotomayor): 019-287-218 ml/shift  Chest tube: 100-  ml/shift  serosanguinous, on suction, no leak    LINES/ACCESS:   Central Access: RIJ Day #: 1   Peripheral Access: Rt AC Day #: 7                                   Chest tubes #2      Diet: DIET CARDIAC; Carb Control: 3 carb choices (45 gms)/meal; No Added Salt (3-4 GM);  Daily Fluid Restriction: 1500 ml    MEDICATIONS:      potassium chloride  10 mEq Oral BID    insulin lispro  0-12 Units Subcutaneous 4x Daily AC & HS    amiodarone  400 mg Oral BID    Followed by   Garcia Anand ON 2020] amiodarone  200 mg Oral Daily    furosemide  60 mg Intravenous BID    sodium chloride flush  10 mL Intravenous 2 times per day    pantoprazole  40 mg Oral Daily  metoprolol tartrate  12.5 mg Oral BID    chlorhexidine  15 mL Mouth/Throat BID    magnesium oxide  400 mg Oral BID    mupirocin   Nasal BID    aspirin  325 mg Oral Daily    clopidogrel  75 mg Oral Daily    polyethylene glycol  17 g Oral Daily    [Held by provider] amLODIPine  5 mg Oral Daily    atorvastatin  80 mg Oral Nightly       Infusions:   DOPamine      dextrose       DATA REVIEW:   CBC:   Recent Labs     09/24/20  0400 09/25/20  0506 09/26/20  0320   WBC 12.1* 11.0 9.5   HGB 9.4* 9.1* 9.2*   HCT 28.8* 27.4* 27.8*   MCV 86.1 84.1 84.7    193 244     BMP:   Recent Labs     09/24/20  0400 09/25/20  0506 09/26/20  0320   * 136 135*   K 4.8 5.1 4.6   CL 99 100 99   CO2 25 27 29   GLUCOSE 164* 129* 114*   BUN 22* 27* 27*   CREATININE 0.7 0.7 0.7   CALCIUM 8.2* 8.3 8.7   MG 2.10 2.30 2.50*     Accucheck Glucoses:   Recent Labs     09/25/20  0515 09/25/20  1129 09/25/20  1726 09/25/20  2029 09/26/20  0630   POCGLU 97 153* 105* 115* 101*     PT/INR:   No results for input(s): PROTIME, INR in the last 72 hours. APTT:   No results for input(s): APTT in the last 72 hours.   ABG:    Lab Results   Component Value Date    PHART 7.287 09/22/2020    PO2ART 113.0 09/22/2020    PBV8XXZ 49.0 09/22/2020    O1FOJYZV 98 09/22/2020    MIO7ALQ 25 09/22/2020    GJD9LSM 23.4 09/22/2020    BEART -3 09/22/2020    BEART -4 09/22/2020    BEART -4 09/22/2020    BEART -7 09/22/2020    BEART -2 09/22/2020       ASSESSMENT:   Patient Active Problem List:     Uncontrolled hypertension     Chronic eczematous otitis externa of both ears     Cardiac murmur     Chronic pain syndrome     Chronic neck pain     Chest pain     NSTEMI (non-ST elevated myocardial infarction) ([facility] [address]     Coronary artery disease due to lipid rich plaque     Type 2 diabetes mellitus without complication (HCC)     Hyperlipidemia      Neuro: awake, alert and oriented x 3  CV:   Afib rate 110  Pulm:  Diminished in bases, on 1L NC  Abd:  soft, non-tender; bowel sounds hypoactive  Robles: clear yellow  Wounds: clean, dry and intact  Ext: warm, + edema    PLAN:   DC home today

## 2020-09-26 NOTE — PROGRESS NOTES
RIJ CVC removed per discharge order. Suture removed prior to removal of line. CVC intact on removal. Sterile guaze and tegaderm applied to site. Will monitor.

## 2020-09-26 NOTE — PLAN OF CARE
Problem: Pain:  Goal: Control of acute pain  Description: Control of acute pain  9/26/2020 0821 by Faviola Hernandez RN  Outcome: Ongoing  Note: Pt with c/o pain this morning r/t surgical incisions. PRN pain medication to be administered with breakfast and pt was repositioned for comfort. Will monitor. Problem: Discharge Planning:  Goal: Discharged to appropriate level of care  Description: Discharged to appropriate level of care  9/26/2020 0821 by Faviola Hernandez RN  Outcome: Ongoing  Note: Pt ready to be discharged home with home healthcare. Will monitor. Problem: Skin Integrity:  Goal: Absence of new skin breakdown  Description: Absence of new skin breakdown  9/26/2020 0821 by Favoila Hernandez RN  Outcome: Ongoing  Note: Pt at risk for skin breakdown. See Shailesh score. Pt remains on bedrest. Unable to reposition self in bed. Heels elevated off bed. Sacral heart mepilex intact to protect, site inspected and intact underneath. Will continue to turn and reposition patient every two hours and as needed. Will continue to keep patient clean and dry, applying skin care cream as needed. Pillows used for repositioning q2hs. Will continue to monitor and assess for skin breakdown.

## 2020-09-26 NOTE — PLAN OF CARE
Care plan reviewed.       Problem: Pain:  Goal: Pain level will decrease  Description: Pain level will decrease  9/25/2020 2206 by Omar Rowley RN  Outcome: Ongoing  9/25/2020 1846 by Dakota Ramires RN  Outcome: Met This Shift  Goal: Control of acute pain  Description: Control of acute pain  9/25/2020 1846 by Dakota Ramires RN  Outcome: Met This Shift     Problem: HEMODYNAMIC STATUS  Goal: Patient has stable vital signs and fluid balance  9/25/2020 2206 by Omar Rowley RN  Outcome: Ongoing  9/25/2020 1846 by Dakota Ramires RN  Outcome: Met This Shift     Problem: FLUID AND ELECTROLYTE IMBALANCE  Goal: Fluid and electrolyte balance are achieved/maintained  9/25/2020 1846 by Dakota Ramires RN  Outcome: Met This Shift     Problem: OXYGENATION/RESPIRATORY FUNCTION  Goal: Patient will achieve/maintain normal respiratory rate/effort  Description: Respiratory rate and effort will be within normal limits for the patient  9/25/2020 2206 by Omar Rowley RN  Outcome: Ongoing  9/25/2020 1846 by Dakota Ramires RN  Outcome: Ongoing

## 2020-09-27 LAB
EKG ATRIAL RATE: 0 BPM
EKG ATRIAL RATE: 78 BPM
EKG DIAGNOSIS: NORMAL
EKG DIAGNOSIS: NORMAL
EKG Q-T INTERVAL: 280 MS
EKG Q-T INTERVAL: 384 MS
EKG QRS DURATION: 114 MS
EKG QRS DURATION: 42 MS
EKG QTC CALCULATION (BAZETT): 375 MS
EKG QTC CALCULATION (BAZETT): 492 MS
EKG R AXIS: -50 DEGREES
EKG R AXIS: -84 DEGREES
EKG T AXIS: 41 DEGREES
EKG T AXIS: 67 DEGREES
EKG VENTRICULAR RATE: 108 BPM
EKG VENTRICULAR RATE: 99 BPM

## 2020-09-27 PROCEDURE — 93010 ELECTROCARDIOGRAM REPORT: CPT | Performed by: INTERNAL MEDICINE

## 2020-09-28 ENCOUNTER — TELEPHONE (OUTPATIENT)
Dept: CARDIOTHORACIC SURGERY | Age: 60
End: 2020-09-28

## 2020-09-28 PROBLEM — Z98.890 S/P LEFT ATRIAL APPENDAGE LIGATION: Status: ACTIVE | Noted: 2020-09-01

## 2020-09-28 PROBLEM — Z95.1 S/P CABG X 3: Status: ACTIVE | Noted: 2020-09-01

## 2020-09-28 PROBLEM — I97.89 POSTOPERATIVE ATRIAL FIBRILLATION (HCC): Status: ACTIVE | Noted: 2020-09-01

## 2020-09-28 PROBLEM — I48.91 POSTOPERATIVE ATRIAL FIBRILLATION (HCC): Status: ACTIVE | Noted: 2020-09-01

## 2020-09-28 RX ORDER — AMIODARONE HYDROCHLORIDE 200 MG/1
200 TABLET ORAL DAILY
Qty: 30 TABLET | Refills: 0 | Status: SHIPPED | OUTPATIENT
Start: 2020-09-28 | End: 2020-11-18 | Stop reason: ALTCHOICE

## 2020-09-28 NOTE — TELEPHONE ENCOUNTER
Received call from patient reporting issues w/slt SOB when trying to lay flat or w/walking. She is S/P CABG x3 w/5.2 cm LAD endarderectomy, L SVG & NATALIE clip done 9/22/2020, discharged on Saturday, 9/26/2020. States she was having some dizziness/nausea she felt was due to furosemide. I let her know it was important for her to take this daily as she had about 11 lbs fluid retention from surgery this was meant to help get rid of. She will take it w/lunch from now on. Today's weight was 170 lbs, down just 1 lb. Has low grade temp of 99.2. BP stable at 122/62, HR 82 & regular. Does have some swelling in feet, L>R. Wearing TEDs. Advised to elevate legs when sitting. Emphasized importance of using IS hourly, has been achieving 750-1000 cc. Encouraged goal of 9125-3585 cc, 10 slow deep breaths w/each hourly session. Appetite fair, has had BM today. Doesn't have pain in daytime, but does wake her at night, using pain med at HS. Encouraged to try Tylenol PM or melatonin supplement. Med list reviewed, did not get sent home w/amiodarone 200 mg daily mentioned in discharge note from Dr. Lazara Haley, script called in to pharmacy designated in [address] [place] [address] Rd. Incisions healing w/no redness/swelling or drainage. Confirmed has first post op scheduled w/Dr. Ceballos on 10/7/2020.

## 2020-10-05 NOTE — DISCHARGE SUMMARY
DISCHARGE SUMMARY    Patient ID: Aleksandra Santacruz  MRN:  [id number]  YOB: 1960      Admission Date:  9/16/2020 12:06 AM  Discharge Date:  9/26/2020 12:11 PM     Principle Diagnosis:  NSTEMI    Secondary Diagnosis:  Active Problems:    NSTEMI (non-ST elevated myocardial infarction) ([facility] [address]    Coronary artery disease due to lipid rich plaque    Type 2 diabetes mellitus without complication (HCC)    Hyperlipidemia    Paroxysmal atrial fibrillation    Acute blood loss anemia    Acute postoperative pain      Procedure:    CABG x 3, LIMA to LAD w/ 5.2 cm endarterectomy, SVG to OM2, SVG to distal RCA; 40 mm Atriclip 2 to LA appendage; pectoral block per anesthesia; endoscopic SV harvest L thigh    History:  The patient is a 63 y.o.  female with a history of smoking who presented to the ED with acute chest pain and work up remarkable for NSTEMI. Left heart catheterization revealed multivessel coronary artery disease. Hospital Course: The patient underwent CABG x 3, LIMA to LAD w/ 5.2 cm LAD endarterectomy, and placement of 40 mm Atriclip 2 to LA appendage on September 22, 2020. Her operative course was uncomplicated and she transferred to ICU for ongoing care. She was extubated the day of surgery and transitioned to progressive care on the first postoperative day. Later that evening she converted into atrial fibrillation with a rapid ventricular rate. She was started on an Amiodarone protocol and converted back to normal sinus rhythm. The remainder of her postop course followed the usual clinical pathway. She was ambulating around the ICU. Her bowel and bladder were functioning normally. She was deemed ready for discharge on the fourth postoperative day.       Consults:  IP CONSULT TO HOSPITALIST  IP CONSULT TO CARDIOLOGY  IP CONSULT TO CARDIOTHORACIC SURGERY  IP CONSULT TO CARDIOTHORACIC SURGERY  IP CONSULT TO CARDIAC REHAB  IP CONSULT TO CASE MANAGEMENT  IP CONSULT TO SOCIAL WORK  IP CONSULT TO DIETITIAN  IP CONSULT TO HOME CARE NEEDS     Significant Diagnostic Studies:   Chest X-ray  EKG  Transthoracic Echocardiogram 9/17/2020  [facility] 9/17/2020  Carotid Duplex 9/17/2020  LE vein mapping 9/17/2020    Treatments: IV hydration, antibiotics: Ancef and vancomycin, cardiac meds: metoprolol and furosemide, anticoagulation: ASA and Plavix and therapies: PT and OT    LABS:  Lab Results   Component Value Date    WBC 9.5 09/26/2020    HGB 9.2 (L) 09/26/2020    HCT 27.8 (L) 09/26/2020    MCV 84.7 09/26/2020     09/26/2020     Lab Results   Component Value Date     09/26/2020    K 4.6 09/26/2020    K 3.8 09/16/2020    CL 99 09/26/2020    CO2 29 09/26/2020    BUN 27 09/26/2020    CREATININE 0.7 09/26/2020    GLUCOSE 114 09/26/2020    CALCIUM 8.7 09/26/2020        Condition at discharge: Stable     Disposition:  Home with home healthcare    Physical Exam on discharge day:   BP (!) 133/102   Pulse 86   Temp 98 °F (36.7 °C) (Oral)   Resp 17   Ht 5' 7\" (1.702 m)   Wt 171 lb 4.8 oz (77.7 kg)   SpO2 98%   BMI 26.83 kg/m²     Neuro: awake, alert and oriented x 3  CV:   NSR  Pulm:  Diminished in bases  Abd:  soft, non-tender; bowel sounds normal  Wounds: clean, dry and intact  Ext: warm    Discharge Medications:      Medication List      START taking these medications    aspirin 325 MG EC tablet  Take 1 tablet by mouth daily     atorvastatin 80 MG tablet  Commonly known as:  LIPITOR  Take 1 tablet by mouth nightly     clopidogrel 75 MG tablet  Commonly known as:  PLAVIX  Take 1 tablet by mouth daily     furosemide 20 MG tablet  Commonly known as:  Lasix  Take 1 tablet by mouth daily     glucose 40 % Gel  Commonly known as:  GLUTOSE  Take 37.5 mLs by mouth as needed (prn)     metoprolol tartrate 25 MG tablet  Commonly known as:  LOPRESSOR  Take 0.5 tablets by mouth 2 times daily     pantoprazole 40 MG tablet  Commonly known as:  PROTONIX  Take 1 tablet by mouth daily     polyethylene glycol 17 g packet  Commonly known as:  GLYCOLAX  Take 17 g by mouth daily as needed for Constipation     potassium chloride 10 MEQ extended release tablet  Commonly known as:  KLOR-CON M  Take 1 tablet by mouth 2 times daily        STOP taking these medications    amLODIPine 5 MG tablet  Commonly known as:  NORVASC     oxymetazoline 0.05 % nasal spray  Commonly known as:  AFRIN     therapeutic multivitamin-minerals tablet     traMADol 50 MG tablet  Commonly known as:  ULTRAM        ASK your doctor about these medications    oxyCODONE-acetaminophen 5-325 MG per tablet  Commonly known as:  PERCOCET  Take 1 tablet by mouth every 4 hours as needed for Pain for up to 3 days. Ask about: Should I take this medication? Where to Get Your Medications      These medications were sent to [address], [address] [id number] [facility] [address]    Phone:  [phone]   · aspirin 325 MG EC tablet  · atorvastatin 80 MG tablet  · clopidogrel 75 MG tablet  · furosemide 20 MG tablet  · glucose 40 % Gel  · metoprolol tartrate 25 MG tablet  · pantoprazole 40 MG tablet  · polyethylene glycol 17 g packet  · potassium chloride 10 MEQ extended release tablet     You can get these medications from any pharmacy    Bring a paper prescription for each of these medications  · oxyCODONE-acetaminophen 5-325 MG per tablet         Specific Cardiac Medications:  ACE/ARB/ARNI:  contraindicated  Aspirin:  increase  Beta Blocker:  start  Statin:  start    Allergies   Allergen Reactions    Celecoxib Hives and Swelling     Patient tolerates Naproxen  Hives throat closes up  Throat swelling      Lisinopril Other (See Comments)     Cough       Macrodantin [Nitrofurantoin Macrocrystal] Other (See Comments)     Flu like s/s       Discharge Instructions: Activity: No heavy lifting greater than 5 pounds for 6 weeks post-op. Do not use arms to get up from a seated position.  Instead rock in chair to give yourself momentum to sit up. No driving  Diet: cardiac diet  Wound Care: keep wound clean and dry and open to air. Use antibacterial soap and clean washcloth to cleanse incisional wounds daily. Follow up Visits:  Dr Sil Jeffers in 1-2 weeks  Dr Mayra Moore in 2-3 weeks       Referred to cardiac rehab for Phase II and will follow up as an outpatient.         ROYER Singleton  10/5/2020  9:37 AM

## 2020-10-07 ENCOUNTER — OFFICE VISIT (OUTPATIENT)
Dept: CARDIOTHORACIC SURGERY | Age: 60
End: 2020-10-07

## 2020-10-07 VITALS
HEIGHT: 67 IN | WEIGHT: 159.2 LBS | HEART RATE: 85 BPM | BODY MASS INDEX: 24.99 KG/M2 | OXYGEN SATURATION: 97 % | SYSTOLIC BLOOD PRESSURE: 118 MMHG | TEMPERATURE: 98.5 F | DIASTOLIC BLOOD PRESSURE: 68 MMHG

## 2020-10-07 PROCEDURE — 99024 POSTOP FOLLOW-UP VISIT: CPT | Performed by: THORACIC SURGERY (CARDIOTHORACIC VASCULAR SURGERY)

## 2020-10-07 ASSESSMENT — ENCOUNTER SYMPTOMS
CHEST TIGHTNESS: 0
EYES NEGATIVE: 1
SHORTNESS OF BREATH: 0
COUGH: 0
GASTROINTESTINAL NEGATIVE: 1

## 2020-10-07 NOTE — PROGRESS NOTES
Subjective:      Patient ID: Antonio Velez is a 63 y.o. female. HPI  Ms. Deepti Mcduffie is a 63year old woman s/p CABG x3 with LAD endarterectomy on 9/22/20 with Dr. Isaac Rodríguez. Her post operative course was complicated by atrial fibrillation and was otherwise unremarkable. She was discharged to home on POD4 where she has been recovering well. She is active throughout the day with cooking and light cleaning. PT/OT has signed off. She ambulates around the block at least once a day and she is taking 2 one-half tabs of Percocet throughout the day. Her weight is down to her baseline and she denies leg swelling, though she has some numbness in her feet. She has successfully quit smoking and is generally feeling well. She is looking to return to work as a . Review of Systems   Constitutional: Negative for appetite change, chills, fatigue and fever. HENT: Negative. Eyes: Negative. Respiratory: Negative for cough, chest tightness and shortness of breath. Cardiovascular: Negative for chest pain, palpitations and leg swelling. Gastrointestinal: Negative. Endocrine: Negative. Genitourinary: Negative. Musculoskeletal: Negative. Neurological: Positive for numbness. Negative for dizziness and weakness. Psychiatric/Behavioral: Negative. Objective:   Physical Exam  Constitutional:       Appearance: Normal appearance. She is normal weight. HENT:      Head: Normocephalic. Eyes:      Extraocular Movements: Extraocular movements intact. Conjunctiva/sclera: Conjunctivae normal.   Neck:      Musculoskeletal: Normal range of motion and neck supple. Cardiovascular:      Rate and Rhythm: Normal rate and regular rhythm. Heart sounds: No murmur. Comments: Incision c/d/i, prineo dressing remains in place, no erythema or drainage, sternum stable  Pulmonary:      Effort: Pulmonary effort is normal.      Breath sounds: Normal breath sounds.    Abdominal:      General: There

## 2020-10-20 ENCOUNTER — OFFICE VISIT (OUTPATIENT)
Dept: CARDIOLOGY CLINIC | Age: 60
End: 2020-10-20
Payer: COMMERCIAL

## 2020-10-20 VITALS
HEART RATE: 68 BPM | SYSTOLIC BLOOD PRESSURE: 118 MMHG | BODY MASS INDEX: 25.21 KG/M2 | DIASTOLIC BLOOD PRESSURE: 70 MMHG | TEMPERATURE: 98.7 F | WEIGHT: 161 LBS

## 2020-10-20 PROCEDURE — 1036F TOBACCO NON-USER: CPT | Performed by: INTERNAL MEDICINE

## 2020-10-20 PROCEDURE — 3017F COLORECTAL CA SCREEN DOC REV: CPT | Performed by: INTERNAL MEDICINE

## 2020-10-20 PROCEDURE — G8428 CUR MEDS NOT DOCUMENT: HCPCS | Performed by: INTERNAL MEDICINE

## 2020-10-20 PROCEDURE — G8484 FLU IMMUNIZE NO ADMIN: HCPCS | Performed by: INTERNAL MEDICINE

## 2020-10-20 PROCEDURE — G8419 CALC BMI OUT NRM PARAM NOF/U: HCPCS | Performed by: INTERNAL MEDICINE

## 2020-10-20 PROCEDURE — 99213 OFFICE O/P EST LOW 20 MIN: CPT | Performed by: INTERNAL MEDICINE

## 2020-10-20 PROCEDURE — 1111F DSCHRG MED/CURRENT MED MERGE: CPT | Performed by: INTERNAL MEDICINE

## 2020-10-20 ASSESSMENT — ENCOUNTER SYMPTOMS
CHEST TIGHTNESS: 0
SHORTNESS OF BREATH: 0
COUGH: 0
CHOKING: 0

## 2020-10-20 NOTE — PROGRESS NOTES
Subjective:      Patient ID: Antonio Velez is a 63 y.o. female. HPI Here for follow up CAD/CABG/MI/HTN/Lipids. Doing well. Walking. Now walking around block daily. No angina. Breathing good. Off smokes. No pnd. No orthopnea. Swelling resolved. Wt down. No tachy/syncope.      Past Medical History:   Diagnosis Date    Arthritis     Hypertension     Postoperative atrial fibrillation ([address] 2020    S/P CABG x 3 2020    S/P left atrial appendage ligation 2020     Past Surgical History:   Procedure Laterality Date    CARDIAC CATHETERIZATION  2020    Dr. Alex Biggs Right 2011   [address] GRAFT Left 2020    Dr. Sushma Walton -  x3 (LIMA-LAD w/ 5.2 cm endarterectomy of LAD, L SVG-OM2, SVG-distal RCA); 40mm Atriclip placed to NATALIE; pectoral block per anesthesia    TOTAL KNEE ARTHROPLASTY Right 2009     Social History     Socioeconomic History    Marital status: Single     Spouse name: Not on file    Number of children: Not on file    Years of education: Not on file    Highest education level: Not on file   Occupational History    Not on file   Social Needs    Financial resource strain: Not on file    Food insecurity     Worry: Not on file     Inability: Not on file    Transportation needs     Medical: Not on file     Non-medical: Not on file   Tobacco Use    Smoking status: Former Smoker     Packs/day: 0.50     Years: 20.00     Pack years: 10.00     Types: Cigarettes     Last attempt to quit: 2020     Years since quittin.1    Smokeless tobacco: Never Used   Substance and Sexual Activity    Alcohol use: No    Drug use: No    Sexual activity: Yes     Partners: Male   Lifestyle    Physical activity     Days per week: Not on file     Minutes per session: Not on file    Stress: Not on file   Relationships    Social connections     Talks on phone: Not on file     Gets together: Not on file     Attends Confucianist service: Not on file     Active member of club or organization: Not on file     Attends meetings of clubs or organizations: Not on file     Relationship status: Not on file    Intimate partner violence     Fear of current or ex partner: Not on file     Emotionally abused: Not on file     Physically abused: Not on file     Forced sexual activity: Not on file   Other Topics Concern    Not on file   Social History Narrative    Not on file     FH reviewed. Vitals:    10/20/20 0933   BP: 118/70   Pulse: 68   Temp: 98.7 °F (37.1 °C)     Wt 161    Review of Systems   Constitutional: Negative for activity change, appetite change and fatigue. Respiratory: Negative for cough, choking, chest tightness and shortness of breath. Cardiovascular: Negative for chest pain, palpitations and leg swelling. Denies PND or orthopnea. No tachycardia or syncope. Neurological: Negative for dizziness, syncope and light-headedness. Psychiatric/Behavioral: Negative for agitation, behavioral problems and confusion. All other systems reviewed and are negative. Objective:   Physical Exam  Constitutional:       General: She is not in acute distress. Appearance: Normal appearance. She is well-developed. HENT:      Head: Normocephalic and atraumatic. Right Ear: External ear normal.      Left Ear: External ear normal.   Neck:      Musculoskeletal: Normal range of motion. Vascular: No JVD. Cardiovascular:      Rate and Rhythm: Normal rate and regular rhythm. Heart sounds: Normal heart sounds. No murmur. No gallop. Pulmonary:      Effort: Pulmonary effort is normal. No respiratory distress. Breath sounds: Normal breath sounds. No wheezing or rales. Abdominal:      General: Bowel sounds are normal.      Palpations: Abdomen is soft. Tenderness: There is no abdominal tenderness. Musculoskeletal: Normal range of motion. Skin:     General: Skin is warm and dry. Neurological:      General: No focal deficit present. Mental Status: She is oriented to person, place, and time. Psychiatric:         Mood and Affect: Mood normal.         Behavior: Behavior normal.         Assessment:       Diagnosis Orders   1. CAD in native artery     2. Hx of CABG     3. MI, old     4. Essential hypertension     5. Hyperlipidemia, unspecified hyperlipidemia type             Plan:      Doing well. Increasing activities. Melyssa op afib. Continue amio for now. Rhythm appears stable. Consider stopping from next visit. Reviewed previous records and testing including hosp for CABG/MI. Cardiac rehab. Follow up 2 months.         Natali Verdugo MD

## 2020-11-06 ENCOUNTER — HOSPITAL ENCOUNTER (OUTPATIENT)
Dept: CARDIAC REHAB | Age: 60
Setting detail: THERAPIES SERIES
Discharge: HOME | End: 2020-11-06
Payer: COMMERCIAL

## 2020-11-06 PROCEDURE — 93798 PHYS/QHP OP CAR RHAB W/ECG: CPT

## 2020-11-16 ENCOUNTER — HOSPITAL ENCOUNTER (OUTPATIENT)
Dept: CARDIAC REHAB | Age: 60
Setting detail: THERAPIES SERIES
Discharge: HOME | End: 2020-11-16
Payer: COMMERCIAL

## 2020-11-16 PROCEDURE — 93798 PHYS/QHP OP CAR RHAB W/ECG: CPT

## 2020-11-18 ENCOUNTER — OFFICE VISIT (OUTPATIENT)
Dept: CARDIOTHORACIC SURGERY | Age: 60
End: 2020-11-18

## 2020-11-18 VITALS
HEIGHT: 67 IN | WEIGHT: 159.8 LBS | OXYGEN SATURATION: 98 % | TEMPERATURE: 97.9 F | DIASTOLIC BLOOD PRESSURE: 70 MMHG | SYSTOLIC BLOOD PRESSURE: 104 MMHG | HEART RATE: 68 BPM | BODY MASS INDEX: 25.08 KG/M2

## 2020-11-18 PROCEDURE — 99024 POSTOP FOLLOW-UP VISIT: CPT | Performed by: THORACIC SURGERY (CARDIOTHORACIC VASCULAR SURGERY)

## 2020-11-18 ASSESSMENT — ENCOUNTER SYMPTOMS
EYES NEGATIVE: 1
CHEST TIGHTNESS: 0
COUGH: 0
SHORTNESS OF BREATH: 0
GASTROINTESTINAL NEGATIVE: 1

## 2020-11-18 NOTE — PROGRESS NOTES
Subjective:      Patient ID: Dora Mata is a 63 y.o. female. HPI  Ms. Concha Walsh is a 63year old woman s/p CABG x3 with LAD endarterectomy on 9/22/20 with Dr. Trell Tovar. Her post operative course was complicated by atrial fibrillation and was otherwise unremarkable. She has completed her course of amiodarone and has followed up with Dr. Rob Bolivar. She is participating in cardiac rehab and is ambulating daily. She would like to return to work next month. Review of Systems   Constitutional: Negative for appetite change, chills, fatigue and fever. HENT: Negative. Eyes: Negative. Respiratory: Negative for cough, chest tightness and shortness of breath. Cardiovascular: Negative for chest pain, palpitations and leg swelling. Gastrointestinal: Negative. Endocrine: Negative. Genitourinary: Negative. Musculoskeletal: Negative. Neurological: Negative for dizziness, weakness and numbness. Hematological: Negative. Psychiatric/Behavioral: Negative. Objective:   Physical Exam  Constitutional:       Appearance: Normal appearance. She is normal weight. HENT:      Head: Normocephalic. Eyes:      Extraocular Movements: Extraocular movements intact. Conjunctiva/sclera: Conjunctivae normal.   Neck:      Musculoskeletal: Normal range of motion and neck supple. Cardiovascular:      Rate and Rhythm: Normal rate and regular rhythm. Heart sounds: No murmur. Comments: Sternum stable  Pulmonary:      Effort: Pulmonary effort is normal.      Breath sounds: Normal breath sounds. Abdominal:      General: There is no distension. Musculoskeletal: Normal range of motion. General: No swelling or tenderness. Skin:     General: Skin is warm and dry. Neurological:      General: No focal deficit present. Mental Status: She is alert and oriented to person, place, and time. Mental status is at baseline.    Psychiatric:         Mood and Affect: Mood normal. Behavior: Behavior normal.         Thought Content: Thought content normal.         Assessment/Plan:      Ms. Fabienne Cleveland is a pleasant 63year old woman who is recovering well from her CABGx3 with LAD endarterectomy. She is recovering well and is not requiring narcotics. She is eager to return to work in December and I am happy to provide documentation for her to return to work on December 13th with sternal precautions under 25 lbs. She is participating in cardiac rehab and will follow up with Dr. Octavia Hinds in January. No additional follow up required. I am happy to see Ms. Fabienne Cleveland in the future if she has any questions or concerns. Coy Ramirez MD    I saw and evaluated the patient. I agree with the findings/plan of care in the fellow's note.       Ann Chen MD  11/18/2020  11:09 AM

## 2020-11-20 ENCOUNTER — HOSPITAL ENCOUNTER (OUTPATIENT)
Dept: CARDIAC REHAB | Age: 60
Setting detail: THERAPIES SERIES
Discharge: HOME | End: 2020-11-20
Payer: COMMERCIAL

## 2020-11-20 PROCEDURE — 93798 PHYS/QHP OP CAR RHAB W/ECG: CPT

## 2020-11-23 ENCOUNTER — HOSPITAL ENCOUNTER (OUTPATIENT)
Dept: CARDIAC REHAB | Age: 60
Setting detail: THERAPIES SERIES
Discharge: HOME | End: 2020-11-23
Payer: COMMERCIAL

## 2020-11-23 PROCEDURE — 93798 PHYS/QHP OP CAR RHAB W/ECG: CPT

## 2020-11-30 ENCOUNTER — HOSPITAL ENCOUNTER (OUTPATIENT)
Dept: CARDIAC REHAB | Age: 60
Setting detail: THERAPIES SERIES
Discharge: HOME | End: 2020-11-30
Payer: COMMERCIAL

## 2020-11-30 PROCEDURE — 93798 PHYS/QHP OP CAR RHAB W/ECG: CPT

## 2020-12-02 ENCOUNTER — HOSPITAL ENCOUNTER (OUTPATIENT)
Dept: CARDIAC REHAB | Age: 60
Setting detail: THERAPIES SERIES
Discharge: HOME | End: 2020-12-02
Payer: COMMERCIAL

## 2020-12-02 PROCEDURE — 93798 PHYS/QHP OP CAR RHAB W/ECG: CPT

## 2020-12-07 ENCOUNTER — HOSPITAL ENCOUNTER (OUTPATIENT)
Dept: CARDIAC REHAB | Age: 60
Setting detail: THERAPIES SERIES
Discharge: HOME | End: 2020-12-07
Payer: COMMERCIAL

## 2020-12-07 PROCEDURE — 93798 PHYS/QHP OP CAR RHAB W/ECG: CPT

## 2020-12-09 ENCOUNTER — HOSPITAL ENCOUNTER (OUTPATIENT)
Dept: CARDIAC REHAB | Age: 60
Setting detail: THERAPIES SERIES
Discharge: HOME | End: 2020-12-09
Payer: COMMERCIAL

## 2020-12-09 PROCEDURE — 93798 PHYS/QHP OP CAR RHAB W/ECG: CPT

## 2021-01-12 ENCOUNTER — OFFICE VISIT (OUTPATIENT)
Dept: CARDIOLOGY CLINIC | Age: 61
End: 2021-01-12
Payer: COMMERCIAL

## 2021-01-12 VITALS
SYSTOLIC BLOOD PRESSURE: 110 MMHG | BODY MASS INDEX: 25.21 KG/M2 | TEMPERATURE: 97.1 F | WEIGHT: 161 LBS | HEART RATE: 60 BPM | DIASTOLIC BLOOD PRESSURE: 80 MMHG

## 2021-01-12 DIAGNOSIS — I25.10 CAD IN NATIVE ARTERY: ICD-10-CM

## 2021-01-12 DIAGNOSIS — I25.2 MI, OLD: ICD-10-CM

## 2021-01-12 DIAGNOSIS — I10 ESSENTIAL HYPERTENSION: Primary | ICD-10-CM

## 2021-01-12 DIAGNOSIS — Z95.1 HX OF CABG: ICD-10-CM

## 2021-01-12 DIAGNOSIS — E78.5 HYPERLIPIDEMIA, UNSPECIFIED HYPERLIPIDEMIA TYPE: ICD-10-CM

## 2021-01-12 PROCEDURE — G8484 FLU IMMUNIZE NO ADMIN: HCPCS | Performed by: INTERNAL MEDICINE

## 2021-01-12 PROCEDURE — G8427 DOCREV CUR MEDS BY ELIG CLIN: HCPCS | Performed by: INTERNAL MEDICINE

## 2021-01-12 PROCEDURE — 99214 OFFICE O/P EST MOD 30 MIN: CPT | Performed by: INTERNAL MEDICINE

## 2021-01-12 PROCEDURE — 3017F COLORECTAL CA SCREEN DOC REV: CPT | Performed by: INTERNAL MEDICINE

## 2021-01-12 PROCEDURE — G8419 CALC BMI OUT NRM PARAM NOF/U: HCPCS | Performed by: INTERNAL MEDICINE

## 2021-01-12 PROCEDURE — 1036F TOBACCO NON-USER: CPT | Performed by: INTERNAL MEDICINE

## 2021-01-12 ASSESSMENT — ENCOUNTER SYMPTOMS
CHEST TIGHTNESS: 0
SHORTNESS OF BREATH: 0
COUGH: 0
CHOKING: 0

## 2021-01-12 NOTE — PROGRESS NOTES
Subjective:      Patient ID: Enzo Scott is a 63 y.o. female. HPI Here for follow up CAD/CABG/MI/HTN/Lipids. Doing well. Did rehab. Exercising at home. No angina. Breathing good. Off smokes. No pnd. No orthopnea. Wt down. No tachy/syncope.      Past Medical History:   Diagnosis Date    Arthritis     Hypertension     Postoperative atrial fibrillation ([address] 2020    S/P CABG x 3 2020    S/P left atrial appendage ligation 2020     Past Surgical History:   Procedure Laterality Date    CARDIAC CATHETERIZATION  2020    Dr. Ksenia Baez Right    [address] GRAFT Left 2020    Dr. Cristi Gautam -  x3 (LIMA-LAD w/ 5.2 cm endarterectomy of LAD, L SVG-OM2, SVG-distal RCA); 40mm Atriclip placed to NATALIE; pectoral block per anesthesia    TOTAL KNEE ARTHROPLASTY Right 2009     Social History     Socioeconomic History    Marital status: Single     Spouse name: Not on file    Number of children: Not on file    Years of education: Not on file    Highest education level: Not on file   Occupational History    Not on file   Social Needs    Financial resource strain: Not on file    Food insecurity     Worry: Not on file     Inability: Not on file    Transportation needs     Medical: Not on file     Non-medical: Not on file   Tobacco Use    Smoking status: Former Smoker     Packs/day: 0.50     Years: 20.00     Pack years: 10.00     Types: Cigarettes     Quit date: 2020     Years since quittin.3    Smokeless tobacco: Never Used   Substance and Sexual Activity    Alcohol use: No    Drug use: No    Sexual activity: Yes     Partners: Male   Lifestyle    Physical activity     Days per week: Not on file     Minutes per session: Not on file    Stress: Not on file   Relationships    Social connections     Talks on phone: Not on file     Gets together: Not on file     Attends Mandaen service: Not on file     Active member of club or organization: Not on file     Attends meetings of clubs or organizations: Not on file     Relationship status: Not on file    Intimate partner violence     Fear of current or ex partner: Not on file     Emotionally abused: Not on file     Physically abused: Not on file     Forced sexual activity: Not on file   Other Topics Concern    Not on file   Social History Narrative    Not on file     FH reviewed, Mo/Fa with MI    Vitals:    01/12/21 0902   BP: 110/80   Pulse: 60   Temp: 97.1 °F (36.2 °C)       Wt 161    Review of Systems   Constitutional: Negative for activity change, appetite change and fatigue. Respiratory: Negative for cough, choking, chest tightness and shortness of breath. Cardiovascular: Negative for chest pain, palpitations and leg swelling. Denies PND or orthopnea. No tachycardia or syncope. Neurological: Negative for dizziness, syncope and light-headedness. Psychiatric/Behavioral: Negative for agitation, behavioral problems and confusion. All other systems reviewed and are negative. Objective:   Physical Exam  Constitutional:       General: She is not in acute distress. Appearance: Normal appearance. She is well-developed. HENT:      Head: Normocephalic and atraumatic. Right Ear: External ear normal.      Left Ear: External ear normal.   Neck:      Musculoskeletal: Normal range of motion. Vascular: No JVD. Cardiovascular:      Rate and Rhythm: Normal rate and regular rhythm. Heart sounds: Normal heart sounds. No murmur. No gallop. Pulmonary:      Effort: Pulmonary effort is normal. No respiratory distress. Breath sounds: Normal breath sounds. No wheezing or rales. Abdominal:      General: Bowel sounds are normal.      Palpations: Abdomen is soft. Tenderness: There is no abdominal tenderness. Musculoskeletal: Normal range of motion. Skin:     General: Skin is warm and dry. Neurological:      General: No focal deficit present.       Mental Status: She is oriented to person, place, and time. Psychiatric:         Mood and Affect: Mood normal.         Behavior: Behavior normal.         Assessment:       Diagnosis Orders   1. CAD in native artery     2. Hx of CABG     3. MI, old     4. Essential hypertension     5. Hyperlipidemia, unspecified hyperlipidemia type             Plan:      CV stable. Doing well. Rhythm appears stable. Stop amio. Reviewed previous records and testing including hosp for CABG/MI. Follow up 3 months. Lipid/lfts.         Lakisha Peterson MD

## 2021-01-22 RX ORDER — ATORVASTATIN CALCIUM 80 MG/1
80 TABLET, FILM COATED ORAL NIGHTLY
Qty: 30 TABLET | Refills: 3 | Status: SHIPPED | OUTPATIENT
Start: 2021-01-22 | End: 2021-04-15 | Stop reason: SDUPTHER

## 2021-01-22 RX ORDER — CLOPIDOGREL BISULFATE 75 MG/1
75 TABLET ORAL DAILY
Qty: 30 TABLET | Refills: 3 | Status: SHIPPED | OUTPATIENT
Start: 2021-01-22 | End: 2021-04-15 | Stop reason: SDUPTHER

## 2021-01-22 RX ORDER — POTASSIUM CHLORIDE 750 MG/1
10 TABLET, EXTENDED RELEASE ORAL 2 TIMES DAILY
Qty: 60 TABLET | Refills: 3 | Status: SHIPPED | OUTPATIENT
Start: 2021-01-22 | End: 2021-04-15 | Stop reason: SDUPTHER

## 2021-01-22 NOTE — TELEPHONE ENCOUNTER
[facility] Medication Refills:    Aspirin 325 MG EC tablet   Take 1 tablet by mouth daily, Disp-30 tablet,R-3    atorvastatin (LIPITOR) 80 MG tablet   Take 1 tablet by mouth nightly, Disp-30 tablet,R-3    clopidogrel (PLAVIX) 75 MG tablet   Take 1 tablet by mouth daily, Disp-30 tablet,R-3     metoprolol tartrate (LOPRESSOR) 25 MG tablet   Take 0.5 tablets by mouth 2 times daily, Disp-60 tablet,R-3     potassium chloride (KLOR-CON M) 10 MEQ extended release tablet   Take 1 tablet by mouth 2 times daily, Disp-60 tablet,R-3     Pharmacy:[facility] [address] [place] [address] Rd, [place] [address] [facility] [phone]        Last Office Visit: 01/12/21    Next Office Visit: 04/15/21    Last Refill: 09/26/20    Last Labs: 09/26/20

## 2021-03-26 DIAGNOSIS — I25.10 CAD IN NATIVE ARTERY: ICD-10-CM

## 2021-03-26 DIAGNOSIS — I10 ESSENTIAL HYPERTENSION: ICD-10-CM

## 2021-03-26 DIAGNOSIS — E78.5 HYPERLIPIDEMIA, UNSPECIFIED HYPERLIPIDEMIA TYPE: ICD-10-CM

## 2021-03-26 DIAGNOSIS — Z95.1 HX OF CABG: ICD-10-CM

## 2021-03-26 DIAGNOSIS — I25.2 MI, OLD: ICD-10-CM

## 2021-03-26 LAB
CHOLESTEROL, FASTING: 141 MG/DL (ref 0–199)
HDLC SERPL-MCNC: 33 MG/DL (ref 40–60)
LDL CHOLESTEROL CALCULATED: 90 MG/DL
TRIGLYCERIDE, FASTING: 92 MG/DL (ref 0–150)
VLDLC SERPL CALC-MCNC: 18 MG/DL

## 2021-04-15 ENCOUNTER — OFFICE VISIT (OUTPATIENT)
Dept: CARDIOLOGY CLINIC | Age: 61
End: 2021-04-15
Payer: COMMERCIAL

## 2021-04-15 VITALS
BODY MASS INDEX: 23.96 KG/M2 | HEART RATE: 60 BPM | SYSTOLIC BLOOD PRESSURE: 130 MMHG | WEIGHT: 153 LBS | DIASTOLIC BLOOD PRESSURE: 80 MMHG

## 2021-04-15 DIAGNOSIS — E78.5 HYPERLIPIDEMIA, UNSPECIFIED HYPERLIPIDEMIA TYPE: ICD-10-CM

## 2021-04-15 DIAGNOSIS — I10 ESSENTIAL HYPERTENSION: ICD-10-CM

## 2021-04-15 DIAGNOSIS — I25.10 CAD IN NATIVE ARTERY: Primary | ICD-10-CM

## 2021-04-15 DIAGNOSIS — Z95.1 HX OF CABG: ICD-10-CM

## 2021-04-15 DIAGNOSIS — I25.2 MI, OLD: ICD-10-CM

## 2021-04-15 PROCEDURE — 1036F TOBACCO NON-USER: CPT | Performed by: INTERNAL MEDICINE

## 2021-04-15 PROCEDURE — G8427 DOCREV CUR MEDS BY ELIG CLIN: HCPCS | Performed by: INTERNAL MEDICINE

## 2021-04-15 PROCEDURE — 99213 OFFICE O/P EST LOW 20 MIN: CPT | Performed by: INTERNAL MEDICINE

## 2021-04-15 PROCEDURE — 3017F COLORECTAL CA SCREEN DOC REV: CPT | Performed by: INTERNAL MEDICINE

## 2021-04-15 PROCEDURE — G8420 CALC BMI NORM PARAMETERS: HCPCS | Performed by: INTERNAL MEDICINE

## 2021-04-15 RX ORDER — CLOPIDOGREL BISULFATE 75 MG/1
75 TABLET ORAL DAILY
Qty: 30 TABLET | Refills: 3 | Status: SHIPPED | OUTPATIENT
Start: 2021-04-15 | End: 2021-09-17

## 2021-04-15 RX ORDER — POTASSIUM CHLORIDE 750 MG/1
10 TABLET, EXTENDED RELEASE ORAL 2 TIMES DAILY
Qty: 60 TABLET | Refills: 3 | Status: SHIPPED | OUTPATIENT
Start: 2021-04-15 | End: 2021-09-17

## 2021-04-15 RX ORDER — ATORVASTATIN CALCIUM 80 MG/1
80 TABLET, FILM COATED ORAL NIGHTLY
Qty: 30 TABLET | Refills: 3 | Status: SHIPPED | OUTPATIENT
Start: 2021-04-15 | End: 2021-09-17

## 2021-04-15 RX ORDER — FUROSEMIDE 20 MG/1
20 TABLET ORAL DAILY
Qty: 60 TABLET | Refills: 3 | Status: SHIPPED | OUTPATIENT
Start: 2021-04-15 | End: 2022-01-17

## 2021-04-15 ASSESSMENT — ENCOUNTER SYMPTOMS
COUGH: 0
CHEST TIGHTNESS: 0
CHOKING: 0
SHORTNESS OF BREATH: 0

## 2021-04-15 NOTE — PROGRESS NOTES
Subjective:      Patient ID: Sourav Santos is a 57 y.o. female. Coronary Artery Disease  Pertinent negatives include no chest pain, chest tightness, dizziness, leg swelling, palpitations or shortness of breath. Here for follow up CAD/CABG/MI/HTN/Lipids. Doing well. Did rehab. Exercising at home. No angina. Breathing good. Off smokes. No pnd. No orthopnea. Wt down. No tachy/syncope.      Past Medical History:   Diagnosis Date    Arthritis     Hypertension     Postoperative atrial fibrillation ([address] 2020    S/P CABG x 3 2020    S/P left atrial appendage ligation 2020     Past Surgical History:   Procedure Laterality Date    CARDIAC CATHETERIZATION  2020    Dr. Lucinda Rose Right    [address] GRAFT Left 2020    Dr. Josefina Pradhan -  x3 (LIMA-LAD w/ 5.2 cm endarterectomy of LAD, L SVG-OM2, SVG-distal RCA); 40mm Atriclip placed to NATALIE; pectoral block per anesthesia    TOTAL KNEE ARTHROPLASTY Right 2009     Social History     Socioeconomic History    Marital status: Single     Spouse name: Not on file    Number of children: Not on file    Years of education: Not on file    Highest education level: Not on file   Occupational History    Not on file   Social Needs    Financial resource strain: Not on file    Food insecurity     Worry: Not on file     Inability: Not on file    Transportation needs     Medical: Not on file     Non-medical: Not on file   Tobacco Use    Smoking status: Former Smoker     Packs/day: 0.50     Years: 20.00     Pack years: 10.00     Types: Cigarettes     Quit date: 2020     Years since quittin.6    Smokeless tobacco: Never Used   Substance and Sexual Activity    Alcohol use: No    Drug use: No    Sexual activity: Yes     Partners: Male   Lifestyle    Physical activity     Days per week: Not on file     Minutes per session: Not on file    Stress: Not on file   Relationships    Social connections Talks on phone: Not on file     Gets together: Not on file     Attends Adventist service: Not on file     Active member of club or organization: Not on file     Attends meetings of clubs or organizations: Not on file     Relationship status: Not on file    Intimate partner violence     Fear of current or ex partner: Not on file     Emotionally abused: Not on file     Physically abused: Not on file     Forced sexual activity: Not on file   Other Topics Concern    Not on file   Social History Narrative    Not on file     FH reviewed, Mo/Fa with MI    Vitals:    04/15/21 0955   BP: 130/80   Pulse: 60       Wt 153    Review of Systems   Constitutional: Negative for activity change, appetite change and fatigue. Respiratory: Negative for cough, choking, chest tightness and shortness of breath. Cardiovascular: Negative for chest pain, palpitations and leg swelling. Denies PND or orthopnea. No tachycardia or syncope. Neurological: Negative for dizziness, syncope and light-headedness. Psychiatric/Behavioral: Negative for agitation, behavioral problems and confusion. All other systems reviewed and are negative. Objective:   Physical Exam  Constitutional:       General: She is not in acute distress. Appearance: Normal appearance. She is well-developed. HENT:      Head: Normocephalic and atraumatic. Right Ear: External ear normal.      Left Ear: External ear normal.   Neck:      Musculoskeletal: Normal range of motion. Vascular: No JVD. Cardiovascular:      Rate and Rhythm: Normal rate and regular rhythm. Heart sounds: Normal heart sounds. No murmur. No gallop. Pulmonary:      Effort: Pulmonary effort is normal. No respiratory distress. Breath sounds: Normal breath sounds. No wheezing or rales. Abdominal:      General: Bowel sounds are normal.      Palpations: Abdomen is soft. Tenderness: There is no abdominal tenderness. Musculoskeletal: Normal range of motion.

## 2021-07-22 ENCOUNTER — OFFICE VISIT (OUTPATIENT)
Dept: CARDIOLOGY CLINIC | Age: 61
End: 2021-07-22
Payer: COMMERCIAL

## 2021-07-22 VITALS
HEIGHT: 67 IN | DIASTOLIC BLOOD PRESSURE: 90 MMHG | SYSTOLIC BLOOD PRESSURE: 140 MMHG | HEART RATE: 64 BPM | WEIGHT: 145.6 LBS | BODY MASS INDEX: 22.85 KG/M2

## 2021-07-22 DIAGNOSIS — I25.10 CAD IN NATIVE ARTERY: Primary | ICD-10-CM

## 2021-07-22 DIAGNOSIS — Z95.1 HX OF CABG: ICD-10-CM

## 2021-07-22 DIAGNOSIS — I10 ESSENTIAL HYPERTENSION: ICD-10-CM

## 2021-07-22 DIAGNOSIS — I25.2 MI, OLD: ICD-10-CM

## 2021-07-22 DIAGNOSIS — E78.5 HYPERLIPIDEMIA, UNSPECIFIED HYPERLIPIDEMIA TYPE: ICD-10-CM

## 2021-07-22 PROCEDURE — 99213 OFFICE O/P EST LOW 20 MIN: CPT | Performed by: INTERNAL MEDICINE

## 2021-07-22 PROCEDURE — 1036F TOBACCO NON-USER: CPT | Performed by: INTERNAL MEDICINE

## 2021-07-22 PROCEDURE — G8427 DOCREV CUR MEDS BY ELIG CLIN: HCPCS | Performed by: INTERNAL MEDICINE

## 2021-07-22 PROCEDURE — G8420 CALC BMI NORM PARAMETERS: HCPCS | Performed by: INTERNAL MEDICINE

## 2021-07-22 PROCEDURE — 3017F COLORECTAL CA SCREEN DOC REV: CPT | Performed by: INTERNAL MEDICINE

## 2021-07-22 ASSESSMENT — ENCOUNTER SYMPTOMS
CHEST TIGHTNESS: 0
CHOKING: 0
COUGH: 0
SHORTNESS OF BREATH: 0

## 2021-07-22 NOTE — PROGRESS NOTES
Subjective:      Patient ID: Stefan Stein is a 57 y.o. female. Coronary Artery Disease  Pertinent negatives include no chest pain, chest tightness, dizziness, leg swelling, palpitations or shortness of breath. Here for follow up CAD/CABG/MI/HTN/Lipids. Doing well. Exercising at home. No angina. Breathing good. Off smokes. No pnd. No orthopnea. Wt down. No tachy/syncope.      Past Medical History:   Diagnosis Date    Arthritis     Hypertension     Postoperative atrial fibrillation ([address] 2020    S/P CABG x 3 2020    S/P left atrial appendage ligation 2020     Past Surgical History:   Procedure Laterality Date    CARDIAC CATHETERIZATION  2020    Dr. Kristi Fulton Right    [address] GRA[address] Left 2020    Dr. Shalini Lau -  x3 (LIMA-LAD w/ 5.2 cm endarterectomy of LAD, L SVG-OM2, SVG-distal RCA); 40mm Atriclip placed to NATALIE; pectoral block per anesthesia    TOTAL KNEE ARTHROPLASTY Right 2009     Social History     Socioeconomic History    Marital status: Single     Spouse name: Not on file    Number of children: Not on file    Years of education: Not on file    Highest education level: Not on file   Occupational History    Not on file   Tobacco Use    Smoking status: Former Smoker     Packs/day: 0.50     Years: 20.00     Pack years: 10.00     Types: Cigarettes     Quit date: 2020     Years since quittin.8    Smokeless tobacco: Never Used   Vaping Use    Vaping Use: Never used   Substance and Sexual Activity    Alcohol use: No    Drug use: No    Sexual activity: Yes     Partners: Male   Other Topics Concern    Not on file   Social History Narrative    Not on file     Social Determinants of Health     Financial Resource Strain:     Difficulty of Paying Living Expenses:    Food Insecurity:     Worried About Running Out of Food in the Last Year:     Ran Out of Food in the Last Year:    Transportation Needs:     Lack of Transportation (Medical):  Lack of Transportation (Non-Medical):    Physical Activity:     Days of Exercise per Week:     Minutes of Exercise per Session:    Stress:     Feeling of Stress :    Social Connections:     Frequency of Communication with Friends and Family:     Frequency of Social Gatherings with Friends and Family:     Attends Islam Services:     Active Member of Clubs or Organizations:     Attends Club or Organization Meetings:     Marital Status:    Intimate Partner Violence:     Fear of Current or Ex-Partner:     Emotionally Abused:     Physically Abused:     Sexually Abused:      FH reviewed, Mo/Fa with MI    Vitals:    07/22/21 1038   BP: (!) 140/90   Pulse: 64       Wt 145    Review of Systems   Constitutional: Negative for activity change, appetite change and fatigue. Respiratory: Negative for cough, choking, chest tightness and shortness of breath. Cardiovascular: Negative for chest pain, palpitations and leg swelling. Denies PND or orthopnea. No tachycardia or syncope. Neurological: Negative for dizziness, syncope and light-headedness. Psychiatric/Behavioral: Negative for agitation, behavioral problems and confusion. All other systems reviewed and are negative. Objective:   Physical Exam  Constitutional:       General: She is not in acute distress. Appearance: Normal appearance. She is well-developed. HENT:      Head: Normocephalic and atraumatic. Right Ear: External ear normal.      Left Ear: External ear normal.   Neck:      Vascular: No JVD. Cardiovascular:      Rate and Rhythm: Normal rate and regular rhythm. Heart sounds: Normal heart sounds. No murmur heard. No gallop. Pulmonary:      Effort: Pulmonary effort is normal. No respiratory distress. Breath sounds: Normal breath sounds. No wheezing or rales. Abdominal:      General: Bowel sounds are normal.      Palpations: Abdomen is soft. Tenderness:  There is no abdominal tenderness. Musculoskeletal:         General: Normal range of motion. Cervical back: Normal range of motion. Skin:     General: Skin is warm and dry. Neurological:      General: No focal deficit present. Mental Status: She is oriented to person, place, and time. Psychiatric:         Mood and Affect: Mood normal.         Behavior: Behavior normal.         Assessment:       Diagnosis Orders   1. CAD in native artery     2. Hx of CABG     3. MI, old     4. Essential hypertension     5. Hyperlipidemia, unspecified hyperlipidemia type             Plan:      CV stable. Doing well. Exercising. Rhythm appears stable. Spot check bp. Reviewed previous records and testing including hosp for CABG/MI. Follow up 3 months.          Pamela Youssef MD

## 2021-09-17 NOTE — TELEPHONE ENCOUNTER
Requested Prescriptions     Pending Prescriptions Disp Refills    potassium chloride (KLOR-CON M) 10 MEQ extended release tablet [Pharmacy Med Name: POTASSIUM CHLORIDE ER M-10 MEQ TAB] 180 tablet 3     Sig: TAKE ONE TABLET BY MOUTH TWICE A DAY    clopidogrel (PLAVIX) 75 MG tablet [Pharmacy Med Name: CLOPIDOGREL 75 MG TABLET] 90 tablet 3     Sig: TAKE ONE TABLET BY MOUTH DAILY    atorvastatin (LIPITOR) 80 MG tablet [Pharmacy Med Name: ATORVASTATIN 80 MG TABLET] 90 tablet 3     Sig: TAKE ONE TABLET BY MOUTH ONCE NIGHTLY                  Last Office Visit: 7/22/2021     Next Office Visit: 10/8/2021

## 2021-09-23 RX ORDER — POTASSIUM CHLORIDE 750 MG/1
TABLET, EXTENDED RELEASE ORAL
Qty: 180 TABLET | Refills: 3 | Status: SHIPPED | OUTPATIENT
Start: 2021-09-23 | End: 2022-09-15

## 2021-09-23 RX ORDER — ATORVASTATIN CALCIUM 80 MG/1
TABLET, FILM COATED ORAL
Qty: 90 TABLET | Refills: 3 | Status: SHIPPED | OUTPATIENT
Start: 2021-09-23 | End: 2022-09-15

## 2021-09-23 RX ORDER — CLOPIDOGREL BISULFATE 75 MG/1
TABLET ORAL
Qty: 90 TABLET | Refills: 3 | Status: SHIPPED | OUTPATIENT
Start: 2021-09-23 | End: 2022-09-15

## 2021-10-08 ENCOUNTER — OFFICE VISIT (OUTPATIENT)
Dept: CARDIOLOGY CLINIC | Age: 61
End: 2021-10-08
Payer: COMMERCIAL

## 2021-10-08 VITALS
HEIGHT: 67 IN | DIASTOLIC BLOOD PRESSURE: 72 MMHG | HEART RATE: 65 BPM | BODY MASS INDEX: 22.79 KG/M2 | SYSTOLIC BLOOD PRESSURE: 128 MMHG | WEIGHT: 145.2 LBS | OXYGEN SATURATION: 99 %

## 2021-10-08 DIAGNOSIS — I25.2 MI, OLD: ICD-10-CM

## 2021-10-08 DIAGNOSIS — I25.10 CAD IN NATIVE ARTERY: Primary | ICD-10-CM

## 2021-10-08 DIAGNOSIS — I10 PRIMARY HYPERTENSION: ICD-10-CM

## 2021-10-08 DIAGNOSIS — Z95.1 HX OF CABG: ICD-10-CM

## 2021-10-08 DIAGNOSIS — E78.5 HYPERLIPIDEMIA, UNSPECIFIED HYPERLIPIDEMIA TYPE: ICD-10-CM

## 2021-10-08 PROCEDURE — 1036F TOBACCO NON-USER: CPT | Performed by: INTERNAL MEDICINE

## 2021-10-08 PROCEDURE — G8420 CALC BMI NORM PARAMETERS: HCPCS | Performed by: INTERNAL MEDICINE

## 2021-10-08 PROCEDURE — G8484 FLU IMMUNIZE NO ADMIN: HCPCS | Performed by: INTERNAL MEDICINE

## 2021-10-08 PROCEDURE — G8427 DOCREV CUR MEDS BY ELIG CLIN: HCPCS | Performed by: INTERNAL MEDICINE

## 2021-10-08 PROCEDURE — 3017F COLORECTAL CA SCREEN DOC REV: CPT | Performed by: INTERNAL MEDICINE

## 2021-10-08 PROCEDURE — 99214 OFFICE O/P EST MOD 30 MIN: CPT | Performed by: INTERNAL MEDICINE

## 2021-10-08 ASSESSMENT — ENCOUNTER SYMPTOMS
CHOKING: 0
CHEST TIGHTNESS: 0
SHORTNESS OF BREATH: 0
COUGH: 0

## 2021-10-08 NOTE — PROGRESS NOTES
Subjective:      Patient ID: Mandi Arcos is a 57 y.o. female. Coronary Artery Disease  Pertinent negatives include no chest pain, chest tightness, dizziness, leg swelling, palpitations or shortness of breath. Here for follow up CAD/CABG/MI/HTN/Lipids. Doing well. Exercising at home. Walking. BP good at home. No angina. Breathing good. Off smokes. No pnd. No orthopnea. Wt down. No tachy/syncope.      Past Medical History:   Diagnosis Date    Arthritis     Hypertension     Postoperative atrial fibrillation ([address] 2020    S/P CABG x 3 2020    S/P left atrial appendage ligation 2020     Past Surgical History:   Procedure Laterality Date    CARDIAC CATHETERIZATION  2020    Dr. Jorge Alberto Carranza Right    [address] 2020    Dr. Leslie Lewis -  x3 (LIMA-LAD w/ 5.2 cm endarterectomy of LAD, L SVG-OM2, SVG-distal RCA); 40mm Atriclip placed to NATALIE; pectoral block per anesthesia    TOTAL KNEE ARTHROPLASTY Right      Social History     Socioeconomic History    Marital status: Single     Spouse name: Not on file    Number of children: Not on file    Years of education: Not on file    Highest education level: Not on file   Occupational History    Not on file   Tobacco Use    Smoking status: Former Smoker     Packs/day: 0.50     Years: 20.00     Pack years: 10.00     Types: Cigarettes     Quit date: 2020     Years since quittin.1    Smokeless tobacco: Never Used   Vaping Use    Vaping Use: Never used   Substance and Sexual Activity    Alcohol use: No    Drug use: No    Sexual activity: Yes     Partners: Male   Other Topics Concern    Not on file   Social History Narrative    Not on file     Social Determinants of Health     Financial Resource Strain:     Difficulty of Paying Living Expenses:    Food Insecurity:     Worried About Running Out of Food in the Last Year:     Brandi of Food in the Last Year: Transportation Needs:     Lack of Transportation (Medical):  Lack of Transportation (Non-Medical):    Physical Activity:     Days of Exercise per Week:     Minutes of Exercise per Session:    Stress:     Feeling of Stress :    Social Connections:     Frequency of Communication with Friends and Family:     Frequency of Social Gatherings with Friends and Family:     Attends Protestant Services:     Active Member of Clubs or Organizations:     Attends Club or Organization Meetings:     Marital Status:    Intimate Partner Violence:     Fear of Current or Ex-Partner:     Emotionally Abused:     Physically Abused:     Sexually Abused:      FH reviewed, Mo/Fa with MI    Vitals:    10/08/21 0952   BP: 128/72   Pulse: 65   SpO2: 99%       Wt 145    Review of Systems   Constitutional: Negative for activity change, appetite change and fatigue. Respiratory: Negative for cough, choking, chest tightness and shortness of breath. Cardiovascular: Negative for chest pain, palpitations and leg swelling. Denies PND or orthopnea. No tachycardia or syncope. Neurological: Negative for dizziness, syncope and light-headedness. Psychiatric/Behavioral: Negative for agitation, behavioral problems and confusion. All other systems reviewed and are negative. Objective:   Physical Exam  Constitutional:       General: She is not in acute distress. Appearance: Normal appearance. She is well-developed. HENT:      Head: Normocephalic and atraumatic. Right Ear: External ear normal.      Left Ear: External ear normal.   Neck:      Vascular: No JVD. Cardiovascular:      Rate and Rhythm: Normal rate and regular rhythm. Heart sounds: Normal heart sounds. No murmur heard. No gallop. Pulmonary:      Effort: Pulmonary effort is normal. No respiratory distress. Breath sounds: Normal breath sounds. No wheezing or rales.    Abdominal:      General: Bowel sounds are normal.      Palpations: Abdomen is soft. Tenderness: There is no abdominal tenderness. Musculoskeletal:         General: Normal range of motion. Cervical back: Normal range of motion. Skin:     General: Skin is warm and dry. Neurological:      General: No focal deficit present. Mental Status: She is oriented to person, place, and time. Psychiatric:         Mood and Affect: Mood normal.         Behavior: Behavior normal.         Assessment:       Diagnosis Orders   1. CAD in native artery     2. Hx of CABG     3. MI, old     4. Primary hypertension     5. Hyperlipidemia, unspecified hyperlipidemia type             Plan:      CV stable. Doing well. Exercising. Rhythm appears stable. Will continue ASA/lipitor/metoprolol. Stop plavix post cabg. Reviewed previous records and testing including hosp for CABG/MI. Lipids per PCP. Follow up 6 months.          Guanakito Weiner MD

## 2021-10-20 NOTE — TELEPHONE ENCOUNTER
Requested Prescriptions     Pending Prescriptions Disp Refills    metoprolol tartrate (LOPRESSOR) 25 MG tablet [Pharmacy Med Name: METOPROLOL TARTRATE 25 MG TAB] 180 tablet 3     Sig: TAKE 1/2 TABLET BY MOUTH TWICE A DAY              Last Office Visit: 10/8/2021     Next Office Visit: 4/22/2022

## 2022-01-17 NOTE — TELEPHONE ENCOUNTER
Requested Prescriptions     Pending Prescriptions Disp Refills    furosemide (LASIX) 20 MG tablet [Pharmacy Med Name: FUROSEMIDE 20 MG TABLET] 30 tablet      Sig: TAKE ONE TABLET BY MOUTH DAILY          Number: 30    Refills: 5    Last Office Visit: 10/8/2021     Next Office Visit: 4/22/22

## 2022-01-21 RX ORDER — FUROSEMIDE 20 MG/1
TABLET ORAL
Qty: 30 TABLET | Refills: 5 | Status: SHIPPED | OUTPATIENT
Start: 2022-01-21 | End: 2022-07-19

## 2022-05-06 ENCOUNTER — OFFICE VISIT (OUTPATIENT)
Dept: CARDIOLOGY CLINIC | Age: 62
End: 2022-05-06
Payer: COMMERCIAL

## 2022-05-06 VITALS
DIASTOLIC BLOOD PRESSURE: 70 MMHG | HEART RATE: 60 BPM | SYSTOLIC BLOOD PRESSURE: 118 MMHG | WEIGHT: 147 LBS | BODY MASS INDEX: 23.02 KG/M2

## 2022-05-06 DIAGNOSIS — E78.5 HYPERLIPIDEMIA, UNSPECIFIED HYPERLIPIDEMIA TYPE: ICD-10-CM

## 2022-05-06 DIAGNOSIS — Z95.1 HX OF CABG: ICD-10-CM

## 2022-05-06 DIAGNOSIS — I25.10 CAD IN NATIVE ARTERY: Primary | ICD-10-CM

## 2022-05-06 DIAGNOSIS — I25.2 MI, OLD: ICD-10-CM

## 2022-05-06 DIAGNOSIS — I10 ESSENTIAL HYPERTENSION: ICD-10-CM

## 2022-05-06 PROCEDURE — 1036F TOBACCO NON-USER: CPT | Performed by: INTERNAL MEDICINE

## 2022-05-06 PROCEDURE — G8427 DOCREV CUR MEDS BY ELIG CLIN: HCPCS | Performed by: INTERNAL MEDICINE

## 2022-05-06 PROCEDURE — 3017F COLORECTAL CA SCREEN DOC REV: CPT | Performed by: INTERNAL MEDICINE

## 2022-05-06 PROCEDURE — G8420 CALC BMI NORM PARAMETERS: HCPCS | Performed by: INTERNAL MEDICINE

## 2022-05-06 PROCEDURE — 99214 OFFICE O/P EST MOD 30 MIN: CPT | Performed by: INTERNAL MEDICINE

## 2022-05-06 ASSESSMENT — ENCOUNTER SYMPTOMS
COUGH: 0
SHORTNESS OF BREATH: 0
CHEST TIGHTNESS: 0
CHOKING: 0
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
## 2022-07-18 NOTE — TELEPHONE ENCOUNTER
Requested Prescriptions     Pending Prescriptions Disp Refills    furosemide (LASIX) 20 MG tablet [Pharmacy Med Name: FUROSEMIDE 20 MG TABLET] 30 tablet 5     Sig: TAKE ONE TABLET BY MOUTH DAILY          Number:30    Refills: 5    Last Office Visit: 5/6/2022     Next Office Visit: 11/11/2022

## 2022-07-19 RX ORDER — FUROSEMIDE 20 MG/1
TABLET ORAL
Qty: 30 TABLET | Refills: 5 | Status: SHIPPED | OUTPATIENT
Start: 2022-07-19

## 2022-09-14 NOTE — TELEPHONE ENCOUNTER
Requested Prescriptions     Pending Prescriptions Disp Refills    potassium chloride (KLOR-CON M) 10 MEQ extended release tablet [Pharmacy Med Name: POTASSIUM CHLORIDE ER M-10 MEQ TAB] 180 tablet 3     Sig: TAKE ONE TABLET BY MOUTH TWICE A DAY    clopidogrel (PLAVIX) 75 MG tablet [Pharmacy Med Name: CLOPIDOGREL 75 MG TABLET] 90 tablet 3     Sig: TAKE ONE TABLET BY MOUTH DAILY    atorvastatin (LIPITOR) 80 MG tablet [Pharmacy Med Name: ATORVASTATIN 80 MG TABLET] 90 tablet 3     Sig: TAKE ONE TABLET BY MOUTH ONCE NIGHTLY              Last Office Visit: 5/6/2022     Next Office Visit: 11/11/2022

## 2022-09-15 RX ORDER — CLOPIDOGREL BISULFATE 75 MG/1
TABLET ORAL
Qty: 90 TABLET | Refills: 3 | Status: SHIPPED | OUTPATIENT
Start: 2022-09-15

## 2022-09-15 RX ORDER — POTASSIUM CHLORIDE 750 MG/1
TABLET, EXTENDED RELEASE ORAL
Qty: 180 TABLET | Refills: 3 | Status: SHIPPED | OUTPATIENT
Start: 2022-09-15

## 2022-09-15 RX ORDER — ATORVASTATIN CALCIUM 80 MG/1
TABLET, FILM COATED ORAL
Qty: 90 TABLET | Refills: 3 | Status: SHIPPED | OUTPATIENT
Start: 2022-09-15

## 2022-11-02 DIAGNOSIS — E78.5 HYPERLIPIDEMIA, UNSPECIFIED HYPERLIPIDEMIA TYPE: ICD-10-CM

## 2022-11-02 DIAGNOSIS — I25.2 MI, OLD: ICD-10-CM

## 2022-11-02 DIAGNOSIS — Z95.1 HX OF CABG: ICD-10-CM

## 2022-11-02 DIAGNOSIS — I10 ESSENTIAL HYPERTENSION: ICD-10-CM

## 2022-11-02 DIAGNOSIS — I25.10 CAD IN NATIVE ARTERY: ICD-10-CM

## 2022-11-02 LAB
ALBUMIN SERPL-MCNC: 4.1 G/DL (ref 3.4–5)
ALP BLD-CCNC: 146 U/L (ref 40–129)
ALT SERPL-CCNC: 27 U/L (ref 10–40)
AST SERPL-CCNC: 27 U/L (ref 15–37)
BILIRUB SERPL-MCNC: 0.5 MG/DL (ref 0–1)
BILIRUBIN DIRECT: <0.2 MG/DL (ref 0–0.3)
BILIRUBIN, INDIRECT: ABNORMAL MG/DL (ref 0–1)
CHOLESTEROL, TOTAL: 144 MG/DL (ref 0–199)
HDLC SERPL-MCNC: 45 MG/DL (ref 40–60)
LDL CHOLESTEROL CALCULATED: 86 MG/DL
TOTAL PROTEIN: 6.4 G/DL (ref 6.4–8.2)
TRIGL SERPL-MCNC: 67 MG/DL (ref 0–150)
VLDLC SERPL CALC-MCNC: 13 MG/DL

## 2022-11-11 ENCOUNTER — OFFICE VISIT (OUTPATIENT)
Dept: CARDIOLOGY CLINIC | Age: 62
End: 2022-11-11
Payer: COMMERCIAL

## 2022-11-11 VITALS
HEART RATE: 63 BPM | BODY MASS INDEX: 23.65 KG/M2 | DIASTOLIC BLOOD PRESSURE: 84 MMHG | WEIGHT: 151 LBS | SYSTOLIC BLOOD PRESSURE: 118 MMHG

## 2022-11-11 DIAGNOSIS — I25.2 MI, OLD: ICD-10-CM

## 2022-11-11 DIAGNOSIS — R00.2 PALPITATIONS: Primary | ICD-10-CM

## 2022-11-11 DIAGNOSIS — I25.10 CAD IN NATIVE ARTERY: ICD-10-CM

## 2022-11-11 DIAGNOSIS — E78.5 HYPERLIPIDEMIA, UNSPECIFIED HYPERLIPIDEMIA TYPE: ICD-10-CM

## 2022-11-11 DIAGNOSIS — Z95.1 HX OF CABG: ICD-10-CM

## 2022-11-11 DIAGNOSIS — I10 ESSENTIAL HYPERTENSION: ICD-10-CM

## 2022-11-11 PROCEDURE — 3078F DIAST BP <80 MM HG: CPT | Performed by: INTERNAL MEDICINE

## 2022-11-11 PROCEDURE — 99214 OFFICE O/P EST MOD 30 MIN: CPT | Performed by: INTERNAL MEDICINE

## 2022-11-11 PROCEDURE — 93000 ELECTROCARDIOGRAM COMPLETE: CPT | Performed by: INTERNAL MEDICINE

## 2022-11-11 PROCEDURE — G8420 CALC BMI NORM PARAMETERS: HCPCS | Performed by: INTERNAL MEDICINE

## 2022-11-11 PROCEDURE — 3017F COLORECTAL CA SCREEN DOC REV: CPT | Performed by: INTERNAL MEDICINE

## 2022-11-11 PROCEDURE — G8484 FLU IMMUNIZE NO ADMIN: HCPCS | Performed by: INTERNAL MEDICINE

## 2022-11-11 PROCEDURE — G8428 CUR MEDS NOT DOCUMENT: HCPCS | Performed by: INTERNAL MEDICINE

## 2022-11-11 PROCEDURE — 1036F TOBACCO NON-USER: CPT | Performed by: INTERNAL MEDICINE

## 2022-11-11 PROCEDURE — 3074F SYST BP LT 130 MM HG: CPT | Performed by: INTERNAL MEDICINE

## 2022-11-11 ASSESSMENT — ENCOUNTER SYMPTOMS
COUGH: 0
SHORTNESS OF BREATH: 0
CHOKING: 0
CHEST TIGHTNESS: 0

## 2022-11-11 NOTE — PROGRESS NOTES
Subjective:      Patient ID: Eric Booker is a 60 y.o. female. Coronary Artery Disease  Pertinent negatives include no chest pain, chest tightness, dizziness, leg swelling, palpitations or shortness of breath. Here for follow up CAD/CABG/MI/HTN/Lipids. Doing well. Rare racing heart. Lasts about minute. 3-4 episodes past 4 months. No sx otherwise. Uses 1 cup coffee per day. Exercising at home. Walking. BP good at home. No angina. Breathing good. Off smokes. No pnd. No orthopnea. Wt stable. No syncope.      Past Medical History:   Diagnosis Date    Arthritis     Hypertension     Postoperative atrial fibrillation ([facility] [address] 2020    S/P CABG x 3 2020    S/P left atrial appendage ligation 2020     Past Surgical History:   Procedure Laterality Date    CARDIAC CATHETERIZATION  2020    Dr. Krystyna Barahona Right     Clover Gonzalezs Left 2020    Dr. Lily Alas -  x3 (LIMA-LAD w/ 5.2 cm endarterectomy of LAD, L SVG-OM2, SVG-distal RCA); 40mm Atriclip placed to NATALIE; pectoral block per anesthesia    TOTAL KNEE ARTHROPLASTY Right 2009     Social History     Socioeconomic History    Marital status: Single     Spouse name: Not on file    Number of children: Not on file    Years of education: Not on file    Highest education level: Not on file   Occupational History    Not on file   Tobacco Use    Smoking status: Former     Packs/day: 0.50     Years: 20.00     Pack years: 10.00     Types: Cigarettes     Quit date: 2020     Years since quittin.1    Smokeless tobacco: Never   Vaping Use    Vaping Use: Never used   Substance and Sexual Activity    Alcohol use: No    Drug use: No    Sexual activity: Yes     Partners: Male   Other Topics Concern    Not on file   Social History Narrative    Not on file     Social Determinants of Health     Financial Resource Strain: Not on file   Food Insecurity: Not on file   Transportation Needs: Not on file   Physical Activity: Not on file   Stress: Not on file   Social Connections: Not on file   Intimate Partner Violence: Not on file   Housing Stability: Not on file     Gume Garcia reviewed, Mo/Fa with MI    Vitals:    11/11/22 1034   BP: 118/84         Wt 147    Review of Systems   Constitutional:  Negative for activity change, appetite change and fatigue. Respiratory:  Negative for cough, choking, chest tightness and shortness of breath. Cardiovascular:  Negative for chest pain, palpitations and leg swelling. Denies PND or orthopnea. No tachycardia or syncope. Neurological:  Negative for dizziness, syncope and light-headedness. Psychiatric/Behavioral:  Negative for agitation, behavioral problems and confusion. All other systems reviewed and are negative. Objective:   Physical Exam  Constitutional:       General: She is not in acute distress. Appearance: Normal appearance. She is well-developed. HENT:      Head: Normocephalic and atraumatic. Right Ear: External ear normal.      Left Ear: External ear normal.   Neck:      Vascular: No JVD. Cardiovascular:      Rate and Rhythm: Normal rate and regular rhythm. Heart sounds: Normal heart sounds. No murmur heard. No gallop. Pulmonary:      Effort: Pulmonary effort is normal. No respiratory distress. Breath sounds: Normal breath sounds. No wheezing or rales. Abdominal:      General: Bowel sounds are normal.      Palpations: Abdomen is soft. Tenderness: There is no abdominal tenderness. Musculoskeletal:         General: Normal range of motion. Cervical back: Normal range of motion. Skin:     General: Skin is warm and dry. Neurological:      General: No focal deficit present. Mental Status: She is oriented to person, place, and time. Psychiatric:         Mood and Affect: Mood normal.         Behavior: Behavior normal.       Assessment:       Diagnosis Orders   1. Palpitations  EKG 12 Lead      2. CAD in native artery        3.  Hx of CABG        4. MI, old        11. Essential hypertension        6. Hyperlipidemia, unspecified hyperlipidemia type                  Plan:      CV stable except rare episodes of racing heart. Doing well. Exercising some. EKG today shows NSR, Inf lat MI au, unchanged. Will continue ASA/lipitor/metoprolol. Will have echo. 30 day monitor. Reviewed previous records and testing including hosp for CABG/MI. Maribel Gudino Follow up after testing.  Richy Dos Santos MD

## 2022-11-21 ENCOUNTER — TELEPHONE (OUTPATIENT)
Dept: CARDIOLOGY CLINIC | Age: 62
End: 2022-11-21

## 2022-11-21 NOTE — TELEPHONE ENCOUNTER
Patient called about refilling her:     metoprolol tartrate (LOPRESSOR) 25 MG tablet [[id number]]     Order Details  Dose, Route, Frequency: As Directed   Dispense Quantity: 180 tablet Refills: 3          Sig: TAKE 1/2 TABLET BY MOUTH TWICE A DAY     Her pharmacy stated she didn't have a refill. Patient wants a call back on whether she's still on this medication.   Call back at  [phone]

## 2022-12-22 DIAGNOSIS — R00.2 PALPITATIONS: Primary | ICD-10-CM

## 2022-12-22 PROCEDURE — 93228 REMOTE 30 DAY ECG REV/REPORT: CPT | Performed by: INTERNAL MEDICINE

## 2023-01-04 ENCOUNTER — HOSPITAL ENCOUNTER (OUTPATIENT)
Dept: NON INVASIVE DIAGNOSTICS | Age: 63
Discharge: HOME | End: 2023-01-04
Payer: COMMERCIAL

## 2023-01-04 DIAGNOSIS — I25.10 CAD IN NATIVE ARTERY: ICD-10-CM

## 2023-01-04 DIAGNOSIS — R01.1 CARDIAC MURMUR: Primary | ICD-10-CM

## 2023-01-04 DIAGNOSIS — R00.2 PALPITATIONS: ICD-10-CM

## 2023-01-04 PROCEDURE — C8929 TTE W OR WO FOL WCON,DOPPLER: HCPCS

## 2023-01-04 PROCEDURE — 6360000004 HC RX CONTRAST MEDICATION: Performed by: INTERNAL MEDICINE

## 2023-01-04 RX ADMIN — PERFLUTREN 1.5 ML: 6.52 INJECTION, SUSPENSION INTRAVENOUS at 16:29

## 2023-01-16 NOTE — TELEPHONE ENCOUNTER
Requested Prescriptions     Pending Prescriptions Disp Refills    furosemide (LASIX) 20 MG tablet [Pharmacy Med Name: FUROSEMIDE 20 MG TABLET] 90 tablet 3     Sig: TAKE ONE TABLET BY MOUTH DAILY              Last Office Visit: 11/11/2022     Next Office Visit: 2/17/2023

## 2023-01-17 RX ORDER — FUROSEMIDE 20 MG/1
TABLET ORAL
Qty: 90 TABLET | Refills: 3 | Status: SHIPPED | OUTPATIENT
Start: 2023-01-17

## 2023-02-17 ENCOUNTER — OFFICE VISIT (OUTPATIENT)
Dept: CARDIOLOGY CLINIC | Age: 63
End: 2023-02-17
Payer: COMMERCIAL

## 2023-02-17 VITALS
DIASTOLIC BLOOD PRESSURE: 70 MMHG | HEART RATE: 76 BPM | BODY MASS INDEX: 24.75 KG/M2 | WEIGHT: 158 LBS | SYSTOLIC BLOOD PRESSURE: 120 MMHG

## 2023-02-17 DIAGNOSIS — I25.10 CAD IN NATIVE ARTERY: Primary | ICD-10-CM

## 2023-02-17 DIAGNOSIS — E78.5 HYPERLIPIDEMIA, UNSPECIFIED HYPERLIPIDEMIA TYPE: ICD-10-CM

## 2023-02-17 DIAGNOSIS — I48.0 PAROXYSMAL ATRIAL FIBRILLATION (HCC): ICD-10-CM

## 2023-02-17 DIAGNOSIS — I10 ESSENTIAL HYPERTENSION: ICD-10-CM

## 2023-02-17 DIAGNOSIS — I25.2 MI, OLD: ICD-10-CM

## 2023-02-17 DIAGNOSIS — Z95.1 HX OF CABG: ICD-10-CM

## 2023-02-17 DIAGNOSIS — R00.2 PALPITATION: ICD-10-CM

## 2023-02-17 PROCEDURE — 1036F TOBACCO NON-USER: CPT | Performed by: INTERNAL MEDICINE

## 2023-02-17 PROCEDURE — G8427 DOCREV CUR MEDS BY ELIG CLIN: HCPCS | Performed by: INTERNAL MEDICINE

## 2023-02-17 PROCEDURE — 3074F SYST BP LT 130 MM HG: CPT | Performed by: INTERNAL MEDICINE

## 2023-02-17 PROCEDURE — 3078F DIAST BP <80 MM HG: CPT | Performed by: INTERNAL MEDICINE

## 2023-02-17 PROCEDURE — 99214 OFFICE O/P EST MOD 30 MIN: CPT | Performed by: INTERNAL MEDICINE

## 2023-02-17 PROCEDURE — G8420 CALC BMI NORM PARAMETERS: HCPCS | Performed by: INTERNAL MEDICINE

## 2023-02-17 PROCEDURE — 3017F COLORECTAL CA SCREEN DOC REV: CPT | Performed by: INTERNAL MEDICINE

## 2023-02-17 PROCEDURE — G8484 FLU IMMUNIZE NO ADMIN: HCPCS | Performed by: INTERNAL MEDICINE

## 2023-02-17 ASSESSMENT — ENCOUNTER SYMPTOMS
COUGH: 0
SHORTNESS OF BREATH: 0
CHOKING: 0
CHEST TIGHTNESS: 0

## 2023-02-17 NOTE — PROGRESS NOTES
Subjective:      Patient ID: Jenae Wilkinson is a 60 y.o. female. Here for follow up CAD/CABG/MI/HTN/Lipids. Doing well. Rare racing heart. Rhythm better. Was out of meds when having tachy. . Uses 1 cup coffee per day. Exercising at home. Walking. BP good at home. No angina. Breathing good. Off smokes. No pnd. No orthopnea. Wt stable. No syncope.      Past Medical History:   Diagnosis Date    Arthritis     Hypertension     Postoperative atrial fibrillation ([facility] [address] 2020    S/P CABG x 3 2020    S/P left atrial appendage ligation 2020     Past Surgical History:   Procedure Laterality Date    CARDIAC CATHETERIZATION  2020    Dr. Sravan Swanson Left 2020    Dr. Sellers [place] -  x3 (LIMA-LAD w/ 5.2 cm endarterectomy of LAD, L SVG-OM2, SVG-distal RCA); 40mm Atriclip placed to NATALIE; pectoral block per anesthesia    TOTAL KNEE ARTHROPLASTY Right      Social History     Socioeconomic History    Marital status: Single     Spouse name: Not on file    Number of children: Not on file    Years of education: Not on file    Highest education level: Not on file   Occupational History    Not on file   Tobacco Use    Smoking status: Former     Packs/day: 0.50     Years: 20.00     Pack years: 10.00     Types: Cigarettes     Quit date: 2020     Years since quittin.4    Smokeless tobacco: Never   Vaping Use    Vaping Use: Never used   Substance and Sexual Activity    Alcohol use: No    Drug use: No    Sexual activity: Yes     Partners: Male   Other Topics Concern    Not on file   Social History Narrative    Not on file     Social Determinants of Health     Financial Resource Strain: Not on file   Food Insecurity: Not on file   Transportation Needs: Not on file   Physical Activity: Not on file   Stress: Not on file   Social Connections: Not on file   Intimate Partner Violence: Not on file   Housing Stability: Not on file      reviewed, Mo/Fa with MI    Vitals:    02/17/23 1125   BP: 120/70   Pulse: 76         Wt 158    Review of Systems   Constitutional:  Negative for activity change, appetite change and fatigue. Respiratory:  Negative for cough, choking, chest tightness and shortness of breath. Cardiovascular:  Negative for chest pain, palpitations and leg swelling. Denies PND or orthopnea. No tachycardia or syncope. Neurological:  Negative for dizziness, syncope and light-headedness. Psychiatric/Behavioral:  Negative for agitation, behavioral problems and confusion. All other systems reviewed and are negative. Objective:   Physical Exam  Constitutional:       General: She is not in acute distress. Appearance: Normal appearance. She is well-developed. HENT:      Head: Normocephalic and atraumatic. Right Ear: External ear normal.      Left Ear: External ear normal.   Neck:      Vascular: No JVD. Cardiovascular:      Rate and Rhythm: Normal rate and regular rhythm. Heart sounds: Normal heart sounds. No murmur heard. No gallop. Pulmonary:      Effort: Pulmonary effort is normal. No respiratory distress. Breath sounds: Normal breath sounds. No wheezing or rales. Abdominal:      General: Bowel sounds are normal.      Palpations: Abdomen is soft. Tenderness: There is no abdominal tenderness. Musculoskeletal:         General: Normal range of motion. Cervical back: Normal range of motion. Skin:     General: Skin is warm and dry. Neurological:      General: No focal deficit present. Mental Status: She is oriented to person, place, and time. Psychiatric:         Mood and Affect: Mood normal.         Behavior: Behavior normal.       Assessment:       Diagnosis Orders   1. CAD in native artery        2. Hx of CABG        3. Palpitation        4. MI, old        11. Essential hypertension        6. Hyperlipidemia, unspecified hyperlipidemia type        7.  Paroxysmal atrial fibrillation [facility])                Plan:      CV stable except rare episodes of racing heart but was out of meds. Doing well. Exercising some. Will continue ASA/lipitor/metoprolol. Will have echo. 30 day monitor showed some afib. Continue eliquis. Will have mariana, in view of Echo decline. Stop plavix. Reviewed previous records and testing including hosp for CABG/MI. Follow up 3 months .          Kerry Calvin MD

## 2023-05-12 NOTE — TELEPHONE ENCOUNTER
Requested Prescriptions     Pending Prescriptions Disp Refills    metoprolol tartrate (LOPRESSOR) 25 MG tablet 180 tablet 3     Sig: Take 1 tablet by mouth 2 times daily            Last Office Visit: 2/17/2023     Next Office Visit: 6/30/2023         Last Labs: 11.02.2022

## 2023-05-18 NOTE — TELEPHONE ENCOUNTER
Requested Prescriptions     Pending Prescriptions Disp Refills    ELIQUIS 5 MG TABS tablet [Pharmacy Med Name: ELIQUIS 5 MG TABLET] 180 tablet 3     Sig: TAKE ONE TABLET BY MOUTH TWICE A DAY              Last Office Visit: 2/17/2023     Next Office Visit: 6/30/2023

## 2023-05-19 RX ORDER — APIXABAN 5 MG/1
TABLET, FILM COATED ORAL
Qty: 180 TABLET | Refills: 3 | Status: SHIPPED | OUTPATIENT
Start: 2023-05-19

## 2023-09-18 RX ORDER — POTASSIUM CHLORIDE 750 MG/1
TABLET, EXTENDED RELEASE ORAL
Qty: 180 TABLET | Refills: 3 | Status: SHIPPED | OUTPATIENT
Start: 2023-09-18

## 2023-09-18 RX ORDER — ATORVASTATIN CALCIUM 80 MG/1
TABLET, FILM COATED ORAL
Qty: 90 TABLET | Refills: 3 | Status: SHIPPED | OUTPATIENT
Start: 2023-09-18

## 2023-09-18 NOTE — TELEPHONE ENCOUNTER
Requested Prescriptions     Pending Prescriptions Disp Refills    atorvastatin (LIPITOR) 80 MG tablet [Pharmacy Med Name: ATORVASTATIN 80 MG TABLET] 30 tablet      Sig: TAKE ONE TABLET BY MOUTH ONCE NIGHTLY            Last Office Visit: 2/17/2023     Next Office Visit: 10/25/2023

## 2023-09-18 NOTE — TELEPHONE ENCOUNTER
Requested Prescriptions     Pending Prescriptions Disp Refills    potassium chloride (KLOR-CON M) 10 MEQ extended release tablet [Pharmacy Med Name: POTASSIUM CHLORIDE ER M-10 MEQ TAB] 60 tablet      Sig: TAKE ONE TABLET BY MOUTH TWICE A DAY          Number: 60    Refills: 5    Last Office Visit: 2/17/2023     Next Office Visit: 9/17/2023

## 2024-01-15 NOTE — TELEPHONE ENCOUNTER
Requested Prescriptions     Pending Prescriptions Disp Refills    furosemide (LASIX) 20 MG tablet [Pharmacy Med Name: FUROSEMIDE 20 MG TABLET] 30 tablet 3     Sig: TAKE ONE TABLET BY MOUTH DAILY     Last Office Visit: 2/17/2023     Next Office Visit: 1/31/2024

## 2024-01-15 NOTE — TELEPHONE ENCOUNTER
Requested Prescriptions     Pending Prescriptions Disp Refills    furosemide (LASIX) 20 MG tablet 90 tablet 3     Sig: Take 1 tablet by mouth daily        Last Office Visit: 2/17/2023     Next Office Visit: 1/13/2024

## 2024-01-16 RX ORDER — FUROSEMIDE 20 MG/1
20 TABLET ORAL DAILY
Qty: 90 TABLET | Refills: 2 | Status: SHIPPED | OUTPATIENT
Start: 2024-01-16

## 2024-01-16 RX ORDER — FUROSEMIDE 20 MG/1
TABLET ORAL
Qty: 30 TABLET | Refills: 3 | Status: SHIPPED | OUTPATIENT
Start: 2024-01-16

## 2024-01-18 RX ORDER — FUROSEMIDE 20 MG/1
20 TABLET ORAL DAILY
Qty: 30 TABLET | Refills: 1 | OUTPATIENT
Start: 2024-01-18

## 2024-01-22 RX ORDER — FUROSEMIDE 20 MG/1
20 TABLET ORAL DAILY
Qty: 90 TABLET | Refills: 2 | OUTPATIENT
Start: 2024-01-22

## 2024-02-09 ENCOUNTER — PATIENT MESSAGE (OUTPATIENT)
Dept: CARDIOLOGY CLINIC | Age: 64
End: 2024-02-09

## 2024-02-14 NOTE — TELEPHONE ENCOUNTER
From: Nilam White  To: Dr. Jasper Ocasio  Sent: 2/9/2024 2:19 PM EST  Subject: Medications    What medications should I stop taking before a knee injection?

## 2024-02-15 ENCOUNTER — HOSPITAL ENCOUNTER (EMERGENCY)
Age: 64
Discharge: HOME | End: 2024-02-15
Attending: EMERGENCY MEDICINE
Payer: COMMERCIAL

## 2024-02-15 VITALS
DIASTOLIC BLOOD PRESSURE: 77 MMHG | HEART RATE: 81 BPM | BODY MASS INDEX: 26.16 KG/M2 | OXYGEN SATURATION: 100 % | WEIGHT: 167 LBS | TEMPERATURE: 98.1 F | RESPIRATION RATE: 18 BRPM | SYSTOLIC BLOOD PRESSURE: 182 MMHG

## 2024-02-15 DIAGNOSIS — G89.18 OTHER ACUTE POSTPROCEDURAL PAIN: Primary | ICD-10-CM

## 2024-02-15 PROCEDURE — 99282 EMERGENCY DEPT VISIT SF MDM: CPT

## 2024-02-16 NOTE — DISCHARGE INSTRUCTIONS
Discharge home  Ice to the affected area  Keep the leg elevated  Aleve over-the-counter  Follow-up with your pain management specialist

## 2024-02-16 NOTE — ED PROVIDER NOTES
[facility] EMERGENCY DEPARTMENT  eMERGENCY dEPARTMENT eNCOUnter      Pt Name: Nilam White  MRN: [id number]  Birthdate 1960  Date of evaluation: 2/15/2024  Provider: Saturnino Smart MD  PCP: No primary care provider on file.      CHIEF COMPLAINT       Chief Complaint   Patient presents with    Knee Pain       HISTORY OFPRESENT ILLNESS   (Location/Symptom, Timing/Onset, Context/Setting, Quality, Duration, Modifying Factors,Severity)  Note limiting factors.     Nilam White is a 63 y.o. female had a cortisone shot on her knee earlier today and has pain shooting up her leg feels like it is worse than it was before she got the shot she can ambulate she denies any fever    Nursing Notes were all reviewed and agreed with or any disagreements were addressed  in the HPI.    REVIEW OF SYSTEMS    (2-9 systems for level 4, 10 or more for level 5)     Review of Systems    Positives and Pertinent negatives as per HPI.  Except as noted above in the ROS, all other systems were reviewed andnegative.       PASTMEDICAL HISTORY     Past Medical History:   Diagnosis Date    Arthritis     Hypertension     Postoperative atrial fibrillation ([facility]) 09/2020    S/P CABG x 3 09/2020    S/P left atrial appendage ligation 09/2020         SURGICAL HISTORY       Past Surgical History:   Procedure Laterality Date    CARDIAC CATHETERIZATION  09/17/2020    Dr. Damian    CARPAL TUNNEL RELEASE Right 2011    CORONARY ARTERY BYPASS GRAFT Left 9/22/2020    Dr. Ceballos -  x3 (LIMA-LAD w/ 5.2 cm endarterectomy of LAD, L SVG-OM2, SVG-distal RCA); 40mm Atriclip placed to NATALIE; pectoral block per anesthesia    TOTAL KNEE ARTHROPLASTY Right 2009         CURRENT MEDICATIONS       Previous Medications    ASPIRIN 325 MG EC TABLET    Take 1 tablet by mouth daily    ATORVASTATIN (LIPITOR) 80 MG TABLET    TAKE ONE TABLET BY MOUTH ONCE NIGHTLY    CLOPIDOGREL (PLAVIX) 75 MG TABLET    TAKE ONE TABLET BY MOUTH DAILY    ELIQUIS 5 MG TABS

## 2024-03-28 ENCOUNTER — OFFICE VISIT (OUTPATIENT)
Dept: CARDIOLOGY CLINIC | Age: 64
End: 2024-03-28
Payer: COMMERCIAL

## 2024-03-28 VITALS
HEART RATE: 60 BPM | BODY MASS INDEX: 25.53 KG/M2 | DIASTOLIC BLOOD PRESSURE: 80 MMHG | SYSTOLIC BLOOD PRESSURE: 120 MMHG | WEIGHT: 163 LBS

## 2024-03-28 DIAGNOSIS — R00.2 PALPITATION: ICD-10-CM

## 2024-03-28 DIAGNOSIS — I25.2 MI, OLD: ICD-10-CM

## 2024-03-28 DIAGNOSIS — Z95.1 HX OF CABG: ICD-10-CM

## 2024-03-28 DIAGNOSIS — I10 ESSENTIAL HYPERTENSION: ICD-10-CM

## 2024-03-28 DIAGNOSIS — E78.5 HYPERLIPIDEMIA, UNSPECIFIED HYPERLIPIDEMIA TYPE: ICD-10-CM

## 2024-03-28 DIAGNOSIS — I25.10 CAD IN NATIVE ARTERY: Primary | ICD-10-CM

## 2024-03-28 DIAGNOSIS — I48.0 PAROXYSMAL ATRIAL FIBRILLATION (HCC): ICD-10-CM

## 2024-03-28 PROCEDURE — G8428 CUR MEDS NOT DOCUMENT: HCPCS | Performed by: INTERNAL MEDICINE

## 2024-03-28 PROCEDURE — 1036F TOBACCO NON-USER: CPT | Performed by: INTERNAL MEDICINE

## 2024-03-28 PROCEDURE — 3074F SYST BP LT 130 MM HG: CPT | Performed by: INTERNAL MEDICINE

## 2024-03-28 PROCEDURE — G8419 CALC BMI OUT NRM PARAM NOF/U: HCPCS | Performed by: INTERNAL MEDICINE

## 2024-03-28 PROCEDURE — 99214 OFFICE O/P EST MOD 30 MIN: CPT | Performed by: INTERNAL MEDICINE

## 2024-03-28 PROCEDURE — 3079F DIAST BP 80-89 MM HG: CPT | Performed by: INTERNAL MEDICINE

## 2024-03-28 PROCEDURE — G8484 FLU IMMUNIZE NO ADMIN: HCPCS | Performed by: INTERNAL MEDICINE

## 2024-03-28 PROCEDURE — 3017F COLORECTAL CA SCREEN DOC REV: CPT | Performed by: INTERNAL MEDICINE

## 2024-03-28 ASSESSMENT — ENCOUNTER SYMPTOMS
SHORTNESS OF BREATH: 0
CHEST TIGHTNESS: 0
CHOKING: 0
COUGH: 0

## 2024-03-28 NOTE — PROGRESS NOTES
Subjective:      Patient ID: Nilam White is a 63 y.o. female.    Here for follow up CAD/CABG/MI/HTN/Lipids.  Doing well.   Rhythm stable.  Uses 1 cup coffee per day.   Exercising at home.  Walking.  BP good at home.   No angina. Breathing good.  Off smokes.  No pnd.  No orthopnea.  Wt stable. No syncope.     Past Medical History:   Diagnosis Date    Arthritis     Hypertension     Postoperative atrial fibrillation ([facility]) 09/2020    S/P CABG x 3 09/2020    S/P left atrial appendage ligation 09/2020     Past Surgical History:   Procedure Laterality Date    CARDIAC CATHETERIZATION  09/17/2020    Dr. Damian    CARPAL TUNNEL RELEASE Right 2011    CORONARY ARTERY BYPASS GRAFT Left 9/22/2020    Dr. Ceballos -  x3 (LIMA-LAD w/ 5.2 cm endarterectomy of LAD, L SVG-OM2, SVG-distal RCA); 40mm Atriclip placed to NATALIE; pectoral block per anesthesia    TOTAL KNEE ARTHROPLASTY Right 2009     Social History     Socioeconomic History    Marital status: Single     Spouse name: Not on file    Number of children: Not on file    Years of education: Not on file    Highest education level: Not on file   Occupational History    Not on file   Tobacco Use    Smoking status: Former     Current packs/day: 0.00     Average packs/day: 0.5 packs/day for 20.0 years (10.0 ttl pk-yrs)     Types: Cigarettes     Start date: 09/2000     Quit date: 09/2020     Years since quitting: 3.5    Smokeless tobacco: Never   Vaping Use    Vaping Use: Never used   Substance and Sexual Activity    Alcohol use: No    Drug use: No    Sexual activity: Yes     Partners: Male   Other Topics Concern    Not on file   Social History Narrative    Not on file     Social Determinants of Health     Financial Resource Strain: Not on file   Food Insecurity: Not on file   Transportation Needs: Not on file   Physical Activity: Not on file   Stress: Not on file   Social Connections: Not on file   Intimate Partner Violence: Not on file   Housing Stability: Not on file

## 2024-06-19 NOTE — TELEPHONE ENCOUNTER
Atorvastatin 80 mg     Checked Correct Pharmacy: Yes    Any changes since last refill? No     Number: 90    Refills: 3    Last Office Visit: 3/28/2024     Next Office Visit: Visit date not found     Last Refill: 09/18/2023    Last Labs: 11/02/2022 Hepatic/Lipid

## 2024-06-21 RX ORDER — ATORVASTATIN CALCIUM 80 MG/1
80 TABLET, FILM COATED ORAL NIGHTLY
Qty: 90 TABLET | Refills: 3 | Status: SHIPPED | OUTPATIENT
Start: 2024-06-21

## 2024-08-06 ENCOUNTER — TELEPHONE (OUTPATIENT)
Dept: CARDIOLOGY CLINIC | Age: 64
End: 2024-08-06

## 2024-08-06 NOTE — TELEPHONE ENCOUNTER
Requested Prescriptions     Pending Prescriptions Disp Refills    atorvastatin (LIPITOR) 80 MG tablet 90 tablet 3     Sig: Take 1 tablet by mouth nightly            Checked Correct Pharmacy: Yes    Any changes since last refill? No     Number: 90    Refills: 3    Last Office Visit: 3/28/2024     Next Office Visit: Visit date not found

## 2024-08-07 RX ORDER — ATORVASTATIN CALCIUM 80 MG/1
80 TABLET, FILM COATED ORAL NIGHTLY
Qty: 90 TABLET | Refills: 3 | Status: SHIPPED | OUTPATIENT
Start: 2024-08-07

## 2024-08-15 ENCOUNTER — TELEPHONE (OUTPATIENT)
Dept: CARDIOLOGY CLINIC | Age: 64
End: 2024-08-15

## 2024-08-15 NOTE — TELEPHONE ENCOUNTER
LMOM Dr Ocasio was no longer with us and she needed to make a fu appt or let us know if she was going to another practice. She wanted her furosemide filled.

## 2024-09-25 ENCOUNTER — HOSPITAL ENCOUNTER (OUTPATIENT)
Dept: MAMMOGRAPHY | Age: 64
Discharge: HOME | End: 2024-09-25
Payer: MEDICARE

## 2024-09-25 VITALS — HEIGHT: 67 IN | BODY MASS INDEX: 25.43 KG/M2 | WEIGHT: 162 LBS

## 2024-09-25 DIAGNOSIS — Z12.31 VISIT FOR SCREENING MAMMOGRAM: ICD-10-CM

## 2024-09-25 PROCEDURE — 77063 BREAST TOMOSYNTHESIS BI: CPT

## 2025-01-10 NOTE — TELEPHONE ENCOUNTER
Requested Prescriptions     Pending Prescriptions Disp Refills    furosemide (LASIX) 20 MG tablet 30 tablet 0     Sig: Take 1 tablet by mouth daily            Checked Correct Pharmacy: Yes    Any changes since last refill? No     Number: 30  Refills: 0    Last OV: 3/28/2024 Provider: Dr. Ocasio    Next OV:  Left voicemail for patient to return call for follow up scheduling to establish care w/new provider.     Last Labs:   Lipid:   Lab Results   Component Value Date    CHOL 144 11/02/2022    TRIG 67 11/02/2022    HDL 45 11/02/2022    LDL 86 11/02/2022    VLDL 13 11/02/2022

## 2025-01-13 RX ORDER — FUROSEMIDE 20 MG/1
20 TABLET ORAL DAILY
Qty: 90 TABLET | Refills: 0 | Status: SHIPPED | OUTPATIENT
Start: 2025-01-13

## 2025-05-08 RX ORDER — METOPROLOL TARTRATE 25 MG/1
25 TABLET, FILM COATED ORAL 2 TIMES DAILY
Qty: 14 TABLET | Refills: 0 | Status: SHIPPED | OUTPATIENT
Start: 2025-05-08

## 2025-07-29 ENCOUNTER — OFFICE VISIT (OUTPATIENT)
Dept: ORTHOPEDIC SURGERY | Age: 65
End: 2025-07-29

## 2025-07-29 DIAGNOSIS — M25.562 LEFT KNEE PAIN, UNSPECIFIED CHRONICITY: ICD-10-CM

## 2025-07-29 DIAGNOSIS — M17.12 PRIMARY OSTEOARTHRITIS OF LEFT KNEE: Primary | ICD-10-CM

## 2025-07-29 DIAGNOSIS — S83.282A TEAR OF LATERAL MENISCUS OF LEFT KNEE, CURRENT, UNSPECIFIED TEAR TYPE, INITIAL ENCOUNTER: ICD-10-CM

## 2025-07-29 RX ORDER — LIDOCAINE HYDROCHLORIDE 10 MG/ML
5 INJECTION, SOLUTION INFILTRATION; PERINEURAL ONCE
Status: COMPLETED | OUTPATIENT
Start: 2025-07-29 | End: 2025-07-29

## 2025-07-29 RX ORDER — METHYLPREDNISOLONE ACETATE 80 MG/ML
80 INJECTION, SUSPENSION INTRA-ARTICULAR; INTRALESIONAL; INTRAMUSCULAR; SOFT TISSUE ONCE
Status: COMPLETED | OUTPATIENT
Start: 2025-07-29 | End: 2025-07-29

## 2025-07-29 RX ADMIN — METHYLPREDNISOLONE ACETATE 80 MG: 80 INJECTION, SUSPENSION INTRA-ARTICULAR; INTRALESIONAL; INTRAMUSCULAR; SOFT TISSUE at 10:54

## 2025-07-29 RX ADMIN — LIDOCAINE HYDROCHLORIDE 5 ML: 10 INJECTION, SOLUTION INFILTRATION; PERINEURAL at 10:53

## 2025-07-30 NOTE — PROGRESS NOTES
Date:  2025    Name:  Nilam White  Address:  [address] [place] [id number]    :  1960      Age:   65 y.o.        Chief Complaint    New Patient (Patient is here today for follow up on left knee pain. Pt had a fall with knee with twisting. Pt has had cortisone injections in the past.)      History of Present Illness:  Nilam White is a 65 y.o. female with a past medical history of a CABG x 3, A-fib on Eliquis and half pack a day smoker who presents for evaluation of her left knee pain.  Patient states that several days ago (Saturday) she was ambulating when she tripped and fell forward with her left tibia in an externally rotated position.  She felt sharp pain to the joint line and difficulty weightbearing afterwards.  She denies hearing or feeling a pop.  Since then she has been ambulating with an antalgic gait noting sharp pain through the lateral joint line that she rates 7/10 and describes as sharp.  She notes intermittent cessations of partial giving way.  However, she notes that her symptoms of pain and partial instability have all been improving.  Conservative treatment has included: Rest, ice, activity modification, over-the-counter pain medication and home exercises.         Pain Assessment  Location of Pain: Knee  Location Modifiers: Left  Severity of Pain: 7  Quality of Pain: Sharp  Duration of Pain: Persistent  Frequency of Pain: Constant    Past Medical History:   Diagnosis Date    Arthritis     Hypertension     Postoperative atrial fibrillation (HCC) 2020    S/P CABG x 3 2020    S/P left atrial appendage ligation 2020        Past Surgical History:   Procedure Laterality Date    CARDIAC CATHETERIZATION  2020    Dr. Damian    CARPAL TUNNEL RELEASE Right     CORONARY ARTERY BYPASS GRAFT Left 2020    Dr. Ceballos -  x3 (LIMA-LAD w/ 5.2 cm endarterectomy of LAD, L SVG-OM2, SVG-distal RCA); 40mm Atriclip placed to NATALIE; pectoral block per anesthesia

## 2025-09-03 ENCOUNTER — OFFICE VISIT (OUTPATIENT)
Dept: ORTHOPEDIC SURGERY | Age: 65
End: 2025-09-03
Payer: MEDICARE

## 2025-09-03 DIAGNOSIS — S83.282A ACUTE LATERAL MENISCUS TEAR OF LEFT KNEE, INITIAL ENCOUNTER: Primary | ICD-10-CM

## 2025-09-03 DIAGNOSIS — M17.12 PRIMARY OSTEOARTHRITIS OF LEFT KNEE: ICD-10-CM

## 2025-09-03 PROCEDURE — 99213 OFFICE O/P EST LOW 20 MIN: CPT | Performed by: PHYSICIAN ASSISTANT

## 2025-09-03 PROCEDURE — 1159F MED LIST DOCD IN RCRD: CPT | Performed by: PHYSICIAN ASSISTANT

## 2025-09-03 PROCEDURE — 1123F ACP DISCUSS/DSCN MKR DOCD: CPT | Performed by: PHYSICIAN ASSISTANT

## (undated) DEVICE — CONNECTOR PERF W0.25XH3/8IN BASE Y SHP REDUC W/O LUERLOCK

## (undated) DEVICE — SET PROC STD VOL BOWL SUCT ASMBLY GS FLTR BLD COLLCTN RESVR

## (undated) DEVICE — CLIP SM RED INTERN HMOCLP TITAN LIGATING

## (undated) DEVICE — TOWEL,OR,DSP,ST,WHITE,DLX,4/PK,20PK/CS: Brand: MEDLINE

## (undated) DEVICE — SOLUTION IV IRRIG 1000ML POUR BTL 2F7114

## (undated) DEVICE — JEWISH HOSPITAL TURNOVER KIT: Brand: MEDLINE INDUSTRIES, INC.

## (undated) DEVICE — SPONGE GZ W4XL4IN COT 12 PLY TYP VII WVN C FLD DSGN

## (undated) DEVICE — SUTURE ABSORBABLE BRAIDED 2-0 CT-1 27 IN UD VICRYL J259H

## (undated) DEVICE — Device: Brand: TEMPORARY MYOCARDIAL HEARTWIRE

## (undated) DEVICE — ROYAL SILK SURGICAL GOWN, XL: Brand: CONVERTORS

## (undated) DEVICE — OPEN HRT CDS

## (undated) DEVICE — CONNECTOR PERF 3/8X3/8X3/8IN EQL WYE

## (undated) DEVICE — Device

## (undated) DEVICE — INTENDED FOR TISSUE SEPARATION, AND OTHER PROCEDURES THAT REQUIRE A SHARP SURGICAL BLADE TO PUNCTURE OR CUT.: Brand: BARD-PARKER ® CARBON RIB-BACK BLADES

## (undated) DEVICE — COVER LT HNDL BLU PLAS

## (undated) DEVICE — COVER LT HNDL CAM BLU DISP W/ SURG CTRL

## (undated) DEVICE — CONNECTOR IV TB L28MM CLR VLV ACCS NDLLSS DISP MAXPLUS

## (undated) DEVICE — STOCKINETTE,DOUBLE PLY,6X48,STERILE: Brand: MEDLINE

## (undated) DEVICE — DUAL LUMEN STOMACH TUBE: Brand: SALEM SUMP

## (undated) DEVICE — AEGIS 1" DISK 4MM HOLE, PEEL OPEN: Brand: MEDLINE

## (undated) DEVICE — TUBING, SUCTION, 1/4" X 12', STRAIGHT: Brand: MEDLINE

## (undated) DEVICE — WAX SURG 2.5GM HEMSTAT BNE BEESWAX PARAFFIN ISO PALMITATE

## (undated) DEVICE — SOLUTION IV IRRIG POUR BRL 0.9% SODIUM CHL 2F7124

## (undated) DEVICE — CANNULA PERF 24FR 51CML 45DEG TIP 3 8IN CONN 20CML SUT RNG

## (undated) DEVICE — SOLUTION NORMOSOL-R PH 7.4   X

## (undated) DEVICE — [HIGH FLOW INSUFFLATOR,  DO NOT USE IF PACKAGE IS DAMAGED,  KEEP DRY,  KEEP AWAY FROM SUNLIGHT,  PROTECT FROM HEAT AND RADIOACTIVE SOURCES.]: Brand: PNEUMOSURE

## (undated) DEVICE — PROCEDURE KIT COMP 5Y10R8

## (undated) DEVICE — SUTURE NONABSORBABLE MFIL TAPR PNT 3/8 CIR BLU 8.0M BV175-6 8734H

## (undated) DEVICE — E-Z CLEAN, NON-STICK, PTFE COATED, ELECTROSURGICAL BLADE ELECTRODE, 2.5 INCH (6.35 CM): Brand: EZ CLEAN

## (undated) DEVICE — CATHETER ETER URIN 18FR ROB NELATON RED RUB ALL PURP

## (undated) DEVICE — 3M™ TEGADERM™ TRANSPARENT FILM DRESSING FRAME STYLE, 1626, 4 IN X 4-3/4 IN (10 CM X 12 CM), 50/CT 4CT/CASE: Brand: 3M™ TEGADERM™

## (undated) DEVICE — SUTURE PDS II SZ 0 L27IN ABSRB VLT L36MM CT-1 1/2 CIR Z340H

## (undated) DEVICE — SOLUTION IV CARDPLG PLEGISOL

## (undated) DEVICE — SUTURE PERMA-HAND SZ 2 L60IN NONABSORBABLE BLK SILK BRAID SA8H

## (undated) DEVICE — AIR SHEET,LAT,COMFORT GLIDE, BLEND 40X80: Brand: MEDLINE

## (undated) DEVICE — CONNECTOR PERF W3 8XH3 8XL3 8IN BASE EQL Y SHP W O LUERLOCK

## (undated) DEVICE — LARGE BORE STOPCOCK WITH ROTATING MALE LUER LOCK

## (undated) DEVICE — Z INACTIVE NO SUPPLIER SOLUTIONIRRIG 3000ML 0.9% SOD CHL FLX CONT [79720808] [HOSPIRA WORLDWIDE INC]

## (undated) DEVICE — SYSTEM SKIN CLSR 22CM DERMBND PRINEO

## (undated) DEVICE — ELECTROSURGICAL PENCIL ROCKER SWITCH NON COATED BLADE ELECTRODE 10 FT (3 M) CORD HOLSTER: Brand: MEGADYNE

## (undated) DEVICE — CONNECTOR PERF W3/8XH0.5IN BASE Y SHP REDUC W/O LUERLOCK

## (undated) DEVICE — DRAPE SLUSH W44XL66IN FOR RECT BSIN ORS HUSH SYS PLATE-DRP

## (undated) DEVICE — Z INACTIVE USE 2662641 SOLUTION IV 1000ML 140MEQ/L SOD 5MEQ/L K 3MEQ/L MG 27MEQ/L

## (undated) DEVICE — ADHESIVE SKIN CLSR 0.7ML TOP DERMBND ADV

## (undated) DEVICE — CANNULA PERF L15IN DIA29FR VEN 3 STG THN WALL DSGN W  VENT

## (undated) DEVICE — CANNULA PERF 7FR L5.5IN AORT ROOT RADPQ STD TIP W/ VENT LN

## (undated) DEVICE — SUTURE S STL SZ 6 L18IN NONABSORBABLE SIL L48MM V-40 1/2 M649G

## (undated) DEVICE — SOLUTION IV SODIUM CHL 0.9% 500 ML INJ FLX CONTAINER

## (undated) DEVICE — CANNULA VES L25IN RADPQ BODY W  1 W VLV 3MM BLNT TIP DLP

## (undated) DEVICE — BAG PRSS INFUS 500ML NYL NETTED BK VISIBLE COLOR-CODED G L

## (undated) DEVICE — SUTURE GOR TX SZ 2-0 L36IN NONABSORBABLE L18MM TH-18 1/2 3N04B

## (undated) DEVICE — BLOOD TRANSFUSION FILTER: Brand: HAEMONETICS

## (undated) DEVICE — ORTHO PRE OP PACK: Brand: MEDLINE INDUSTRIES, INC.

## (undated) DEVICE — Z INACTIVE USE 2582933 BAG BLD TRNSF 1 CPLR IV ST 600ML TERUFLEX

## (undated) DEVICE — FOGARTY - HYDRAGRIP SURGICAL - CLAMP INSERTS: Brand: FOGARTY SOFTJAW

## (undated) DEVICE — GUIDE SURG SELECTION FOR GILLINOV COSGROVE LAA EXCLUSION SYS

## (undated) DEVICE — CATHETERIZATION TRAY 16 FR 5 CC FOL STR TIP URIMTR BARDX IC

## (undated) DEVICE — PRESSURE TUBING: Brand: TRUWAVE

## (undated) DEVICE — SUTURE VCRL SZ 0 L18IN ABSRB VLT L36MM CT-1 1/2 CIR J740D

## (undated) DEVICE — DRAIN SURG SGL COLL PT TB FOR ATS BG OASIS

## (undated) DEVICE — PUNCH AORT DIA4MM LNG HNDL

## (undated) DEVICE — SUTURE ETHBND 4-0 L30IN NONABSORBABLE GRN L17MM RB-1 1/2 X551H

## (undated) DEVICE — 3M™ STERI-DRAPE™ INSTRUMENT POUCH 1018: Brand: STERI-DRAPE™

## (undated) DEVICE — DRESSING GERM DIA1IN CNTR H DIA7MM BLU CHG ANTIMIC PROTCT

## (undated) DEVICE — SUTURE PERMA-HAND 0 L18IN NONABSORBABLE BLK SILK BRAID W/O SA66G

## (undated) DEVICE — Z INACTIVE OBSOLETE PER MEDTRONIC ADAPTER Y TYP RECIRCULATING FEM LUER CLMP W  CLR CODE ARROWS

## (undated) DEVICE — SUTURE VCRL SZ 3-0 L27IN ABSRB UD L24MM FS-1 3/8 CIR REV J442H

## (undated) DEVICE — DECANTER BAG 9": Brand: MEDLINE INDUSTRIES, INC.

## (undated) DEVICE — 1.5L THIN WALL CAN: Brand: CRD

## (undated) DEVICE — CONNECTOR PERF L5 IN OD1 2 IN SAT HCT ST FEATURING B CARE 5

## (undated) DEVICE — SURE SET-DOUBLE BASIN-LF: Brand: MEDLINE INDUSTRIES, INC.

## (undated) DEVICE — STERNUM SAW BLADE, 9.4MM X 34MM X 1MM: Brand: MICROAIRE®

## (undated) DEVICE — LOOP,VESSEL,MAXI,BLUE,2/PK,STERILE: Brand: MEDLINE

## (undated) DEVICE — CANNULA PERF L125IN OD15FR POLYVI CHL VEN RG AUTO INFL SMOOT

## (undated) DEVICE — 3M™ TEGADERM™ TRANSPARENT FILM DRESSING FRAME STYLE, 1624W, 2-3/8 IN X 2-3/4 IN (6 CM X 7 CM), 100/CT 4CT/CASE: Brand: 3M™ TEGADERM™

## (undated) DEVICE — GLOVE SURG SZ 75 L12IN FNGR THK94MIL TRNSLUC YEL LTX

## (undated) DEVICE — TUBING SUCT L12FT DIA025IN UNIV W SCALLOPED FEM CONN

## (undated) DEVICE — CATHETER,URETHRAL,REDRUBBER,STERILE,8FR: Brand: MEDLINE

## (undated) DEVICE — SHEET,DRAPE,53X77,STERILE: Brand: MEDLINE

## (undated) DEVICE — SUTURE ETHBND EXCEL 2-0 L30IN NONABSORBABLE GRN L26MM SH MX563

## (undated) DEVICE — TOWEL,OR,DSP,ST,BLUE,DLX,8/PK,10PK/CS: Brand: MEDLINE

## (undated) DEVICE — Device: Brand: MEDEX

## (undated) DEVICE — COVER US PRB W15XL244CM ST SURGICAL/INTRAOPERATIVE W/

## (undated) DEVICE — SYSTEM VES HARV ENDOSCP VASOVIEW HEMOPRO

## (undated) DEVICE — SUTURE NONABSORBABLE MONOFILAMENT 5-0 C-1 1X24 IN PROLENE 8725H